# Patient Record
Sex: FEMALE | Race: WHITE | NOT HISPANIC OR LATINO | Employment: UNEMPLOYED | URBAN - METROPOLITAN AREA
[De-identification: names, ages, dates, MRNs, and addresses within clinical notes are randomized per-mention and may not be internally consistent; named-entity substitution may affect disease eponyms.]

---

## 2017-01-27 ENCOUNTER — HOSPITAL ENCOUNTER (EMERGENCY)
Facility: HOSPITAL | Age: 42
Discharge: PSYCHIATRIC OR SUBSTANCE USE FACILITY | End: 2017-01-28
Attending: EMERGENCY MEDICINE
Payer: COMMERCIAL

## 2017-01-27 DIAGNOSIS — R82.81 PYURIA: ICD-10-CM

## 2017-01-27 DIAGNOSIS — F31.81: Primary | ICD-10-CM

## 2017-01-27 PROCEDURE — 81025 URINE PREGNANCY TEST: CPT | Performed by: EMERGENCY MEDICINE

## 2017-01-28 VITALS
OXYGEN SATURATION: 100 % | HEART RATE: 89 BPM | TEMPERATURE: 98 F | DIASTOLIC BLOOD PRESSURE: 85 MMHG | SYSTOLIC BLOOD PRESSURE: 113 MMHG | BODY MASS INDEX: 39.32 KG/M2 | RESPIRATION RATE: 18 BRPM | WEIGHT: 215 LBS

## 2017-01-28 LAB
ALBUMIN SERPL BCP-MCNC: 4 G/DL
ALP SERPL-CCNC: 76 U/L
ALT SERPL W/O P-5'-P-CCNC: 12 U/L
AMPHET+METHAMPHET UR QL: NEGATIVE
ANION GAP SERPL CALC-SCNC: 9 MMOL/L
APAP SERPL-MCNC: <3 UG/ML
AST SERPL-CCNC: 12 U/L
B-HCG UR QL: NEGATIVE
BACTERIA #/AREA URNS AUTO: ABNORMAL /HPF
BARBITURATES UR QL SCN>200 NG/ML: NEGATIVE
BASOPHILS # BLD AUTO: 0.01 K/UL
BASOPHILS NFR BLD: 0.2 %
BENZODIAZ UR QL SCN>200 NG/ML: NEGATIVE
BILIRUB SERPL-MCNC: 0.7 MG/DL
BILIRUB UR QL STRIP: NEGATIVE
BUN SERPL-MCNC: 16 MG/DL
BZE UR QL SCN: NEGATIVE
CALCIUM SERPL-MCNC: 8.9 MG/DL
CANNABINOIDS UR QL SCN: NEGATIVE
CHLORIDE SERPL-SCNC: 105 MMOL/L
CLARITY UR REFRACT.AUTO: ABNORMAL
CO2 SERPL-SCNC: 26 MMOL/L
COLOR UR AUTO: YELLOW
CREAT SERPL-MCNC: 0.8 MG/DL
CREAT UR-MCNC: 257 MG/DL
CTP QC/QA: YES
DIFFERENTIAL METHOD: ABNORMAL
EOSINOPHIL # BLD AUTO: 0.1 K/UL
EOSINOPHIL NFR BLD: 1.4 %
ERYTHROCYTE [DISTWIDTH] IN BLOOD BY AUTOMATED COUNT: 13.7 %
EST. GFR  (AFRICAN AMERICAN): >60 ML/MIN/1.73 M^2
EST. GFR  (NON AFRICAN AMERICAN): >60 ML/MIN/1.73 M^2
ETHANOL SERPL-MCNC: <10 MG/DL
GLUCOSE SERPL-MCNC: 93 MG/DL
GLUCOSE UR QL STRIP: NEGATIVE
HCT VFR BLD AUTO: 40.7 %
HGB BLD-MCNC: 12.9 G/DL
HGB UR QL STRIP: ABNORMAL
HYALINE CASTS UR QL AUTO: 11 /LPF
KETONES UR QL STRIP: ABNORMAL
LEUKOCYTE ESTERASE UR QL STRIP: ABNORMAL
LYMPHOCYTES # BLD AUTO: 1.6 K/UL
LYMPHOCYTES NFR BLD: 32 %
MCH RBC QN AUTO: 27.5 PG
MCHC RBC AUTO-ENTMCNC: 31.7 %
MCV RBC AUTO: 87 FL
METHADONE UR QL SCN>300 NG/ML: NEGATIVE
MICROSCOPIC COMMENT: ABNORMAL
MONOCYTES # BLD AUTO: 0.5 K/UL
MONOCYTES NFR BLD: 8.9 %
NEUTROPHILS # BLD AUTO: 2.9 K/UL
NEUTROPHILS NFR BLD: 57.1 %
NITRITE UR QL STRIP: NEGATIVE
OPIATES UR QL SCN: NEGATIVE
PCP UR QL SCN>25 NG/ML: NEGATIVE
PH UR STRIP: 6 [PH] (ref 5–8)
PLATELET # BLD AUTO: 342 K/UL
PMV BLD AUTO: 9.5 FL
POTASSIUM SERPL-SCNC: 4 MMOL/L
PROT SERPL-MCNC: 7.3 G/DL
PROT UR QL STRIP: ABNORMAL
RBC # BLD AUTO: 4.69 M/UL
RBC #/AREA URNS AUTO: 7 /HPF (ref 0–4)
SODIUM SERPL-SCNC: 140 MMOL/L
SP GR UR STRIP: 1.03 (ref 1–1.03)
SQUAMOUS #/AREA URNS AUTO: 21 /HPF
TOXICOLOGY INFORMATION: NORMAL
TSH SERPL DL<=0.005 MIU/L-ACNC: 1.25 UIU/ML
URN SPEC COLLECT METH UR: ABNORMAL
UROBILINOGEN UR STRIP-ACNC: 2 EU/DL
WBC # BLD AUTO: 5.06 K/UL
WBC #/AREA URNS AUTO: 18 /HPF (ref 0–5)

## 2017-01-28 PROCEDURE — 87086 URINE CULTURE/COLONY COUNT: CPT

## 2017-01-28 PROCEDURE — 99285 EMERGENCY DEPT VISIT HI MDM: CPT | Mod: ,,, | Performed by: EMERGENCY MEDICINE

## 2017-01-28 PROCEDURE — 80053 COMPREHEN METABOLIC PANEL: CPT

## 2017-01-28 PROCEDURE — 80329 ANALGESICS NON-OPIOID 1 OR 2: CPT

## 2017-01-28 PROCEDURE — 84443 ASSAY THYROID STIM HORMONE: CPT

## 2017-01-28 PROCEDURE — 85025 COMPLETE CBC W/AUTO DIFF WBC: CPT

## 2017-01-28 PROCEDURE — 80320 DRUG SCREEN QUANTALCOHOLS: CPT

## 2017-01-28 PROCEDURE — 99285 EMERGENCY DEPT VISIT HI MDM: CPT | Mod: 25

## 2017-01-28 PROCEDURE — 82570 ASSAY OF URINE CREATININE: CPT

## 2017-01-28 PROCEDURE — 81001 URINALYSIS AUTO W/SCOPE: CPT

## 2017-01-28 RX ORDER — HALOPERIDOL 5 MG/ML
5 INJECTION INTRAMUSCULAR EVERY 4 HOURS PRN
Status: DISCONTINUED | OUTPATIENT
Start: 2017-01-28 | End: 2017-01-28

## 2017-01-28 RX ORDER — ZOLPIDEM TARTRATE 5 MG/1
10 TABLET ORAL NIGHTLY PRN
Status: DISCONTINUED | OUTPATIENT
Start: 2017-01-28 | End: 2017-01-28 | Stop reason: HOSPADM

## 2017-01-28 RX ORDER — ZIPRASIDONE HYDROCHLORIDE 20 MG/1
20 CAPSULE ORAL 2 TIMES DAILY
Status: DISCONTINUED | OUTPATIENT
Start: 2017-01-28 | End: 2017-01-28 | Stop reason: HOSPADM

## 2017-01-28 RX ORDER — ACETAMINOPHEN 325 MG/1
650 TABLET ORAL EVERY 6 HOURS PRN
Status: DISCONTINUED | OUTPATIENT
Start: 2017-01-28 | End: 2017-01-28 | Stop reason: HOSPADM

## 2017-01-28 RX ORDER — OLANZAPINE 10 MG/1
10 TABLET, ORALLY DISINTEGRATING ORAL EVERY 8 HOURS PRN
Status: DISCONTINUED | OUTPATIENT
Start: 2017-01-28 | End: 2017-01-28 | Stop reason: HOSPADM

## 2017-01-28 RX ORDER — MAG HYDROX/ALUMINUM HYD/SIMETH 200-200-20
30 SUSPENSION, ORAL (FINAL DOSE FORM) ORAL EVERY 6 HOURS PRN
Status: DISCONTINUED | OUTPATIENT
Start: 2017-01-28 | End: 2017-01-28 | Stop reason: HOSPADM

## 2017-01-28 RX ORDER — LORAZEPAM 2 MG/ML
2 INJECTION INTRAMUSCULAR EVERY 4 HOURS PRN
Status: DISCONTINUED | OUTPATIENT
Start: 2017-01-28 | End: 2017-01-28

## 2017-01-28 RX ORDER — SULFAMETHOXAZOLE AND TRIMETHOPRIM 800; 160 MG/1; MG/1
1 TABLET ORAL 2 TIMES DAILY
Status: DISCONTINUED | OUTPATIENT
Start: 2017-01-28 | End: 2017-01-28 | Stop reason: HOSPADM

## 2017-01-28 RX ORDER — DIPHENHYDRAMINE HYDROCHLORIDE 50 MG/ML
50 INJECTION INTRAMUSCULAR; INTRAVENOUS EVERY 4 HOURS PRN
Status: DISCONTINUED | OUTPATIENT
Start: 2017-01-28 | End: 2017-01-28

## 2017-01-28 NOTE — ED TRIAGE NOTES
"Reports she does not know why she is here, that her  brings her to the hospital all the time because "he says I'm crazy". Denies SI/HI, hallucinations, delusions, psychiatric diagnoses. Pt does say she has a 6 month old baby and had some post partum depression, says she does not currently take any medication and is no longer depressed. Pt is suspicious of staff, defiant with requests without extensive explanation. No other symptoms reported or noted.   "
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
## 2017-01-28 NOTE — CONSULTS
See consult note written by me yesterday at 11:00 pm.    Toni Qiu MD  [employer] PGY2  1/28/2017  Pager: [phone]

## 2017-01-28 NOTE — ED NOTES
Patient has arrived to unit with RN and Two Security Guards. Pt is calm and cooperative. VSS. Patient was given phone to talk to family. Will continue to monitor

## 2017-01-28 NOTE — ED NOTES
Requested transport of patient via [facility] to transport pt to [facility] to [facility]. Awaiting Patient to be transported from ED to [facility]
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
## 2017-01-28 NOTE — ED NOTES
PEC packet faxed to [facility], [facility], [facility], [place], [facility].

## 2017-01-28 NOTE — ED NOTES
Patient accepted to [facility] ([place])  Accepting physician Dr. Kennedy  May call report to [phone]

## 2017-01-28 NOTE — ED NOTES
Appearance: Pt awake, alert & oriented to person, place & time. Pt in no acute distress at present time.   Skin: Skin warm, dry & intact. Mucous membranes moist. Skin turgor normal.   Respiratory: Respirations even, non-labored.   Neurologic: Pt moving all extremities without difficulty. Sensation intact.   Peripheral Vascular: All peripheral pulses present.

## 2017-01-28 NOTE — ED NOTES
Secure Patient Delivery has arrived. Patient Documentation, PEC, and Patient Belongings given. Patient called family members to contact about transfer to [facility]. Patient is calm and cooperative. Two Security Guards are present and RN to transport pt to vehicle.

## 2017-01-29 LAB
BACTERIA UR CULT: NORMAL
BACTERIA UR CULT: NORMAL

## 2017-02-06 ENCOUNTER — OFFICE VISIT (OUTPATIENT)
Dept: PSYCHIATRY | Facility: CLINIC | Age: 42
End: 2017-02-06
Payer: COMMERCIAL

## 2017-02-06 DIAGNOSIS — F31.9 BIPOLAR I DISORDER, MOST RECENT EPISODE (OR CURRENT) UNSPECIFIED: Primary | ICD-10-CM

## 2017-02-06 PROCEDURE — 90791 PSYCH DIAGNOSTIC EVALUATION: CPT | Mod: S$GLB,,, | Performed by: SOCIAL WORKER
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
## 2017-02-12 PROBLEM — F31.9 BIPOLAR I DISORDER, MOST RECENT EPISODE (OR CURRENT) UNSPECIFIED: Status: ACTIVE | Noted: 2017-02-12

## 2017-03-23 ENCOUNTER — OFFICE VISIT (OUTPATIENT)
Dept: PSYCHIATRY | Facility: CLINIC | Age: 42
End: 2017-03-23
Payer: COMMERCIAL

## 2017-03-23 VITALS
BODY MASS INDEX: 38.04 KG/M2 | WEIGHT: 228.31 LBS | HEART RATE: 97 BPM | HEIGHT: 65 IN | SYSTOLIC BLOOD PRESSURE: 122 MMHG | DIASTOLIC BLOOD PRESSURE: 70 MMHG

## 2017-03-23 DIAGNOSIS — F31.73 BIPOLAR I DISORDER, CURRENT OR MOST RECENT EPISODE MANIC, IN PARTIAL REMISSION: Primary | ICD-10-CM

## 2017-03-23 PROCEDURE — 1160F RVW MEDS BY RX/DR IN RCRD: CPT | Mod: S$GLB,,, | Performed by: PSYCHIATRY & NEUROLOGY

## 2017-03-23 PROCEDURE — 99999 PR PBB SHADOW E&M-EST. PATIENT-LVL III: CPT | Mod: PBBFAC,,, | Performed by: PSYCHIATRY & NEUROLOGY

## 2017-03-23 PROCEDURE — 99214 OFFICE O/P EST MOD 30 MIN: CPT | Mod: S$GLB,,, | Performed by: PSYCHIATRY & NEUROLOGY

## 2017-03-23 RX ORDER — IBUPROFEN 200 MG
200 TABLET ORAL EVERY 6 HOURS PRN
COMMUNITY
End: 2019-09-06 | Stop reason: ALTCHOICE

## 2017-03-23 RX ORDER — LAMOTRIGINE 25(42)-100
KIT ORAL
Qty: 1 EACH | Refills: 0 | Status: SHIPPED | OUTPATIENT
Start: 2017-03-23 | End: 2017-03-23

## 2017-03-23 RX ORDER — LITHIUM CARBONATE 300 MG
300 TABLET ORAL EVERY MORNING
Refills: 0 | COMMUNITY
Start: 2017-02-04 | End: 2017-03-23

## 2017-03-23 RX ORDER — TRAZODONE HYDROCHLORIDE 100 MG/1
100 TABLET ORAL NIGHTLY
Refills: 0 | COMMUNITY
Start: 2017-02-04 | End: 2017-03-23

## 2017-03-23 RX ORDER — LAMOTRIGINE 25 MG/1
TABLET ORAL
Qty: 42 TABLET | Refills: 0 | Status: SHIPPED | OUTPATIENT
Start: 2017-03-23 | End: 2017-04-10 | Stop reason: SDUPTHER

## 2017-03-23 RX ORDER — TRAZODONE HYDROCHLORIDE 50 MG/1
50-100 TABLET ORAL NIGHTLY
Qty: 60 TABLET | Refills: 1 | Status: SHIPPED | OUTPATIENT
Start: 2017-03-23 | End: 2017-04-10 | Stop reason: SDUPTHER

## 2017-03-23 RX ORDER — OLANZAPINE 10 MG/1
10 TABLET ORAL NIGHTLY
Qty: 30 TABLET | Refills: 0 | Status: SHIPPED | OUTPATIENT
Start: 2017-03-23 | End: 2017-07-19

## 2017-03-23 RX ORDER — CETIRIZINE HYDROCHLORIDE 10 MG/1
10 TABLET ORAL DAILY
COMMUNITY
End: 2019-11-07
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
## 2017-03-23 NOTE — MR AVS SNAPSHOT
[place] - Psychiatry [address] [facility] [address], [address]  [place] [address]  Phone: [phone]  Fax: [phone]                  Zonia Skaggs   3/23/2017 10:30 AM   Office Visit    Description:  Female : 1975   Provider:  Horace Whitehead MD   Department:  [facility]           Reason for Visit     New Patient                To Do List           Goals (5 Years of Data)     None      Follow-Up and Disposition     Return in about 6 weeks (around 2017), or if symptoms worsen or fail to improve.       These Medications        Disp Refills Start End    lamotrigine 25 mg (42) -100 mg (7) DsPk 1 each 0 3/23/2017     Take according to the dose pack instructions to taper upwards.    Pharmacy: [facility]/pharmacy #[id number] - [place] - [address] Ph #: [phone]       olanzapine (ZYPREXA) 10 MG tablet 30 tablet 0 3/23/2017     Take 1 tablet (10 mg total) by mouth every evening. - Oral    Pharmacy: [facility]/pharmacy #[id number] - [place] - [address] Ph #: [phone]       trazodone (DESYREL) 50 MG tablet 60 tablet 1 3/23/2017     Take 1-2 tablets ( mg total) by mouth every evening. - Oral    Pharmacy: [facility]/pharmacy #[id number] - [place] - [address] Ph #: [phone]         [facility]     [facility] -  Assistance  Registered nurses in the [facility] provide clinical advisement, health education, appointment booking, and other advisory services.  Call for this free service at [phone].             Medications           Message regarding Medications     Verify the changes and/or additions to your medication regime listed below are the same as discussed with your clinician today.  If any of these changes or additions are incorrect, please notify your healthcare provider.        START taking these NEW medications        Refills    lamotrigine 25 mg (42) -100 mg (7) DsPk 0    Sig: Take according to the dose pack instructions to taper  "upwards.    Class: Normal    olanzapine (ZYPREXA) 10 MG tablet 0    Sig: Take 1 tablet (10 mg total) by mouth every evening.    Class: Normal    Route: Oral    trazodone (DESYREL) 50 MG tablet 1    Sig: Take 1-2 tablets ( mg total) by mouth every evening.    Class: Normal    Route: Oral      STOP taking these medications     divalproex (DEPAKOTE) 500 MG Tb24 Take 2 tablets (1,000 mg total) by mouth every evening.    ziprasidone (GEODON) 80 MG capsule Take 1 capsule (80 mg total) by mouth before dinner.           Verify that the below list of medications is an accurate representation of the medications you are currently taking.  If none reported, the list may be blank. If incorrect, please contact your healthcare provider. Carry this list with you in case of emergency.           Current Medications     cetirizine (ZYRTEC) 10 MG tablet Take 10 mg by mouth once daily.    ibuprofen (ADVIL,MOTRIN) 200 MG tablet Take 200 mg by mouth every 6 (six) hours as needed for Pain.    lithium (LITHOTAB) 300 mg tablet Take 300 mg by mouth every morning.    omeprazole (PRILOSEC OTC) 20 MG tablet Take 1 tablet by mouth Daily.    lamotrigine 25 mg (42) -100 mg (7) DsPk Take according to the dose pack instructions to taper upwards.    olanzapine (ZYPREXA) 10 MG tablet Take 1 tablet (10 mg total) by mouth every evening.    trazodone (DESYREL) 50 MG tablet Take 1-2 tablets ( mg total) by mouth every evening.           Clinical Reference Information           Your Vitals Were     BP Pulse Height Weight Last Period BMI    122/70 (BP Location: Right arm, Patient Position: Sitting, BP Method: Manual) 97 5' 5" (1.651 m) 103.5 kg (228 lb 4.6 oz) 01/31/2017 (Approximate) 37.99 kg/m2      Blood Pressure          Most Recent Value    BP  122/70      Allergies as of 3/23/2017     Penicillins      Immunizations Administered on Date of Encounter - 3/23/2017     None      Instructions              [facility] - DEPARTMENT OF " PSYCHIATRY   NEW PATIENT ORIENTATION INFORMATION  OUTPATIENT SERVICES COUNSELING CONTRACT    We appreciate the opportunity to participate in your medical care and hope the following protocols will make it easier for you to receive quality treatment in our department.    1. PUNCTUALITY: Your appointment is scheduled for a fixed amount of time reserved especially for you.  To get the benefit of your appointment, please arrive early enough to allow time for parking and registration.  If you are late for your appointment, your clinician is not able to offer additional time.  Please make every effort to be on time.      2. PAYMENT FOR SERVICES:   Payments are expected at the time of service.  Please contact Patrica Astudillo at [phone] if you need to resolve issues involving your account at [facility] or to set up a payment plan.    3. CANCELLATION / MISSED APPOINTMENTS:   In order to receive quality care, all appointments must be kept.  Appointment may be cancelled, ONLY by talking with an  at phone number [phone], between 8:00 a.m. and 5:00 p.m., Monday through Friday, at least 24 hours before your appointment time.  Your clinician reserves this time specifically for you, and if you will be unable to use it, it is necessary that you cancel in a timely manner.  If you do not give at least 24-hour notice of cancellation a full fee will be assessed.  Please note that insurance does not cover no-show charges, so you will be billed directly.  If you are consistently late, cancel, or do not show for your appointments, our department reserves the right to terminate treatment    4. CALLING THE DEPARTMENT:   MESSAGES, SCHEDULE OR CANCEL APPOINTMENTS- In general you can reach the department by calling [phone], between 8:00 a.m. and 5:00 p.m., Monday through Friday, to schedule or cancel appointments or leave a message for your clinician.  It is advisable to schedule your visits far in advance to obtain  the most convenient times for your appointments.   AFTER HOURS, WEEKEND OR HOLIDAYS- For urgent questions after hours, weekends and holidays, calling the department number [phone] will connect you to the nursing staff on the Psychiatry Inpatient Unit.  The nursing staff will speak with you and contact the On Call psychiatrist if necessary.   EMERGENCY-  In case of a crisis when there is a concern of harm to self or others, call 911 or the office [phone] between 8:00 a.m. and 5:00 p.m., Monday through Friday.  After hours, weekends or holidays, please call 911 or go to the Emergency Department where you can be thoroughly evaluated by the On Call psychiatrist.  If possible, contact the psychiatrist on call at [phone] prior to leaving for the [facility].    5. TEAM APPROACH:  Most patients receive therapy through our team system.  In the team system, your primary therapist will be a , psychologist, psychiatry resident or psychiatrist.  If your therapist is a , psychologist or psychiatry resident, please contact your primary therapist first in matters other than medications or acute medical problems.    6. PRESCRIPTION REFILLS:  Prescription refills must be done at your physician office visit.  You will be given a sufficient number of refills to last one extra month beyond your next appointment.  No additional refills will be approved beyond the original treatment plan.  After hours, sufficient medication may be approved to last until the next scheduled appointment. After hours requests for refills on controlled substances may be declined by the psychiatrist on call, as he or she may not be familiar with your case  Please work closely with your doctor so that you have sufficient medication until your next appointment.  Again, please note that no additional prescriptions will be approved per patient request over the phone.   No additional refills will be  approved beyond the original treatment plan.   In certain exceptional situations, a phone consult appointment may be arranged, with appropriate charge, for the review and approval of prescription refills to last until the next scheduled appointment.    7. FOLLOW UP APPOINTMENTS:  Follow-up appointments can be made in person at the Psychiatry Appointment Desk, [place] [address], or by calling [phone], from 8am to 5pm, between Monday and Friday.  It is advisable to schedule your office visits far enough in advance to obtain the most convenient times for your appointments.    8. PAYMENT FOR SERVICES:   Payments are expected at the time of service.  Please contact Patrica Astudillo at [phone] if you need to resolve issues involving your account at [facility] or to set up a payment plan.    Revised Feb. 23, 2010 - F/OA1/Newpatient                            You have been provided with a certain amount of medication with a specified number of refills.  Please follow up within an adequate time before you run out of medications.  If you run out of medication before your next appointment you may be charged a refill fee.  REFILLS FOR CONTROLLED SUBSTANCES WILL NOT BE GIVEN WITHOUT AN APPOINTMENT.  I will not honor or fill automated refill requests from pharmacies.    Please book your next appointment today by phone: [phone].  Call 8am and 10:30am to speak with my assistant.    PLEASE BE AT LEAST 15 MINUTES EARLY FOR YOUR NEXT APPOINTMENT.  PLEASE, DO NOT BE LATE OR YOU WILL BE TURNED AWAY AND ASKED TO RESCHEDULE.  YOU MUST ALLOW TIME FOR CHECK-IN AS WELL AS GET YOUR VITAL SIGNS AND GO OVER YOUR MEDICATIONS.  Tardiness can affect and is not fair to the patients who present after you and are on time for their appointments.  It causes a delay in the appointments for patients and staff.  IF YOU ARE LATE FOR YOUR APPOINTMENT TIME, YOU WILL BE SEEN FOR A SHORTENED AMOUNT OF TIME OR ASKED TO RESCHEDULE.  THERE IS A  "POSSIBILITY THAT YOU WILL BE CHARGED FOR THE APPOINTMENT TIME PERSONALLY AND IT WILL NOT GO TO YOUR INSURANCE.  YOU MAY ALSO BE DISCHARGED FROM CLINIC with multiple "No Show" appointments.     -----------------------------------------------------------------------------------------------------------------  IF YOU FEEL SUICIDAL OR HAVING THOUGHTS OR PLANS TO HURT YOURSELF OR OTHERS, CALL 911 OR REPORT TO THE NEAREST EMERGENCY ROOM.  YOU CAN ALSO ACCESS ONE OF THE FOLLOWING HOTLINES:    [facility] Mobile Crisis  [phone]    [facility] Crisis Line   [phone]     [facility]  24 hours / 7 days   [phone]            Language Assistance Services     ATTENTION: Language assistance services are available, free of charge. Please call [phone].      ATENCIÓN: Si dorothyla spring, tiene a ford disposición servicios gratuitos de asistencia lingüística. Llame al [phone].     RONDA Ý: N?u b?n nói Ti?ng Vi?t, có các d?ch v? h? tr? ngôn ng? mi?n phí dành cho b?n. G?i s? [phone].         [facility] - Psychiatry complies with applicable Federal civil rights laws and does not discriminate on the basis of race, color, national origin, age, disability, or sex.        "

## 2017-03-23 NOTE — PATIENT INSTRUCTIONS
[facility] - DEPARTMENT OF PSYCHIATRY   NEW PATIENT ORIENTATION INFORMATION  OUTPATIENT SERVICES COUNSELING CONTRACT    We appreciate the opportunity to participate in your medical care and hope the following protocols will make it easier for you to receive quality treatment in our department.    1. PUNCTUALITY: Your appointment is scheduled for a fixed amount of time reserved especially for you.  To get the benefit of your appointment, please arrive early enough to allow time for parking and registration.  If you are late for your appointment, your clinician is not able to offer additional time.  Please make every effort to be on time.      2. PAYMENT FOR SERVICES:   Payments are expected at the time of service.  Please contact Patrica Astudillo at [phone] if you need to resolve issues involving your account at [facility] or to set up a payment plan.    3. CANCELLATION / MISSED APPOINTMENTS:   In order to receive quality care, all appointments must be kept.  Appointment may be cancelled, ONLY by talking with an  at phone number [phone], between 8:00 a.m. and 5:00 p.m., Monday through Friday, at least 24 hours before your appointment time.  Your clinician reserves this time specifically for you, and if you will be unable to use it, it is necessary that you cancel in a timely manner.  If you do not give at least 24-hour notice of cancellation a full fee will be assessed.  Please note that insurance does not cover no-show charges, so you will be billed directly.  If you are consistently late, cancel, or do not show for your appointments, our department reserves the right to terminate treatment    4. CALLING THE DEPARTMENT:   MESSAGES, SCHEDULE OR CANCEL APPOINTMENTS- In general you can reach the department by calling [phone], between 8:00 a.m. and 5:00 p.m., Monday through Friday, to schedule or cancel appointments or leave a message for your clinician.  It is advisable  to schedule your visits far in advance to obtain the most convenient times for your appointments.   AFTER HOURS, WEEKEND OR HOLIDAYS- For urgent questions after hours, weekends and holidays, calling the department number [phone] will connect you to the nursing staff on the Psychiatry Inpatient Unit.  The nursing staff will speak with you and contact the On Call psychiatrist if necessary.   EMERGENCY-  In case of a crisis when there is a concern of harm to self or others, call 911 or the office [phone] between 8:00 a.m. and 5:00 p.m., Monday through Friday.  After hours, weekends or holidays, please call 911 or go to the Emergency Department where you can be thoroughly evaluated by the On Call psychiatrist.  If possible, contact the psychiatrist on call at [phone] prior to leaving for the [facility].    5. TEAM APPROACH:  Most patients receive therapy through our team system.  In the team system, your primary therapist will be a , psychologist, psychiatry resident or psychiatrist.  If your therapist is a , psychologist or psychiatry resident, please contact your primary therapist first in matters other than medications or acute medical problems.    6. PRESCRIPTION REFILLS:  Prescription refills must be done at your physician office visit.  You will be given a sufficient number of refills to last one extra month beyond your next appointment.  No additional refills will be approved beyond the original treatment plan.  After hours, sufficient medication may be approved to last until the next scheduled appointment. After hours requests for refills on controlled substances may be declined by the psychiatrist on call, as he or she may not be familiar with your case  Please work closely with your doctor so that you have sufficient medication until your next appointment.  Again, please note that no additional prescriptions will be approved per patient  request over the phone.   No additional refills will be approved beyond the original treatment plan.   In certain exceptional situations, a phone consult appointment may be arranged, with appropriate charge, for the review and approval of prescription refills to last until the next scheduled appointment.    7. FOLLOW UP APPOINTMENTS:  Follow-up appointments can be made in person at the Psychiatry Appointment Desk, [place] [address], or by calling [phone], from 8am to 5pm, between Monday and Friday.  It is advisable to schedule your office visits far enough in advance to obtain the most convenient times for your appointments.    8. PAYMENT FOR SERVICES:   Payments are expected at the time of service.  Please contact Patrica Astudillo at [phone] if you need to resolve issues involving your account at [facility] or to set up a payment plan.    Revised Feb. 23, 2010 - F/OA1/Newpatient                            You have been provided with a certain amount of medication with a specified number of refills.  Please follow up within an adequate time before you run out of medications.  If you run out of medication before your next appointment you may be charged a refill fee.  REFILLS FOR CONTROLLED SUBSTANCES WILL NOT BE GIVEN WITHOUT AN APPOINTMENT.  I will not honor or fill automated refill requests from pharmacies.    Please book your next appointment today by phone: [phone].  Call 8am and 10:30am to speak with my assistant.    PLEASE BE AT LEAST 15 MINUTES EARLY FOR YOUR NEXT APPOINTMENT.  PLEASE, DO NOT BE LATE OR YOU WILL BE TURNED AWAY AND ASKED TO RESCHEDULE.  YOU MUST ALLOW TIME FOR CHECK-IN AS WELL AS GET YOUR VITAL SIGNS AND GO OVER YOUR MEDICATIONS.  Tardiness can affect and is not fair to the patients who present after you and are on time for their appointments.  It causes a delay in the appointments for patients and staff.  IF YOU ARE LATE FOR YOUR APPOINTMENT TIME, YOU WILL BE SEEN FOR A SHORTENED  "AMOUNT OF TIME OR ASKED TO RESCHEDULE.  THERE IS A POSSIBILITY THAT YOU WILL BE CHARGED FOR THE APPOINTMENT TIME PERSONALLY AND IT WILL NOT GO TO YOUR INSURANCE.  YOU MAY ALSO BE DISCHARGED FROM CLINIC with multiple "No Show" appointments.     -----------------------------------------------------------------------------------------------------------------  IF YOU FEEL SUICIDAL OR HAVING THOUGHTS OR PLANS TO HURT YOURSELF OR OTHERS, CALL 911 OR REPORT TO THE NEAREST EMERGENCY ROOM.  YOU CAN ALSO ACCESS ONE OF THE FOLLOWING HOTLINES:    [facility]  [phone]    [facility] Crisis Line   [phone]     [place]  24 hours / 7 days   [phone]       "

## 2017-04-10 RX ORDER — TRAZODONE HYDROCHLORIDE 50 MG/1
50-100 TABLET ORAL NIGHTLY
Qty: 60 TABLET | Refills: 0 | Status: SHIPPED | OUTPATIENT
Start: 2017-04-10 | End: 2018-01-31

## 2017-04-10 RX ORDER — LAMOTRIGINE 100 MG/1
100 TABLET ORAL DAILY
Qty: 30 TABLET | Refills: 0 | Status: SHIPPED | OUTPATIENT
Start: 2017-04-10 | End: 2017-04-26 | Stop reason: SDUPTHER

## 2017-04-26 ENCOUNTER — OFFICE VISIT (OUTPATIENT)
Dept: PSYCHIATRY | Facility: CLINIC | Age: 42
End: 2017-04-26
Payer: COMMERCIAL

## 2017-04-26 VITALS
BODY MASS INDEX: 36.93 KG/M2 | WEIGHT: 229.81 LBS | HEIGHT: 66 IN | HEART RATE: 93 BPM | SYSTOLIC BLOOD PRESSURE: 122 MMHG | DIASTOLIC BLOOD PRESSURE: 70 MMHG

## 2017-04-26 DIAGNOSIS — F31.73 BIPOLAR I DISORDER, CURRENT OR MOST RECENT EPISODE MANIC, IN PARTIAL REMISSION: Primary | ICD-10-CM

## 2017-04-26 PROCEDURE — 99999 PR PBB SHADOW E&M-EST. PATIENT-LVL III: CPT | Mod: PBBFAC,,, | Performed by: PSYCHIATRY & NEUROLOGY

## 2017-04-26 PROCEDURE — 1160F RVW MEDS BY RX/DR IN RCRD: CPT | Mod: S$GLB,,, | Performed by: PSYCHIATRY & NEUROLOGY

## 2017-04-26 PROCEDURE — 99214 OFFICE O/P EST MOD 30 MIN: CPT | Mod: S$GLB,,, | Performed by: PSYCHIATRY & NEUROLOGY

## 2017-04-26 RX ORDER — BUPROPION HYDROCHLORIDE 100 MG/1
100 TABLET, EXTENDED RELEASE ORAL 2 TIMES DAILY
Qty: 60 TABLET | Refills: 1 | Status: SHIPPED | OUTPATIENT
Start: 2017-04-26 | End: 2017-07-19 | Stop reason: SINTOL

## 2017-04-26 RX ORDER — LAMOTRIGINE 200 MG/1
200 TABLET ORAL DAILY
Qty: 30 TABLET | Refills: 1 | Status: SHIPPED | OUTPATIENT
Start: 2017-04-26 | End: 2017-07-05 | Stop reason: SDUPTHER

## 2017-04-26 NOTE — PATIENT INSTRUCTIONS
"        You have been provided with a certain amount of medication with a specified number of refills.  Please follow up within an adequate time before you run out of medications.    REFILLS FOR CONTROLLED SUBSTANCES WILL NOT BE GIVEN WITHOUT AN APPOINTMENT.  I will not honor or fill automated refill requests from pharmacies.  You must come in for an appointment to get refills.    Please book your next appointment for myself or therapist by phone: [phone].        PLEASE BE AT LEAST 15 MINUTES EARLY FOR YOUR NEXT APPOINTMENT.  PLEASE, DO NOT BE LATE OR YOU WILL BE TURNED AWAY AND ASKED TO RESCHEDULE.  YOU MUST ALLOW TIME FOR CHECK-IN AS WELL AS GET YOUR VITAL SIGNS AND GO OVER YOUR MEDICATIONS.  Tardiness can affect and is not fair to the patients who present after you and are on time for their appointments.  It causes a delay in the appointments for patients and staff.  THERE IS A POSSIBILITY THAT YOU WILL BE CHARGED FOR THE APPOINTMENT TIME PERSONALLY AND IT WILL NOT GO TO YOUR INSURANCE.  YOU MAY ALSO BE DISCHARGED FROM CLINIC with multiple "No Show" appointments.       -----------------------------------------------------------------------------------------------------------------  IF YOU FEEL SUICIDAL OR HAVING THOUGHTS OR PLANS TO HURT YOURSELF OR OTHERS, CALL 911 OR REPORT TO THE NEAREST EMERGENCY ROOM.  YOU CAN ALSO ACCESS ONE OF THE FOLLOWING HOTLINES:    NO[facility] SANTILLAN  [facility]  [phone]    [place]   [facility] Crisis Line   [phone]     [facility]  24 hours / 7 days   [phone]       "

## 2017-04-26 NOTE — MR AVS SNAPSHOT
[place] - Psychiatry [address] [facility] [address], [address]  [place] [address]  Phone: [phone]  Fax: [phone]                  Zonia Skaggs   2017 9:30 AM   Office Visit    Description:  Female : 1975   Provider:  Horace Whitehead MD   Department:  [facility]           Reason for Visit     Follow-up                To Do List           Goals (5 Years of Data)     None      Follow-Up and Disposition     Return in about 2 months (around 2017), or if symptoms worsen or fail to improve.       These Medications        Disp Refills Start End    lamotrigine (LAMICTAL) 200 MG tablet 30 tablet 1 2017     Take 1 tablet (200 mg total) by mouth once daily. - Oral    Pharmacy: [facility]/pharmacy #[id number] - [place] - [address] Ph #: [phone]       buPROPion (WELLBUTRIN SR) 100 MG TBSR 12 hr tablet 60 tablet 1 2017    Take 1 tablet (100 mg total) by mouth 2 (two) times daily. - Oral    Pharmacy: [facility]/pharmacy #[id number] - [place] - [address] Ph #: [phone]         [facility]     [facility] On [facility] Nurse Care Line -  Assistance  Unless otherwise directed by your provider, please contact [facility], our nurse care line that is available for  assistance.     Registered nurses in the [facility] On [facility] Center provide: appointment scheduling, clinical advisement, health education, and other advisory services.  Call: [phone] (toll free)               Medications           Message regarding Medications     Verify the changes and/or additions to your medication regime listed below are the same as discussed with your clinician today.  If any of these changes or additions are incorrect, please notify your healthcare provider.        START taking these NEW medications        Refills    buPROPion (WELLBUTRIN SR) 100 MG TBSR 12 hr tablet 1    Sig: Take 1 tablet (100 mg total) by mouth 2 (two) times daily.    Class: Normal    Route: Oral     "  CHANGE how you are taking these medications     Start Taking Instead of    lamotrigine (LAMICTAL) 200 MG tablet lamotrigine (LAMICTAL) 100 MG tablet    Dosage:  Take 1 tablet (200 mg total) by mouth once daily. Dosage:  Take 1 tablet (100 mg total) by mouth once daily.    Reason for Change:  Reorder            Verify that the below list of medications is an accurate representation of the medications you are currently taking.  If none reported, the list may be blank. If incorrect, please contact your healthcare provider. Carry this list with you in case of emergency.           Current Medications     cetirizine (ZYRTEC) 10 MG tablet Take 10 mg by mouth once daily.    ibuprofen (ADVIL,MOTRIN) 200 MG tablet Take 200 mg by mouth every 6 (six) hours as needed for Pain.    lamotrigine (LAMICTAL) 200 MG tablet Take 1 tablet (200 mg total) by mouth once daily.    omeprazole (PRILOSEC OTC) 20 MG tablet Take 1 tablet by mouth Daily.    trazodone (DESYREL) 50 MG tablet Take 1-2 tablets ( mg total) by mouth every evening.    buPROPion (WELLBUTRIN SR) 100 MG TBSR 12 hr tablet Take 1 tablet (100 mg total) by mouth 2 (two) times daily.    olanzapine (ZYPREXA) 10 MG tablet Take 1 tablet (10 mg total) by mouth every evening.           Clinical Reference Information           Your Vitals Were     BP Pulse Height Weight Last Period BMI    122/70 (BP Location: Right arm, Patient Position: Sitting, BP Method: Manual) 93 5' 6" (1.676 m) 104.2 kg (229 lb 13.3 oz) 04/21/2017 (Approximate) 37.1 kg/m2      Blood Pressure          Most Recent Value    BP  122/70      Allergies as of 4/26/2017     Penicillins      Immunizations Administered on Date of Encounter - 4/26/2017     None      Instructions            You have been provided with a certain amount of medication with a specified number of refills.  Please follow up within an adequate time before you run out of medications.    REFILLS FOR CONTROLLED SUBSTANCES WILL NOT BE GIVEN " "WITHOUT AN APPOINTMENT.  I will not honor or fill automated refill requests from pharmacies.  You must come in for an appointment to get refills.    Please book your next appointment for myself or therapist by phone: [phone].        PLEASE BE AT LEAST 15 MINUTES EARLY FOR YOUR NEXT APPOINTMENT.  PLEASE, DO NOT BE LATE OR YOU WILL BE TURNED AWAY AND ASKED TO RESCHEDULE.  YOU MUST ALLOW TIME FOR CHECK-IN AS WELL AS GET YOUR VITAL SIGNS AND GO OVER YOUR MEDICATIONS.  Tardiness can affect and is not fair to the patients who present after you and are on time for their appointments.  It causes a delay in the appointments for patients and staff.  THERE IS A POSSIBILITY THAT YOU WILL BE CHARGED FOR THE APPOINTMENT TIME PERSONALLY AND IT WILL NOT GO TO YOUR INSURANCE.  YOU MAY ALSO BE DISCHARGED FROM CLINIC with multiple "No Show" appointments.       -----------------------------------------------------------------------------------------------------------------  IF YOU FEEL SUICIDAL OR HAVING THOUGHTS OR PLANS TO HURT YOURSELF OR OTHERS, CALL 911 OR REPORT TO THE NEAREST EMERGENCY ROOM.  YOU CAN ALSO ACCESS ONE OF THE FOLLOWING HOTLINES:    [facility] Mobile Crisis  [phone]    [facility] Crisis Line   [phone]     [facility]  24 hours / 7 days   [phone]            Language Assistance Services     ATTENTION: Language assistance services are available, free of charge. Please call [phone].      ATENCIÓN: Si dorothyla spring, tiene a ford disposición servicios gratuitos de asistencia lingüística. Llame al [phone].     CHÚ Ý: N?u b?n nói Ti?ng Vi?t, có các d?ch v? h? tr? ngôn ng? mi?n phí dành cho b?n. G?i s? [phone].         [facility] complies with applicable Federal civil rights laws and does not discriminate on the basis of race, color, national origin, age, disability, or sex.        "

## 2017-04-26 NOTE — PROGRESS NOTES
Outpatient Psychiatry Follow-Up Visit (MD/NP)    4/26/2017    Clinical Status of Patient:  Outpatient (Ambulatory)    Chief Complaint:  Zonia Skaggs is a 41 y.o. female who presents today for follow-up of mood disorder.  Met with patient.      Interval History and Content of Current Session:  Interim Events/Subjective Report/Content of Current Session: Patient Zonia Skaggs presents to clinic for follow up after her initial psychiatric evaluation.  She has been compliant with Lamictal.  No rash but has had pimples.  She has noticed that she lacks focus and feeling that she is not able to concentrate.  No walt.  Not exercising.  She is getting tired of home schooling in that it is becoming more difficult.  Mostly asking for changes for motivation and concentration.  Not necessarily endorsing depression.  Not anxious.    Psychotherapy:  · Target symptoms: mood disorder  · Why chosen therapy is appropriate versus another modality: relevant to diagnosis  · Outcome monitoring methods: self-report, observation  · Therapeutic intervention type: supportive psychotherapy  · Topics discussed/themes: building skills sets for symptom management, symptom recognition  · The patient's response to the intervention is accepting. The patient's progress toward treatment goals is limited.   · Duration of intervention: 15 minutes.    Review of Systems   · PSYCHIATRIC: Pertinant items are noted in the narrative.  · CONSTITUTIONAL: No weight gain or loss.   · MUSCULOSKELETAL: No pain or stiffness of the joints.  · NEUROLOGIC: No weakness, sensory changes, seizures, confusion, memory loss, tremor or other abnormal movements.  · RESPIRATORY: No shortness of breath.  · CARDIOVASCULAR: No tachycardia or chest pain.  · GASTROINTESTINAL: No nausea, vomiting, pain, constipation or diarrhea.    Past Medical, Family and Social History: The patient's past medical, family and social history have been reviewed and updated as appropriate within the  "electronic medical record - see encounter notes.    Compliance: yes    Side effects: None    Risk Parameters:  Patient reports no suicidal ideation  Patient reports no homicidal ideation  Patient reports no self-injurious behavior  Patient reports no violent behavior    Exam (detailed: at least 9 elements; comprehensive: all 15 elements)   Constitutional  Vitals:  Most recent vital signs, dated less than 90 days prior to this appointment, were reviewed.   Vitals:    04/26/17 0928   BP: 122/70   Pulse: 93   Weight: 104.2 kg (229 lb 13.3 oz)   Height: 5' 6" (1.676 m)        General:  age appropriate, overweight     Musculoskeletal  Muscle Strength/Tone:  no tremor, no tic   Gait & Station:  non-ataxic     Psychiatric  Speech:  no latency; no press   Mood & Affect:  euthymic  congruent and appropriate   Thought Process:  normal and logical   Associations:  intact   Thought Content:  normal, no suicidality, no homicidality, delusions, or paranoia   Insight:  has awareness of illness   Judgement: behavior is adequate to circumstances   Orientation:  person, place, situation, time/date   Memory: intact for content of interview   Language: grossly intact   Attention Span & Concentration:  able to focus   Fund of Knowledge:  intact and appropriate to age and level of education     Assessment and Diagnosis   Status/Progress: Based on the examination today, the patient's problem(s) is/are inadequately controlled.  New problems have been presented today.   Co-morbidities are complicating management of the primary condition.  There are no active rule-out diagnoses for this patient at this time.     General Impression: We will continue pharmacological intervention and adjunctive therapy.       ICD-10-CM ICD-9-CM   1. Bipolar I disorder, current or most recent episode manic, in partial remission F31.73 296.45       Intervention/Counseling/Treatment Plan   · Medication Management: Continue current medications. The risks and " benefits of medication were discussed with the patient.  · Counseling provided with patient as follows: importance of compliance with chosen treatment options was emphasized, risks and benefits of treatment options, including medications, were discussed with the patient, risk factor reduction, prognosis, patient education, instructions for  management, treatment and follow-up were reviewed  1. Increase lamotrigine by slow titration of 150 mg daily for 2 weeks, then 200 mg daily targeting mood stabilization.  Warned of risk of Belcher-Ki syndrome.  2. Start Wellbutrin SR 100mg BID targeting mood stabilization.  Warned of risk of walt, suicidality, serotonin syndrome, seizures.  3. Hold prescription of olanzapine 10 mg by mouth to be taken only as needed should she decompensate and become manic. Warned of risk of TD, EPS, metabolic syndrome.  4. She is to call me back or send me a message in a couple of weeks to let me know how she is doing.    Return to Clinic: 2 months, as needed

## 2017-07-05 RX ORDER — LAMOTRIGINE 200 MG/1
200 TABLET ORAL DAILY
Qty: 30 TABLET | Refills: 1 | Status: SHIPPED | OUTPATIENT
Start: 2017-07-05 | End: 2017-07-10 | Stop reason: SDUPTHER

## 2017-07-10 ENCOUNTER — PATIENT MESSAGE (OUTPATIENT)
Dept: PSYCHIATRY | Facility: CLINIC | Age: 42
End: 2017-07-10

## 2017-07-10 RX ORDER — LAMOTRIGINE 200 MG/1
200 TABLET ORAL DAILY
Qty: 30 TABLET | Refills: 1 | Status: SHIPPED | OUTPATIENT
Start: 2017-07-10 | End: 2017-07-19 | Stop reason: SDUPTHER

## 2017-07-19 ENCOUNTER — OFFICE VISIT (OUTPATIENT)
Dept: PSYCHIATRY | Facility: CLINIC | Age: 42
End: 2017-07-19
Payer: COMMERCIAL

## 2017-07-19 VITALS
BODY MASS INDEX: 38.83 KG/M2 | DIASTOLIC BLOOD PRESSURE: 72 MMHG | WEIGHT: 241.63 LBS | SYSTOLIC BLOOD PRESSURE: 124 MMHG | HEIGHT: 66 IN | HEART RATE: 111 BPM

## 2017-07-19 DIAGNOSIS — F31.31 BIPOLAR 1 DISORDER, DEPRESSED, MILD: Primary | ICD-10-CM

## 2017-07-19 PROCEDURE — 99999 PR PBB SHADOW E&M-EST. PATIENT-LVL III: CPT | Mod: PBBFAC,,, | Performed by: PSYCHIATRY & NEUROLOGY

## 2017-07-19 PROCEDURE — 99214 OFFICE O/P EST MOD 30 MIN: CPT | Mod: S$GLB,,, | Performed by: PSYCHIATRY & NEUROLOGY

## 2017-07-19 RX ORDER — DULOXETIN HYDROCHLORIDE 30 MG/1
30 CAPSULE, DELAYED RELEASE ORAL DAILY
Qty: 30 CAPSULE | Refills: 1 | Status: SHIPPED | OUTPATIENT
Start: 2017-07-19 | End: 2018-01-31

## 2017-07-19 RX ORDER — LAMOTRIGINE 200 MG/1
200 TABLET ORAL DAILY
Qty: 90 TABLET | Refills: 1 | Status: SHIPPED | OUTPATIENT
Start: 2017-07-19 | End: 2018-01-31

## 2017-07-19 NOTE — PATIENT INSTRUCTIONS
"        You have been provided with a certain amount of medication with a specified number of refills.  Please follow up within an adequate time before you run out of medications.    REFILLS FOR CONTROLLED SUBSTANCES WILL NOT BE GIVEN WITHOUT AN APPOINTMENT.  I will not honor or fill automated refill requests from pharmacies.  You must come in for an appointment to get refills.    Please book your next appointment for myself or therapist by phone: [phone].        PLEASE BE AT LEAST 15 MINUTES EARLY FOR YOUR NEXT APPOINTMENT.  PLEASE, DO NOT BE LATE OR YOU WILL BE TURNED AWAY AND ASKED TO RESCHEDULE.  YOU MUST ALLOW TIME FOR CHECK-IN AS WELL AS GET YOUR VITAL SIGNS AND GO OVER YOUR MEDICATIONS.  Tardiness can affect and is not fair to the patients who present after you and are on time for their appointments.  It causes a delay in the appointments for patients and staff.  THERE IS A POSSIBILITY THAT YOU WILL BE CHARGED FOR THE APPOINTMENT TIME PERSONALLY AND IT WILL NOT GO TO YOUR INSURANCE.  YOU MAY ALSO BE DISCHARGED FROM CLINIC with multiple "No Show" appointments.       -----------------------------------------------------------------------------------------------------------------  IF YOU FEEL SUICIDAL OR HAVING THOUGHTS OR PLANS TO HURT YOURSELF OR OTHERS, CALL 911 OR REPORT TO THE NEAREST EMERGENCY ROOM.  YOU CAN ALSO ACCESS ONE OF THE FOLLOWING HOTLINES:    NOÉ SANTILLAN  [facility] Mobile [facility]  [phone]    [place]   [facility] Crisis Line   [phone]     [facility]  24 hours / 7 days   [phone])       "

## 2017-07-19 NOTE — PROGRESS NOTES
"Outpatient Psychiatry Follow-Up Visit (MD/NP)    7/19/2017    Clinical Status of Patient:  Outpatient (Ambulatory)    Chief Complaint:  Zonia Skaggs is a 41 y.o. female who presents today for follow-up of mood disorder.  Met with patient.      Interval History and Content of Current Session:  Interim Events/Subjective Report/Content of Current Session: Patient Zonia Skaggs presents to clinic for follow up.  Headaches have been somewhat worse and Wellbutrin caused her to feel dizzy and sick.  She stopped taking Wellbutrin.  She is also no longer taking olanzapine as recommended.  She does have some trouble going to sleep.  Mood has "been better" but no major mood swings.  No manic symptoms.  Does have impulsive thoughts at times but not acting on them.  She is planning on home schooling again and feels that this will be stressful.  Father is going back to work and he normally babysits for her.  She is having to look for another sitter.  Not necessarily endorsing depression but does have lack of motivation.  Not anxious.    Psychotherapy:  · Target symptoms: mood disorder  · Why chosen therapy is appropriate versus another modality: relevant to diagnosis  · Outcome monitoring methods: self-report, observation  · Therapeutic intervention type: supportive psychotherapy  · Topics discussed/themes: building skills sets for symptom management, symptom recognition  · The patient's response to the intervention is accepting. The patient's progress toward treatment goals is limited.   · Duration of intervention: 15 minutes.    Review of Systems   · PSYCHIATRIC: Pertinant items are noted in the narrative.  · CONSTITUTIONAL: No weight gain or loss.   · MUSCULOSKELETAL: No pain or stiffness of the joints.  · NEUROLOGIC: No weakness, sensory changes, seizures, confusion, memory loss, tremor or other abnormal movements.  · RESPIRATORY: No shortness of breath.  · CARDIOVASCULAR: No tachycardia or chest pain.  · GASTROINTESTINAL: " "No nausea, vomiting, pain, constipation or diarrhea.    Past Medical, Family and Social History: The patient's past medical, family and social history have been reviewed and updated as appropriate within the electronic medical record - see encounter notes.    Compliance: yes    Side effects: None    Risk Parameters:  Patient reports no suicidal ideation  Patient reports no homicidal ideation  Patient reports no self-injurious behavior  Patient reports no violent behavior    Exam (detailed: at least 9 elements; comprehensive: all 15 elements)   Constitutional  Vitals:  Most recent vital signs, dated less than 90 days prior to this appointment, were reviewed.   Vitals:    07/19/17 0926   BP: 124/72   Pulse: (!) 111   Weight: 109.6 kg (241 lb 10 oz)   Height: 5' 6" (1.676 m)        General:  age appropriate, overweight     Musculoskeletal  Muscle Strength/Tone:  no tremor, no tic   Gait & Station:  non-ataxic     Psychiatric  Speech:  no latency; no press   Mood & Affect:  dysthymic  congruent and appropriate   Thought Process:  normal and logical   Associations:  intact   Thought Content:  normal, no suicidality, no homicidality, delusions, or paranoia   Insight:  has awareness of illness   Judgement: behavior is adequate to circumstances   Orientation:  person, place, situation, time/date   Memory: intact for content of interview   Language: grossly intact   Attention Span & Concentration:  able to focus   Fund of Knowledge:  intact and appropriate to age and level of education     Assessment and Diagnosis   Status/Progress: Based on the examination today, the patient's problem(s) is/are inadequately controlled.  New problems have been presented today.   Co-morbidities are complicating management of the primary condition.  There are no active rule-out diagnoses for this patient at this time.     General Impression: We will continue pharmacological intervention and adjunctive therapy.       ICD-10-CM ICD-9-CM   1. " Bipolar 1 disorder, depressed, mild F31.31 296.51       Intervention/Counseling/Treatment Plan   · Medication Management: Continue current medications. The risks and benefits of medication were discussed with the patient.  · Counseling provided with patient as follows: importance of compliance with chosen treatment options was emphasized, risks and benefits of treatment options, including medications, were discussed with the patient, risk factor reduction, prognosis, patient education, instructions for  management, treatment and follow-up were reviewed  1.  Continue lamotrigine 200 mg daily targeting mood stabilization.  Warned of risk of Belcher-Ki syndrome.  2.  Start duloxetine 30 mg daily targeting anxiety and depression.  Warned of risk of walt, suicidality, serotonin syndrome.  3.  Hold prescription of olanzapine 10 mg by mouth to be taken only as needed should she decompensate and become manic. Warned of risk of TD, EPS, metabolic syndrome.    Return to Clinic: 2 months, as needed

## 2018-01-25 ENCOUNTER — PATIENT MESSAGE (OUTPATIENT)
Dept: PSYCHIATRY | Facility: CLINIC | Age: 43
End: 2018-01-25

## 2018-01-31 ENCOUNTER — OFFICE VISIT (OUTPATIENT)
Dept: PSYCHIATRY | Facility: CLINIC | Age: 43
End: 2018-01-31
Payer: COMMERCIAL

## 2018-01-31 VITALS
SYSTOLIC BLOOD PRESSURE: 118 MMHG | BODY MASS INDEX: 38.64 KG/M2 | HEIGHT: 66 IN | DIASTOLIC BLOOD PRESSURE: 70 MMHG | WEIGHT: 240.44 LBS | HEART RATE: 100 BPM

## 2018-01-31 DIAGNOSIS — F31.73 BIPOLAR I DISORDER, CURRENT OR MOST RECENT EPISODE MANIC, IN PARTIAL REMISSION: Primary | ICD-10-CM

## 2018-01-31 PROCEDURE — 3008F BODY MASS INDEX DOCD: CPT | Mod: S$GLB,,, | Performed by: PSYCHIATRY & NEUROLOGY

## 2018-01-31 PROCEDURE — 99999 PR PBB SHADOW E&M-EST. PATIENT-LVL III: CPT | Mod: PBBFAC,,, | Performed by: PSYCHIATRY & NEUROLOGY

## 2018-01-31 PROCEDURE — 99213 OFFICE O/P EST LOW 20 MIN: CPT | Mod: S$GLB,,, | Performed by: PSYCHIATRY & NEUROLOGY

## 2018-01-31 RX ORDER — TOPIRAMATE 50 MG/1
50 TABLET, FILM COATED ORAL 2 TIMES DAILY
Qty: 60 TABLET | Refills: 0 | Status: SHIPPED | OUTPATIENT
Start: 2018-01-31 | End: 2018-06-22 | Stop reason: ALTCHOICE

## 2018-01-31 NOTE — PROGRESS NOTES
"Outpatient Psychiatry Follow-Up Visit (MD/NP)    1/31/2018    Clinical Status of Patient:  Outpatient (Ambulatory)    Chief Complaint:  Zonia Skaggs is a 42 y.o. female who presents today for follow-up of mood disorder.  Met with patient.      Interval History and Content of Current Session:  Interim Events/Subjective Report/Content of Current Session: Patient Zonia Skaggs presents to clinic for follow up after a long hiatus.  She had sent me an email a few days ago in which she says that she had stopped all of her medications and wasn't doing well but didn't come in for an appointment due to not being able to afford the co-pay.  She comes in today for an urgent care appointment.  She has stopped lamotrigine and duloxetine and trazodone feeling that they all contributed to GI symptoms.  She has had some mood changes because of "stress in life".  Stressors include daughter who is looking at [employer] options, kids being home schooled, 9 y/o is special education.  She is sleeping well.  Also felt that lamotrigine made her feel somewhat like a "zombie" like lithium did.  No manic behaviors.  Has started doing meditation through an online osvaldo on her phone.  She is very hesitant to start any new meds and says that she would like to try for a while without medications.    Psychotherapy:  · Target symptoms: mood disorder  · Why chosen therapy is appropriate versus another modality: relevant to diagnosis  · Outcome monitoring methods: self-report, observation  · Therapeutic intervention type: supportive psychotherapy  · Topics discussed/themes: building skills sets for symptom management, symptom recognition  · The patient's response to the intervention is accepting. The patient's progress toward treatment goals is limited.   · Duration of intervention: 15 minutes.    Review of Systems   · PSYCHIATRIC: Pertinant items are noted in the narrative.  · CONSTITUTIONAL: No weight gain or loss.   · MUSCULOSKELETAL: No pain or " "stiffness of the joints.  · NEUROLOGIC: No weakness, sensory changes, seizures, confusion, memory loss, tremor or other abnormal movements.  · RESPIRATORY: No shortness of breath.  · CARDIOVASCULAR: No tachycardia or chest pain.  · GASTROINTESTINAL: No nausea, vomiting, pain, constipation or diarrhea.    Past Medical, Family and Social History: The patient's past medical, family and social history have been reviewed and updated as appropriate within the electronic medical record - see encounter notes.    Compliance: yes    Side effects: None    Risk Parameters:  Patient reports no suicidal ideation  Patient reports no homicidal ideation  Patient reports no self-injurious behavior  Patient reports no violent behavior    Exam (detailed: at least 9 elements; comprehensive: all 15 elements)   Constitutional  Vitals:  Most recent vital signs, dated less than 90 days prior to this appointment, were reviewed.   Vitals:    01/31/18 0745   BP: 118/70   Pulse: 100   Weight: 109.1 kg (240 lb 6.6 oz)   Height: 5' 6" (1.676 m)        General:  age appropriate, overweight     Musculoskeletal  Muscle Strength/Tone:  no tremor, no tic   Gait & Station:  non-ataxic     Psychiatric  Speech:  no latency; no press   Mood & Affect:  euthymic  congruent and appropriate   Thought Process:  normal and logical   Associations:  intact   Thought Content:  normal, no suicidality, no homicidality, delusions, or paranoia   Insight:  has awareness of illness   Judgement: behavior is adequate to circumstances   Orientation:  person, place, situation, time/date   Memory: intact for content of interview   Language: grossly intact   Attention Span & Concentration:  able to focus   Fund of Knowledge:  intact and appropriate to age and level of education     Assessment and Diagnosis   Status/Progress: Based on the examination today, the patient's problem(s) is/are inadequately controlled.  New problems have been presented today.   Co-morbidities are " complicating management of the primary condition.  There are no active rule-out diagnoses for this patient at this time.     General Impression: We will continue pharmacological intervention and adjunctive therapy.       ICD-10-CM ICD-9-CM   1. Bipolar I disorder, current or most recent episode manic, in partial remission F31.73 296.45       Intervention/Counseling/Treatment Plan   · Medication Management: Continue current medications. The risks and benefits of medication were discussed with the patient.  · Counseling provided with patient as follows: importance of compliance with chosen treatment options was emphasized, risks and benefits of treatment options, including medications, were discussed with the patient, risk factor reduction, prognosis, patient education, instructions for  management, treatment and follow-up were reviewed  1.  Recommend to start mood stabilizer but very hesitant.  Says that she would like to be off medications.  Educated that she is at risk of developing another manic episode.  2.  Will provide topiramate 50mg to take nightly for 2 weeks then BID targeting mood stabilization if she would like to start.  Warned of risk of word finding difficulties.  If she starts this medication, she is to come in for a follow up.  3.  Hold prescription of olanzapine 10 mg by mouth to be taken only as needed should she decompensate and become manic. Warned of risk of TD, EPS, metabolic syndrome.    Return to Clinic: 3 months, as needed

## 2018-01-31 NOTE — PATIENT INSTRUCTIONS
"        You have been provided with a certain amount of medication with a specified number of refills.  Please follow up within an adequate time before you run out of medications.    REFILLS FOR CONTROLLED SUBSTANCES WILL NOT BE GIVEN WITHOUT AN APPOINTMENT.  I will not honor or fill automated refill requests from pharmacies.  You must come in for an appointment to get refills.    Please book your next appointment for myself or therapist by phone: [phone].        PLEASE BE AT LEAST 15 MINUTES EARLY FOR YOUR NEXT APPOINTMENT.  PLEASE, DO NOT BE LATE OR YOU WILL BE TURNED AWAY AND ASKED TO RESCHEDULE.  YOU MUST ALLOW TIME FOR CHECK-IN AS WELL AS GET YOUR VITAL SIGNS AND GO OVER YOUR MEDICATIONS.  Tardiness can affect and is not fair to the patients who present after you and are on time for their appointments.  It causes a delay in the appointments for patients and staff.  THERE IS A POSSIBILITY THAT YOU WILL BE CHARGED FOR THE APPOINTMENT TIME PERSONALLY AND IT WILL NOT GO TO YOUR INSURANCE.  YOU MAY ALSO BE DISCHARGED FROM CLINIC with multiple "No Show" appointments.       -----------------------------------------------------------------------------------------------------------------  IF YOU FEEL SUICIDAL OR HAVING THOUGHTS OR PLANS TO HURT YOURSELF OR OTHERS, CALL 911 OR REPORT TO THE NEAREST EMERGENCY ROOM.  YOU CAN ALSO ACCESS ONE OF THE FOLLOWING HOTLINES:    NOÉ SANTILLAN  [facility]  [phone]    [place]   [facility]   [phone]     [facility]  24 hours / 7 days   [phone]       "

## 2018-06-22 ENCOUNTER — OFFICE VISIT (OUTPATIENT)
Dept: URGENT CARE | Facility: CLINIC | Age: 43
End: 2018-06-22
Payer: COMMERCIAL

## 2018-06-22 VITALS
WEIGHT: 240 LBS | BODY MASS INDEX: 38.57 KG/M2 | HEART RATE: 99 BPM | OXYGEN SATURATION: 97 % | RESPIRATION RATE: 18 BRPM | TEMPERATURE: 97 F | HEIGHT: 66 IN

## 2018-06-22 DIAGNOSIS — S62.339A BOXER'S FRACTURE, CLOSED, INITIAL ENCOUNTER: Primary | ICD-10-CM

## 2018-06-22 PROCEDURE — 99204 OFFICE O/P NEW MOD 45 MIN: CPT | Mod: 25,S$GLB,, | Performed by: EMERGENCY MEDICINE

## 2018-06-22 PROCEDURE — 29125 APPL SHORT ARM SPLINT STATIC: CPT | Mod: S$GLB,,, | Performed by: EMERGENCY MEDICINE

## 2018-06-22 PROCEDURE — 3008F BODY MASS INDEX DOCD: CPT | Mod: CPTII,S$GLB,, | Performed by: EMERGENCY MEDICINE

## 2018-06-22 RX ORDER — HYDROCODONE BITARTRATE AND ACETAMINOPHEN 10; 325 MG/1; MG/1
1 TABLET ORAL EVERY 8 HOURS PRN
Qty: 8 TABLET | Refills: 0 | Status: SHIPPED | OUTPATIENT
Start: 2018-06-22 | End: 2018-06-25

## 2018-06-22 NOTE — PROGRESS NOTES
"Subjective:       Patient ID: Zonia Skaggs is a 42 y.o. female.    Vitals:  height is 5' 6" (1.676 m) and weight is 108.9 kg (240 lb). Her oral temperature is 97.1 °F (36.2 °C). Her pulse is 99. Her respiration is 18 and oxygen saturation is 97%.     Chief Complaint: Hand Injury (Right)    Pt. States hit right hand at home yesterday, tender, right handed, no prior fx, is not pregnant, NOC.      Hand Injury    Her dominant hand is their right hand. The incident occurred 12 to 24 hours ago. The incident occurred at home. The injury mechanism was a direct blow. The pain is present in the right hand. The quality of the pain is described as aching. The pain is at a severity of 9/10. The pain is severe. The pain has been constant since the incident. Pertinent negatives include no chest pain or numbness. The symptoms are aggravated by movement, palpation and lifting. She has tried ice and NSAIDs for the symptoms. The treatment provided no relief.     Review of Systems   Constitution: Negative for weakness and malaise/fatigue.   HENT: Negative for nosebleeds.    Cardiovascular: Negative for chest pain and syncope.   Respiratory: Negative for shortness of breath.    Musculoskeletal: Positive for joint pain (right hand) and joint swelling. Negative for back pain and neck pain.   Gastrointestinal: Negative for abdominal pain.   Genitourinary: Negative for hematuria.   Neurological: Negative for dizziness and numbness.       Objective:      Physical Exam   Constitutional: She is oriented to person, place, and time.   Overweight   HENT:   Head: Normocephalic and atraumatic.   Neck: Normal range of motion. Neck supple.   Cardiovascular: Normal rate, regular rhythm, normal heart sounds and intact distal pulses.    Pulmonary/Chest: Breath sounds normal.   Musculoskeletal:   Right hand 4-5 MC area swollen/tender, decrease ROM to 4/5 fingers, no subungal hematomas, remainder right fingers/hand/wrist non tender.   Neurological: She " is alert and oriented to person, place, and time.   Skin: Skin is warm and dry. Capillary refill takes 2 to 3 seconds.   Psychiatric: She has a normal mood and affect. Her behavior is normal.       Assessment:       1. Boxer's fracture, closed, initial encounter        Plan:         Boxer's fracture, closed, initial encounter  -     X-Ray Hand Complete Right; Future; Expected date: 06/22/2018  -     HYDROcodone-acetaminophen (NORCO)  mg per tablet; Take 1 tablet by mouth every 8 (eight) hours as needed for Pain. 0.5-1 tablet as needed  Dispense: 8 tablet; Refill: 0  -     Ambulatory referral to Orthopedics    Right fiberglass boxer splint applied to right UE by MA and , NVI after application.  Zia Power MD  Go to the Emergency Department for any problems  Call your PCP for follow up next available.

## 2018-06-22 NOTE — PATIENT INSTRUCTIONS
Zia Power MD  Go to the Emergency Department for any problems  Call your PCP for follow up next available.    Closed Hand Fracture (Adult)  You have a fracture, or broken bone, in your hand. This may be a small crack or chip in the bone. Or it may be a major break with the broken parts pushed out of place. A closed fracture means that the broken bone has not gone through the skin. A hand fracture is treated with a splint or cast. It usually takes 4 to 6 weeks to heal. Severe injuries may require surgery.     Home care  · Keep your arm elevated to reduce pain and swelling. When sitting or lying down, elevate your arm above the level of your heart. You can do this by placing your arm on a pillow that rests on your chest or on a pillow at your side. This is most important during the first 48 hours after injury.  · Apply an ice pack over the injured area for no more than 15 to 20 minutes. Do this every 1 to 2 hours for the first 24 to 48 hours. Continue with ice packs as needed to ease pain and swelling. To make an ice pack, put ice cubes in a plastic bag that seals at the top. Wrap the bag in a clean, thin towel or cloth. Never put ice or an ice pack directly on the skin. You can place the ice pack inside the sling and directly over the cast or splint. As the ice melts, be careful that the cast or splint doesnt get wet.  · Keep the cast or splint completely dry at all times. Bathe with your cast or splint out of the water, protected with 2 large plastic bags. Place 1 bag outside the other. Tape each bag with duct tape at the top end. If a fiberglass cast or splint gets wet, dry it with a hair dryer on a cool setting.  · You may use over-the-counter pain medicine to control pain, unless another pain medicine was prescribed. If you have chronic liver or kidney disease or ever had a stomach ulcer or GI bleeding, talk with your provider beforeusing these medicines.  Follow-up care  Follow up with your healthcare  provider within 1 week, or as advised. This is to be sure the bone is healing properly. If you were given a splint, it may be changed to a cast at your follow-up visit.  If X-rays were taken, you will be told of any new findings that may affect your care.  When to seek medical advice  Call your healthcare provider right away if any of these occur:  · The plaster cast or splint becomes wet or soft  · The fiberglass cast or splint stays wet for more than 24 hours  · The cast has a bad smell  · The plaster cast or splint becomes loose  · There is increased tightness or pain under the cast or splint  · The fingers on your injured hand become swollen, cold, blue, numb, or tingly  Date Last Reviewed: 12/3/2015  © 1545-8124 [employer]. [address] [place] [address]. All rights reserved. This information is not intended as a substitute for professional medical care. Always follow your healthcare professional's instructions.        Boxers Fracture  A boxers fracture is a break in a bone in outer edge of the hand. The bone is under the pinky finger bones. Boxers fracture gets its name because it is often caused by punching a hard surface with the fist.  Understanding the bones of the hand  The bones of your hand are called metacarpal bones. They connect the bones of your fingers (phalanges) to the bones of your wrist (carpals). The fifth metacarpal is the metacarpal of the fifth finger (pinky). A metacarpal bone has a long section of the bone (shaft) connected to the end of the bone. The area where the shaft connects to the end of the bone is called the neck. The neck is the weakest point of the bone. This is where a boxers fracture happens.  What causes boxers fracture?  You may have a boxers fracture from an injury. This most often happens from punching a hard object, such as a punching bag, wall, or other firm surface. You may also get a boxers fracture if you fall on your closed  fist.  Symptoms of boxers fracture  Symptoms of a boxers fracture can include:  · Painful bruising and swelling of the hand  · Bent, claw-like pinky finger that is out of its normal position  · Limited movement (range of motion) of the ring (fourth) and pinky fingers  · Change in the shape of the knuckle  Diagnosing boxers fracture  Your healthcare provider will ask about your symptoms and about how you injured the hand. He or she will also examine your hand. You may have an X-ray of the hand. This uses a small amount of radiation to create an image of the bones.  Treatment for boxers fracture  Your healthcare provider may refer you to an orthopedist. This is a doctor who treats hand problems. Your treatment may first include:  · Cleaning any cuts  · A tetanus vaccine, if you have cuts  · Resting your hand and keeping it raised (elevated) as much as possible for a few days  · Putting ice on it throughout the day  · Taking prescription or over-the-counter pain medicine  · Wearing a splint for several weeks  You may need to have your bones put back into position. You may have a local pain medicine to keep you from feeling pain during this process. Your doctor will then move the bones back into place.  Surgery for boxers fracture  You may need surgery. This is done by an orthopedic surgeon. The surgeon makes a cut (incision) in the skin in order to put your bones back into place. You may need surgery if:  · The bone has broken through the skin  · The bone is broken in several places  · You use your hands and fingers for your work. For example, if you are a musician, craftsman, or .  · Your hand doesnt heal normally  After surgery, you may have physical therapy to help you heal. The therapy includes treatments and exercises.  What happens if you dont get treated?  An untreated boxers fracture can cause problems such as:  · You may be less able to  objects.  · You may not be able to move your hand or  finger as much as you did before the injury.  · Your finger may not look normal.  Preventing boxers fracture  If you box, make sure you use the correct technique and the proper equipment.  How to manage boxers fracture  Your healthcare provider may tell you to:  · Keep your splint from getting wet.  · Keep your bones strong and help your fracture heal. Eat foods with vitamin D, calcium, and protein.  · Stop smoking. If you smoke, your fracture may not heal as quickly or properly.  · Be careful while your hand heals. You may need to use a brace for a while.     When to call the healthcare provider  Call your healthcare provider right away if you have any of these:  · Numbness or tingling in your fingers  · Fingers that look blue  · Pain or swelling that gets worse  · Problems with your splint  · Skin in the area is that is red, painful, or swollen   Date Last Reviewed: 4/1/2017  © 4404-1315 [employer]. [address] [place] [address], [place] [address]. All rights reserved. This information is not intended as a substitute for professional medical care. Always follow your healthcare professional's instructions.

## 2019-05-21 ENCOUNTER — LAB VISIT (OUTPATIENT)
Dept: LAB | Facility: HOSPITAL | Age: 44
End: 2019-05-21
Attending: FAMILY MEDICINE
Payer: COMMERCIAL

## 2019-05-21 ENCOUNTER — OFFICE VISIT (OUTPATIENT)
Dept: FAMILY MEDICINE | Facility: CLINIC | Age: 44
End: 2019-05-21
Payer: COMMERCIAL

## 2019-05-21 VITALS
TEMPERATURE: 98 F | OXYGEN SATURATION: 99 % | WEIGHT: 243.63 LBS | BODY MASS INDEX: 40.59 KG/M2 | SYSTOLIC BLOOD PRESSURE: 126 MMHG | HEIGHT: 65 IN | DIASTOLIC BLOOD PRESSURE: 78 MMHG | RESPIRATION RATE: 18 BRPM | HEART RATE: 93 BPM

## 2019-05-21 DIAGNOSIS — R10.30 LOWER ABDOMINAL PAIN: ICD-10-CM

## 2019-05-21 DIAGNOSIS — D50.9 IRON DEFICIENCY ANEMIA, UNSPECIFIED IRON DEFICIENCY ANEMIA TYPE: ICD-10-CM

## 2019-05-21 DIAGNOSIS — R53.83 FATIGUE, UNSPECIFIED TYPE: ICD-10-CM

## 2019-05-21 DIAGNOSIS — Z23 NEED FOR TETANUS BOOSTER: ICD-10-CM

## 2019-05-21 DIAGNOSIS — Z13.220 SCREENING FOR HYPERLIPIDEMIA: ICD-10-CM

## 2019-05-21 DIAGNOSIS — F31.9 BIPOLAR 1 DISORDER: Primary | ICD-10-CM

## 2019-05-21 DIAGNOSIS — R00.2 PALPITATIONS: ICD-10-CM

## 2019-05-21 LAB
ALBUMIN SERPL BCP-MCNC: 4 G/DL (ref 3.5–5.2)
ALP SERPL-CCNC: 68 U/L (ref 55–135)
ALT SERPL W/O P-5'-P-CCNC: 18 U/L (ref 10–44)
ANION GAP SERPL CALC-SCNC: 14 MMOL/L (ref 8–16)
AST SERPL-CCNC: 20 U/L (ref 10–40)
B-HCG UR QL: NEGATIVE
BASOPHILS # BLD AUTO: 0.04 K/UL (ref 0–0.2)
BASOPHILS NFR BLD: 0.6 % (ref 0–1.9)
BILIRUB SERPL-MCNC: 0.4 MG/DL (ref 0.1–1)
BUN SERPL-MCNC: 12 MG/DL (ref 6–20)
CALCIUM SERPL-MCNC: 9.5 MG/DL (ref 8.7–10.5)
CHLORIDE SERPL-SCNC: 109 MMOL/L (ref 95–110)
CHOLEST SERPL-MCNC: 172 MG/DL (ref 120–199)
CHOLEST/HDLC SERPL: 3.6 {RATIO} (ref 2–5)
CO2 SERPL-SCNC: 19 MMOL/L (ref 23–29)
CREAT SERPL-MCNC: 0.7 MG/DL (ref 0.5–1.4)
DIFFERENTIAL METHOD: ABNORMAL
EOSINOPHIL # BLD AUTO: 0.1 K/UL (ref 0–0.5)
EOSINOPHIL NFR BLD: 1.1 % (ref 0–8)
ERYTHROCYTE [DISTWIDTH] IN BLOOD BY AUTOMATED COUNT: 16.4 % (ref 11.5–14.5)
EST. GFR  (AFRICAN AMERICAN): >60 ML/MIN/1.73 M^2
EST. GFR  (NON AFRICAN AMERICAN): >60 ML/MIN/1.73 M^2
ESTIMATED AVG GLUCOSE: 120 MG/DL (ref 68–131)
FERRITIN SERPL-MCNC: 15 NG/ML (ref 20–300)
GLUCOSE SERPL-MCNC: 72 MG/DL (ref 70–110)
HBA1C MFR BLD HPLC: 5.8 % (ref 4–5.6)
HCT VFR BLD AUTO: 33.6 % (ref 37–48.5)
HDLC SERPL-MCNC: 48 MG/DL (ref 40–75)
HDLC SERPL: 27.9 % (ref 20–50)
HGB BLD-MCNC: 9.5 G/DL (ref 12–16)
IMM GRANULOCYTES # BLD AUTO: 0.02 K/UL (ref 0–0.04)
IMM GRANULOCYTES NFR BLD AUTO: 0.3 % (ref 0–0.5)
IRON SERPL-MCNC: 21 UG/DL (ref 30–160)
LDLC SERPL CALC-MCNC: 97.6 MG/DL (ref 63–159)
LYMPHOCYTES # BLD AUTO: 2.1 K/UL (ref 1–4.8)
LYMPHOCYTES NFR BLD: 31.9 % (ref 18–48)
MCH RBC QN AUTO: 22.4 PG (ref 27–31)
MCHC RBC AUTO-ENTMCNC: 28.3 G/DL (ref 32–36)
MCV RBC AUTO: 79 FL (ref 82–98)
MONOCYTES # BLD AUTO: 0.4 K/UL (ref 0.3–1)
MONOCYTES NFR BLD: 5.7 % (ref 4–15)
NEUTROPHILS # BLD AUTO: 4 K/UL (ref 1.8–7.7)
NEUTROPHILS NFR BLD: 60.4 % (ref 38–73)
NONHDLC SERPL-MCNC: 124 MG/DL
NRBC BLD-RTO: 0 /100 WBC
PLATELET # BLD AUTO: 429 K/UL (ref 150–350)
PMV BLD AUTO: 9.6 FL (ref 9.2–12.9)
POTASSIUM SERPL-SCNC: 4 MMOL/L (ref 3.5–5.1)
PROT SERPL-MCNC: 7.3 G/DL (ref 6–8.4)
RBC # BLD AUTO: 4.24 M/UL (ref 4–5.4)
SATURATED IRON: 6 % (ref 20–50)
SODIUM SERPL-SCNC: 142 MMOL/L (ref 136–145)
TOTAL IRON BINDING CAPACITY: 364 UG/DL (ref 250–450)
TRANSFERRIN SERPL-MCNC: 246 MG/DL (ref 200–375)
TRIGL SERPL-MCNC: 132 MG/DL (ref 30–150)
TSH SERPL DL<=0.005 MIU/L-ACNC: 1.01 UIU/ML (ref 0.4–4)
WBC # BLD AUTO: 6.65 K/UL (ref 3.9–12.7)

## 2019-05-21 PROCEDURE — 84443 ASSAY THYROID STIM HORMONE: CPT

## 2019-05-21 PROCEDURE — 99204 OFFICE O/P NEW MOD 45 MIN: CPT | Mod: 25,S$GLB,, | Performed by: FAMILY MEDICINE

## 2019-05-21 PROCEDURE — 93005 EKG 12-LEAD: ICD-10-PCS | Mod: S$GLB,,, | Performed by: FAMILY MEDICINE

## 2019-05-21 PROCEDURE — 81025 URINE PREGNANCY TEST: CPT

## 2019-05-21 PROCEDURE — 83540 ASSAY OF IRON: CPT

## 2019-05-21 PROCEDURE — 93005 ELECTROCARDIOGRAM TRACING: CPT | Mod: S$GLB,,, | Performed by: FAMILY MEDICINE

## 2019-05-21 PROCEDURE — 99204 PR OFFICE/OUTPT VISIT, NEW, LEVL IV, 45-59 MIN: ICD-10-PCS | Mod: 25,S$GLB,, | Performed by: FAMILY MEDICINE

## 2019-05-21 PROCEDURE — 93010 ELECTROCARDIOGRAM REPORT: CPT | Mod: S$GLB,,, | Performed by: INTERNAL MEDICINE

## 2019-05-21 PROCEDURE — 82728 ASSAY OF FERRITIN: CPT

## 2019-05-21 PROCEDURE — 90715 TDAP VACCINE 7 YRS/> IM: CPT | Mod: S$GLB,,, | Performed by: FAMILY MEDICINE

## 2019-05-21 PROCEDURE — 90471 TDAP VACCINE GREATER THAN OR EQUAL TO 7YO IM: ICD-10-PCS | Mod: S$GLB,,, | Performed by: FAMILY MEDICINE

## 2019-05-21 PROCEDURE — 99999 PR PBB SHADOW E&M-EST. PATIENT-LVL IV: CPT | Mod: PBBFAC,,, | Performed by: FAMILY MEDICINE

## 2019-05-21 PROCEDURE — 99999 PR PBB SHADOW E&M-EST. PATIENT-LVL IV: ICD-10-PCS | Mod: PBBFAC,,, | Performed by: FAMILY MEDICINE

## 2019-05-21 PROCEDURE — 80061 LIPID PANEL: CPT

## 2019-05-21 PROCEDURE — 90715 TDAP VACCINE GREATER THAN OR EQUAL TO 7YO IM: ICD-10-PCS | Mod: S$GLB,,, | Performed by: FAMILY MEDICINE

## 2019-05-21 PROCEDURE — 3008F BODY MASS INDEX DOCD: CPT | Mod: CPTII,S$GLB,, | Performed by: FAMILY MEDICINE

## 2019-05-21 PROCEDURE — 3008F PR BODY MASS INDEX (BMI) DOCUMENTED: ICD-10-PCS | Mod: CPTII,S$GLB,, | Performed by: FAMILY MEDICINE

## 2019-05-21 PROCEDURE — 83036 HEMOGLOBIN GLYCOSYLATED A1C: CPT

## 2019-05-21 PROCEDURE — 80053 COMPREHEN METABOLIC PANEL: CPT

## 2019-05-21 PROCEDURE — 36415 COLL VENOUS BLD VENIPUNCTURE: CPT | Mod: PO

## 2019-05-21 PROCEDURE — 93010 EKG 12-LEAD: ICD-10-PCS | Mod: S$GLB,,, | Performed by: INTERNAL MEDICINE

## 2019-05-21 PROCEDURE — 90471 IMMUNIZATION ADMIN: CPT | Mod: S$GLB,,, | Performed by: FAMILY MEDICINE

## 2019-05-21 PROCEDURE — 85025 COMPLETE CBC W/AUTO DIFF WBC: CPT

## 2019-05-21 NOTE — PROGRESS NOTES
Chief Complaint   Patient presents with    Anemia    Abdominal Pain    Pre-diabetes     testing       HPI  Zonia Skaggs is a 43 y.o. female with multiple medical diagnoses as listed in the medical history and problem list that presents for establishment of care . She has concerns regarding anemia, abdominal pain and prediabetes.    DM- she reports her mother had this and she has been having dizziness and has had weight gain. No hx of gestational Dm    Anemia- has had this since childhood and has been made worse during pregnancies. She has fatigue. She has had heart palpitations and flushing. She also feels faint. No episodes of syncope+ Tinnitus w/ room spinning, has episodes of chest pain lasting 10-20 min    Abdominal pain- she has been having this since her fifth pregnancy. All five pregnancies were natural. For about four years.  She reports pain in her abdomen w/ hardening of the area , mostly after eating. She alternates btw diarrhea and constipation.    PAST MEDICAL HISTORY:  Past Medical History:   Diagnosis Date    Bipolar 1 disorder     History of psychiatric hospitalization     Hx of psychiatric care     Psychiatric problem     Therapy     used to follow with Dr. 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   Social Needs    Financial resource strain: Not very hard    Food insecurity:     Worry: Sometimes true     Inability: Sometimes true    Transportation needs:     Medical: No     Non-medical: No   Tobacco Use    Smoking status: Never Smoker    Smokeless tobacco: Never Used   Substance and Sexual Activity    Alcohol use: No      Frequency: Never     Binge frequency: Never    Drug use: No    Sexual activity: Yes     Partners: Male     Birth control/protection: None   Lifestyle    Physical activity:     Days per week: 3 days     Minutes per session: 20 min    Stress: To some extent   Relationships    Social connections:     Talks on phone: Three times a week     Gets together: Never     Attends [employer] service: Not on file     Active member of club or organization: No     Attends meetings of clubs or organizations: Never     Relationship status:    Other Topics Concern    Patient feels they ought to cut down on drinking/drug use Not Asked    Patient annoyed by others criticizing their drinking/drug use Not Asked    Patient has felt bad or guilty about drinking/drug use Not Asked    Patient has had a drink/used drugs as an eye opener in the AM Not Asked   Social History Narrative    Not on file       FAMILY HISTORY:  Family History   Problem Relation Age of Onset    Bipolar disorder Maternal Aunt     Bipolar disorder Paternal Aunt     Diabetes type II Mother     Colon cancer Neg Hx     Ovarian cancer Neg Hx     Anxiety disorder Neg Hx     Depression Neg Hx     Suicide Neg Hx        ALLERGIES AND MEDICATIONS: updated and reviewed.  Review of patient's allergies indicates:   Allergen Reactions    Penicillins Hives and Shortness Of Breath     Current Outpatient Medications   Medication Sig Dispense Refill    cetirizine (ZYRTEC) 10 MG tablet Take 10 mg by mouth once daily.      ibuprofen (ADVIL,MOTRIN) 200 MG tablet Take 200 mg by mouth every 6 (six) hours as needed for Pain.      omeprazole (PRILOSEC OTC) 20 MG tablet Take 1 tablet by mouth Daily.       No current facility-administered medications for this visit.        ROS  Review of Systems   Constitutional: Positive for activity change and unexpected weight change.   HENT: Positive for rhinorrhea. Negative for hearing loss and trouble swallowing.    Eyes:  "Negative for discharge and visual disturbance.   Respiratory: Positive for chest tightness. Negative for wheezing.    Cardiovascular: Positive for chest pain and palpitations.   Gastrointestinal: Positive for constipation and vomiting. Negative for blood in stool and diarrhea.   Endocrine: Positive for polydipsia and polyuria.   Genitourinary: Negative for difficulty urinating, dysuria, hematuria and menstrual problem.   Musculoskeletal: Positive for arthralgias and joint swelling. Negative for neck pain.   Neurological: Positive for weakness. Negative for headaches.   Psychiatric/Behavioral: Negative for confusion and dysphoric mood.       Physical Exam  Vitals:    05/21/19 0941   BP: 126/78   Pulse: 93   Resp: 18   Temp: 97.6 °F (36.4 °C)   TempSrc: Oral   SpO2: 99%   Weight: 110.5 kg (243 lb 9.7 oz)   Height: 5' 5" (1.651 m)    Body mass index is 40.54 kg/m².  Weight: 110.5 kg (243 lb 9.7 oz)   Height: 5' 5" (165.1 cm)     Physical Exam   Constitutional: She is oriented to person, place, and time. She appears well-developed and well-nourished. No distress.   HENT:   Head: Normocephalic and atraumatic.   Right Ear: Tympanic membrane normal.   Left Ear: Tympanic membrane normal.   Nose: Nose normal.   Mouth/Throat: No oropharyngeal exudate.   Pallor in both conjuncitivits   Eyes: EOM are normal.   Neck: Neck supple. No thyromegaly present.   Cardiovascular: Normal rate and regular rhythm. Exam reveals no gallop and no friction rub.   No murmur heard.  Pulmonary/Chest: Effort normal and breath sounds normal. No respiratory distress. She has no wheezes. She has no rales.   Abdominal: Soft. Bowel sounds are normal. She exhibits no distension and no mass. There is no tenderness. There is no rebound and no guarding. A hernia is present.   Suspect midline diastasis recti   Lymphadenopathy:     She has no cervical adenopathy.   Neurological: She is alert and oriented to person, place, and time.   Skin: Skin is warm and " dry. No rash noted.   Psychiatric: She has a normal mood and affect. Her behavior is normal.   Nursing note and vitals reviewed.      Health Maintenance       Date Due Completion Date    TETANUS VACCINE 10/21/1995 ---    Pneumococcal Vaccine (Medium Risk) (1 of 1 - PPSV23) 10/21/1996 ---    Pap Smear 01/22/2015 1/22/2014    Mammogram 10/21/2017 ---    Influenza Vaccine 08/01/2019 ---          Health maintenance reviewed and addressed as ordered      ASSESSMENT     1. Bipolar 1 disorder    2. Fatigue, unspecified type    3. Iron deficiency anemia, unspecified iron deficiency anemia type    4. Lower abdominal pain    5. Screening for hyperlipidemia    6. Need for tetanus booster    7. Palpitations        PLAN:     Problem List Items Addressed This Visit        Psychiatric    Bipolar 1 disorder - Primary  -currently reports some difficulty, she is going to call her psychiatrist but has concerns regarding past hx of weight gain on medications       Oncology    Iron deficiency anemia  -will check levels due to hx of anemia as she may be symptomatic    Relevant Orders    Iron and TIBC    Ferritin       GI    Lower abdominal pain  -will eval for diastasis recti    Relevant Orders    CT Abdomen Pelvis With Contrast    PREGNANCY TEST, URINE RAPID      Other Visit Diagnoses     Fatigue, unspecified type    -consider anemia, DM, anxiety      Relevant Orders    CBC auto differential    Comprehensive metabolic panel    Hemoglobin A1c    TSH    Screening for hyperlipidemia        Relevant Orders    Lipid panel    Need for tetanus booster      -as ordered       Relevant Orders    (In Office Administered) Tdap Vaccine    Palpitations      -NSR    Relevant Orders    EKG 12-lead            Snehal Reynoso MD  05/21/2019 9:53 AM        Follow up in about 6 weeks (around 7/2/2019) for Follow up.

## 2019-05-24 ENCOUNTER — PATIENT MESSAGE (OUTPATIENT)
Dept: FAMILY MEDICINE | Facility: CLINIC | Age: 44
End: 2019-05-24

## 2019-05-24 DIAGNOSIS — Z12.39 BREAST CANCER SCREENING: ICD-10-CM

## 2019-06-20 ENCOUNTER — HOSPITAL ENCOUNTER (OUTPATIENT)
Dept: RADIOLOGY | Facility: HOSPITAL | Age: 44
Discharge: HOME | End: 2019-06-20
Attending: FAMILY MEDICINE
Payer: COMMERCIAL

## 2019-06-20 DIAGNOSIS — N92.6 IRREGULAR MENSES: Primary | ICD-10-CM

## 2019-06-20 DIAGNOSIS — Z12.39 BREAST CANCER SCREENING: ICD-10-CM

## 2019-06-20 DIAGNOSIS — R10.30 LOWER ABDOMINAL PAIN: ICD-10-CM

## 2019-06-20 PROCEDURE — 77067 MAMMO DIGITAL SCREENING BILAT WITH TOMOSYNTHESIS_CAD: ICD-10-PCS | Mod: 26,,, | Performed by: RADIOLOGY

## 2019-06-20 PROCEDURE — 77063 BREAST TOMOSYNTHESIS BI: CPT | Mod: 26,,, | Performed by: RADIOLOGY

## 2019-06-20 PROCEDURE — 74177 CT ABD & PELVIS W/CONTRAST: CPT | Mod: TC

## 2019-06-20 PROCEDURE — 25500020 PHARM REV CODE 255: Performed by: FAMILY MEDICINE

## 2019-06-20 PROCEDURE — 77067 SCR MAMMO BI INCL CAD: CPT | Mod: TC

## 2019-06-20 PROCEDURE — 77063 MAMMO DIGITAL SCREENING BILAT WITH TOMOSYNTHESIS_CAD: ICD-10-PCS | Mod: 26,,, | Performed by: RADIOLOGY

## 2019-06-20 PROCEDURE — 74177 CT ABD & PELVIS W/CONTRAST: CPT | Mod: 26,,, | Performed by: RADIOLOGY

## 2019-06-20 PROCEDURE — 74177 CT ABDOMEN PELVIS WITH CONTRAST: ICD-10-PCS | Mod: 26,,, | Performed by: RADIOLOGY

## 2019-06-20 PROCEDURE — 77067 SCR MAMMO BI INCL CAD: CPT | Mod: 26,,, | Performed by: RADIOLOGY

## 2019-06-20 RX ADMIN — IOHEXOL 15 ML: 300 INJECTION, SOLUTION INTRAVENOUS at 11:06

## 2019-06-20 RX ADMIN — IOHEXOL 85 ML: 350 INJECTION, SOLUTION INTRAVENOUS at 12:06

## 2019-06-21 ENCOUNTER — TELEPHONE (OUTPATIENT)
Dept: FAMILY MEDICINE | Facility: CLINIC | Age: 44
End: 2019-06-21

## 2019-06-25 ENCOUNTER — TELEPHONE (OUTPATIENT)
Dept: OTOLARYNGOLOGY | Facility: CLINIC | Age: 44
End: 2019-06-25

## 2019-06-25 DIAGNOSIS — K46.9 HERNIA OF ABDOMINAL CAVITY: Primary | ICD-10-CM

## 2019-06-25 NOTE — TELEPHONE ENCOUNTER
----- Message from Natividad Barnard sent at 6/25/2019  8:16 AM CDT -----  Contact: CRISTIAN ARNOLD   Name of Who is Calling: CRISTIAN ARNOLD       What is the request in detail: Patient is requesting a call back she states she needs a referral to have a hernia removed       Can the clinic reply by [facility]: no      What Number to Call Back if not in [facility]: [phone]

## 2019-07-16 ENCOUNTER — PATIENT MESSAGE (OUTPATIENT)
Dept: FAMILY MEDICINE | Facility: CLINIC | Age: 44
End: 2019-07-16

## 2019-07-17 ENCOUNTER — INITIAL CONSULT (OUTPATIENT)
Dept: SURGERY | Facility: CLINIC | Age: 44
End: 2019-07-17
Payer: COMMERCIAL

## 2019-07-17 VITALS
DIASTOLIC BLOOD PRESSURE: 83 MMHG | HEIGHT: 65 IN | HEART RATE: 99 BPM | WEIGHT: 243.63 LBS | BODY MASS INDEX: 40.59 KG/M2 | SYSTOLIC BLOOD PRESSURE: 121 MMHG

## 2019-07-17 DIAGNOSIS — K43.9 VENTRAL HERNIA WITHOUT OBSTRUCTION OR GANGRENE: Primary | ICD-10-CM

## 2019-07-17 PROCEDURE — 99999 PR PBB SHADOW E&M-EST. PATIENT-LVL IV: ICD-10-PCS | Mod: PBBFAC,,, | Performed by: SURGERY

## 2019-07-17 PROCEDURE — 99205 PR OFFICE/OUTPT VISIT, NEW, LEVL V, 60-74 MIN: ICD-10-PCS | Mod: S$GLB,,, | Performed by: SURGERY

## 2019-07-17 PROCEDURE — 99205 OFFICE O/P NEW HI 60 MIN: CPT | Mod: S$GLB,,, | Performed by: SURGERY

## 2019-07-17 PROCEDURE — 99999 PR PBB SHADOW E&M-EST. PATIENT-LVL IV: CPT | Mod: PBBFAC,,, | Performed by: SURGERY

## 2019-07-17 RX ORDER — LIDOCAINE HYDROCHLORIDE 10 MG/ML
1 INJECTION, SOLUTION EPIDURAL; INFILTRATION; INTRACAUDAL; PERINEURAL ONCE
Status: CANCELLED | OUTPATIENT
Start: 2019-07-17 | End: 2019-07-17

## 2019-07-17 RX ORDER — SODIUM CHLORIDE 9 MG/ML
INJECTION, SOLUTION INTRAVENOUS CONTINUOUS
Status: CANCELLED | OUTPATIENT
Start: 2019-07-17

## 2019-07-17 NOTE — H&P (VIEW-ONLY)
History and Physical Exam    Patient ID: Zonia Skaggs is a 43 y.o. female.    Chief Complaint: Hernia      HPI:  43-year-old woman with a history of midline hernia. She has had for some time and has gradually gotten larger.  Her most recent childbirth and enlarged significantly.  It is associated with abdominal pain. It is not reducible.  The pain is moderate to severe in nature episodic and crampy.  It is nonradiating and has no specific relieving or exacerbating factors other than exertional activity makes it worse.      Review of Systems   Constitutional: Negative for chills and unexpected weight change.   HENT: Negative for congestion, ear pain and sore throat.    Eyes: Negative for pain and redness.   Respiratory: Negative for cough and shortness of breath.    Cardiovascular: Negative for chest pain and palpitations.   Gastrointestinal: Negative for abdominal distention, abdominal pain and constipation.   Endocrine: Negative for cold intolerance and heat intolerance.   Genitourinary: Negative for dysuria and frequency.   Musculoskeletal: Negative for back pain and neck pain.   Skin: Negative for pallor and rash.   Neurological: Negative for syncope, light-headedness and headaches.   Hematological: Negative for adenopathy. Does not bruise/bleed easily.   Psychiatric/Behavioral: Negative for confusion and hallucinations.       Current Outpatient Medications   Medication Sig Dispense Refill    cetirizine (ZYRTEC) 10 MG tablet Take 10 mg by mouth once daily.      ibuprofen (ADVIL,MOTRIN) 200 MG tablet Take 200 mg by mouth every 6 (six) hours as needed for Pain.      omeprazole (PRILOSEC OTC) 20 MG tablet Take 1 tablet by mouth Daily.       No current facility-administered medications for this visit.        Review of patient's allergies indicates:   Allergen Reactions    Penicillins Hives and Shortness Of Breath       Past Medical History:   Diagnosis Date    Bipolar 1 disorder     History of psychiatric  hospitalization     Hx of psychiatric care     Psychiatric problem     Therapy     used to follow with Dr. Gerber Rogers       Past Surgical History:   Procedure Laterality Date    FINGER FRACTURE SURGERY         Family History   Problem Relation Age of Onset    Bipolar disorder Maternal Aunt     Bipolar disorder Paternal Aunt     Diabetes type II Mother     Colon cancer Neg Hx     Ovarian cancer Neg Hx     Anxiety disorder Neg Hx     Depression Neg Hx     Suicide Neg Hx        Social History     Socioeconomic History    Marital status:      Spouse name: Not on file    Number of children: 5    Years of education: Not on file    Highest education level: Not on file   Occupational History    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   Social Needs    Financial resource strain: Not very hard    Food insecurity:     Worry: Sometimes true     Inability: Sometimes true    Transportation needs:     Medical: No     Non-medical: No   Tobacco Use    Smoking status: Never Smoker    Smokeless tobacco: Never Used   Substance and Sexual Activity    Alcohol use: No     Frequency: Never     Binge frequency: Never    Drug use: No    Sexual activity: Yes     Partners: Male     Birth control/protection: None   Lifestyle    Physical activity:     Days per week: 3 days     Minutes per session: 20 min    Stress: To some extent   Relationships    Social connections:     Talks on phone: Three times a week     Gets together: Never     Attends [employer] service: Not on file     Active member of club or organization: No     Attends meetings of clubs or organizations: Never     Relationship status:    Other Topics Concern    Patient feels they ought to cut down on drinking/drug use Not Asked    Patient annoyed by others criticizing their drinking/drug use Not Asked    Patient has felt bad or guilty about drinking/drug use Not Asked    Patient has had a drink/used drugs as an eye opener in the AM Not Asked    Social History Narrative    Not on file       Vitals:    07/17/19 0901   BP: 121/83   Pulse: 99       Physical Exam   Constitutional: She is oriented to person, place, and time. She appears well-developed and well-nourished.   Morbidly obese   HENT:   Head: Normocephalic and atraumatic.   Eyes: Pupils are equal, round, and reactive to light. EOM are normal.   Neck: Normal range of motion. Neck supple.   Cardiovascular: Normal rate and regular rhythm.   No murmur heard.  Pulmonary/Chest: Effort normal and breath sounds normal. She has no wheezes.   Abdominal: Soft. She exhibits mass (Large ventral hernia which is incarcerated and minimally tender). She exhibits no distension. There is no tenderness.   Musculoskeletal: Normal range of motion.   Neurological: She is alert and oriented to person, place, and time.   Skin: Skin is warm and dry. Capillary refill takes less than 2 seconds. No rash noted.   Psychiatric: She has a normal mood and affect. Judgment normal.       Assessment & Plan:    moderate to large ventral hernia midway between the umbilicus in the subxiphoid, incarcerated, without signs of obstruction or gangrene.  We discussed the surgery and will schedule as an outpatient, however due to the size she may need to spend overnight in the hospital.  Her risk factors for a complication from surgery include morbid obesity and diabetes.  Plan open repair with mesh.     Risks, benefits, alternatives to the procedure were discussed with the patient, who appears to understand and agree with the treatment plan.

## 2019-07-17 NOTE — PATIENT INSTRUCTIONS
Hernia (Adult)    A hernia can happen when there is a weakness or defect in the wall of the abdomen or groin. Intestines or nearby tissues may move from their usual location and push through the weakness in the wall. This can cause a hernia (bulge) you may see or feel.  Causes and Risk Factors   A hernia may be present at birth. Or it may be caused by the wear and tear of daily living. Certain factors can make a hernia more likely. These can include:  · Heavy lifting  · Straining, whether from lifting, movement, or constipation  · Chronic cough  · Injury to the abdominal wall  · Excess weight  · Pregnancy  · Prior surgery  · Older age  · Family history of hernia  Symptoms  Symptoms of a hernia may come on suddenly. Or they may appear slowly over time. Some common symptoms include:  · Bulge in the groin area, around the navel, or in the scrotum (the bulge may get bigger when you stand and go away when you lie down)  · Pain or pressure around the bulge  · Pain during activities such as lifting, coughing, or sneezing  · A feeling of weakness or pressure in the groin  · Pain or swelling in the scrotum  Types of hernias  There are different types of hernia. The type you have depends on its location:  · Inguinal: This type is in the groin or scrotum. It is more common in men.  · Femoral: This type is in the groin, upper thigh (where the leg bends), or labia. It is more common in women.  · Ventral: This type is in the abdominal wall.  · Umbilical: This type occurs around the navel (belly button).  · Incisional: This type occurs at the site of a previous surgery.  The condition of the hernia can help determine how urgently it needs to be treated.  · Reducible: It goes back in by itself, or it can be pushed back in.  · Irreducible: It cant be pushed back in.  · Incarcerated/Strangulated: The intestine is trapped (incarcerated). If this happens, you wont be able to push the bulge back in. If the incarcerated hernia isnt  treated, it may become strangulated. This means the area loses blood supply and the tissue may die. This requires emergency surgery! Treatment is needed right away!  In most cases, a hernia will not heal on its own. Surgery is usually needed to repair the defect in the abdominal wall or groin. Youll be told more about surgery, if needed.  If your symptoms are not severe, treatment may sometimes be delayed. In such cases, regular follow-up visits with the provider will be needed. Youll be asked to keep track of your symptoms and to watch for signs of more serious problems. You may also be given guidelines similar to the home care instructions below.  Home Care  To help keep a hernia from getting worse, you may be advised to:  · Avoid heavy lifting and straining as directed.  · Take steps to prevent constipation, such as eating more fiber and drinking more water. This may help reduce straining that can occur when having a bowel movement. Reducing straining may help keep your symptoms from getting worse.  · Maintain a healthy weight or lose excess weight. This can help reduce strain on abdominal muscles and tissues.  · Stop smoking. This can help prevent coughing that may also strain abdominal muscles and tissues.  Follow-up care  Follow up with your healthcare provider, or as directed. If imaging tests were done, they will be reviewed a doctor. You will be told the results and any new findings that may affect your care.  When to seek medical advice  Call your healthcare provider right away if any of these occur:  · Hernia hardens, swells, or grows larger  · Hernia can no longer be pushed back in  · Pain moves to the lower right abdomen (just below the waistline), or spreads to the back  Call 911  Call 911 right away if any of these occur:  · Nausea and vomiting  · Severe pain, redness, or tenderness in the area near the hernia  · Pain worsens quickly and doesnt get better  · Inability to have a bowel movement or  pass gas  · Fever of 100.4°F (38°C) or higher  · Trouble breathing  · Fainting  · Rapid heart rate  · Vomiting blood  · Large amounts of blood in stool  Date Last Reviewed: 6/9/2015  © [id number] [employer]. [address] [place] [address], [place] [address]. All rights reserved. This information is not intended as a substitute for professional medical care. Always follow your healthcare professional's instructions.

## 2019-07-22 ENCOUNTER — ANESTHESIA EVENT (OUTPATIENT)
Dept: SURGERY | Facility: HOSPITAL | Age: 44
End: 2019-07-22
Payer: COMMERCIAL

## 2019-07-22 ENCOUNTER — HOSPITAL ENCOUNTER (OUTPATIENT)
Dept: PREADMISSION TESTING | Facility: HOSPITAL | Age: 44
Discharge: HOME | End: 2019-07-22
Attending: SURGERY
Payer: COMMERCIAL

## 2019-07-22 ENCOUNTER — HOSPITAL ENCOUNTER (OUTPATIENT)
Dept: RADIOLOGY | Facility: HOSPITAL | Age: 44
Discharge: HOME | End: 2019-07-22
Attending: SURGERY
Payer: COMMERCIAL

## 2019-07-22 VITALS
SYSTOLIC BLOOD PRESSURE: 119 MMHG | OXYGEN SATURATION: 98 % | BODY MASS INDEX: 41.47 KG/M2 | TEMPERATURE: 97 F | WEIGHT: 248.88 LBS | DIASTOLIC BLOOD PRESSURE: 81 MMHG | HEART RATE: 93 BPM | HEIGHT: 65 IN | RESPIRATION RATE: 17 BRPM

## 2019-07-22 DIAGNOSIS — K43.9 VENTRAL HERNIA WITHOUT OBSTRUCTION OR GANGRENE: ICD-10-CM

## 2019-07-22 DIAGNOSIS — Z01.818 PRE-OP TESTING: Primary | ICD-10-CM

## 2019-07-22 LAB
ANION GAP SERPL CALC-SCNC: 8 MMOL/L (ref 8–16)
B-HCG UR QL: NEGATIVE
BACTERIA #/AREA URNS HPF: ABNORMAL /HPF
BASOPHILS # BLD AUTO: 0.02 K/UL (ref 0–0.2)
BASOPHILS NFR BLD: 0.3 % (ref 0–1.9)
BILIRUB UR QL STRIP: NEGATIVE
BUN SERPL-MCNC: 11 MG/DL (ref 6–20)
CALCIUM SERPL-MCNC: 9.6 MG/DL (ref 8.7–10.5)
CHLORIDE SERPL-SCNC: 105 MMOL/L (ref 95–110)
CLARITY UR: CLEAR
CO2 SERPL-SCNC: 28 MMOL/L (ref 23–29)
COLOR UR: YELLOW
CREAT SERPL-MCNC: 0.8 MG/DL (ref 0.5–1.4)
DIFFERENTIAL METHOD: ABNORMAL
EOSINOPHIL # BLD AUTO: 0.2 K/UL (ref 0–0.5)
EOSINOPHIL NFR BLD: 2 % (ref 0–8)
ERYTHROCYTE [DISTWIDTH] IN BLOOD BY AUTOMATED COUNT: 16.3 % (ref 11.5–14.5)
EST. GFR  (AFRICAN AMERICAN): >60 ML/MIN/1.73 M^2
EST. GFR  (NON AFRICAN AMERICAN): >60 ML/MIN/1.73 M^2
GLUCOSE SERPL-MCNC: 91 MG/DL (ref 70–110)
GLUCOSE UR QL STRIP: NEGATIVE
HCT VFR BLD AUTO: 36 % (ref 37–48.5)
HGB BLD-MCNC: 10.5 G/DL (ref 12–16)
HGB UR QL STRIP: ABNORMAL
KETONES UR QL STRIP: NEGATIVE
LEUKOCYTE ESTERASE UR QL STRIP: NEGATIVE
LYMPHOCYTES # BLD AUTO: 1.7 K/UL (ref 1–4.8)
LYMPHOCYTES NFR BLD: 22.4 % (ref 18–48)
MCH RBC QN AUTO: 23.1 PG (ref 27–31)
MCHC RBC AUTO-ENTMCNC: 29.2 G/DL (ref 32–36)
MCV RBC AUTO: 79 FL (ref 82–98)
MICROSCOPIC COMMENT: ABNORMAL
MONOCYTES # BLD AUTO: 0.5 K/UL (ref 0.3–1)
MONOCYTES NFR BLD: 6.9 % (ref 4–15)
NEUTROPHILS # BLD AUTO: 5.1 K/UL (ref 1.8–7.7)
NEUTROPHILS NFR BLD: 68.9 % (ref 38–73)
NITRITE UR QL STRIP: NEGATIVE
PH UR STRIP: 7 [PH] (ref 5–8)
PLATELET # BLD AUTO: 430 K/UL (ref 150–350)
PMV BLD AUTO: 9.1 FL (ref 9.2–12.9)
POTASSIUM SERPL-SCNC: 4.1 MMOL/L (ref 3.5–5.1)
PROT UR QL STRIP: NEGATIVE
RBC # BLD AUTO: 4.55 M/UL (ref 4–5.4)
RBC #/AREA URNS HPF: 2 /HPF (ref 0–4)
SODIUM SERPL-SCNC: 141 MMOL/L (ref 136–145)
SP GR UR STRIP: 1 (ref 1–1.03)
SQUAMOUS #/AREA URNS HPF: 5 /HPF
URN SPEC COLLECT METH UR: ABNORMAL
UROBILINOGEN UR STRIP-ACNC: NEGATIVE EU/DL
WBC # BLD AUTO: 7.51 K/UL (ref 3.9–12.7)
WBC #/AREA URNS HPF: 2 /HPF (ref 0–5)

## 2019-07-22 PROCEDURE — 81025 URINE PREGNANCY TEST: CPT

## 2019-07-22 PROCEDURE — 36415 COLL VENOUS BLD VENIPUNCTURE: CPT

## 2019-07-22 PROCEDURE — 85025 COMPLETE CBC W/AUTO DIFF WBC: CPT

## 2019-07-22 PROCEDURE — 71046 X-RAY EXAM CHEST 2 VIEWS: CPT | Mod: 26,,, | Performed by: RADIOLOGY

## 2019-07-22 PROCEDURE — 80048 BASIC METABOLIC PNL TOTAL CA: CPT

## 2019-07-22 PROCEDURE — 81000 URINALYSIS NONAUTO W/SCOPE: CPT

## 2019-07-22 PROCEDURE — 71046 XR CHEST PA AND LATERAL PRE-OP: ICD-10-PCS | Mod: 26,,, | Performed by: RADIOLOGY

## 2019-07-22 PROCEDURE — 71046 X-RAY EXAM CHEST 2 VIEWS: CPT | Mod: TC,FY

## 2019-07-22 RX ORDER — FERROUS SULFATE 325(65) MG
325 TABLET, DELAYED RELEASE (ENTERIC COATED) ORAL
Status: ON HOLD | COMMUNITY
End: 2019-11-26 | Stop reason: HOSPADM

## 2019-07-22 RX ORDER — DOCUSATE SODIUM 100 MG/1
100 CAPSULE, LIQUID FILLED ORAL DAILY
Status: ON HOLD | COMMUNITY
End: 2019-11-26 | Stop reason: HOSPADM

## 2019-07-22 NOTE — DISCHARGE INSTRUCTIONS
"  Your surgery is scheduled for _Tuesday July 23, 2019__.    Call [phone] between 2 p.m. and 5 p.m. on   __Today__ to find out your arrival time for the day of your surgery.      Please report to SAME DAY SURGERY UNIT on the 2nd FLOOR at _______ a.m.  Use front door entrance. The doors open at 0530 am.      If you need WHEELCHAIR assistance please call  [phone] from your cell phone or "0"  from the  hospital courtesy phone located to the right after you enter the hospital lobby.      INSTRUCTIONS IMPORTANT!!!  ¨ Do not eat or drink after 12 midnight-including water. OK to brush teeth, no   gum, candy or mints!    ¨ Take only these medicines with a small swallow of water-morning of surgery.  Take Omeprazole and Zyrtec with water (if needed).      _x___  Prep instructions:    SHOWER     _x___  Please shower using Hibiclens soap the night before AND  the morning of  your surgery/procedure. Do not use Hibiclens on your face or genitals       x        If your surgery is around your belly button (Navel) be sure to wash inside your belly button also. Rinse hibiclens off completely.  _x___  No shaving of procedural area at least 4-5 days before surgery due to  increased risk of skin irritation and/or possible infection.  _x___  Do not wear makeup, including mascara. WEARING EYE MAKEUP MAY  LEAD TO SERIOUS EYE INJURY during surgery.  _x___  No powder, lotions or creams to your body.  _x___  You may wear only deodorant on the day of surgery.  _x___  Please remove all jewelry, including piercings and leave at home.  _x___  No money or valuables needed. Please leave at home.  You may bring your cell phone.  _x___  Please bring any documents given by your doctor.  _x___  If going home the same day, arrange for a ride home. You will not be able to drive if Anesthesia was used.    _x___  Wear loose fitting clothing. Allow for dressings, bandages.  _x___  Stop Aspirin, Ibuprofen, Motrin and Aleve at least 3-5 days before  " surgery, unless otherwise instructed by your doctor, or the nurse.              You MAY use Tylenol/acetaminophen until day of surgery.  _x___  Call MD for temperature above 101 degrees.        _x___ Stop taking any Fish Oil supplement or any Vitamins that contain Vitamin  E at least 5 days prior to surgery.              I have read or had read and explained to me, and understand the above information.  Additional comments or instructions:Please call   [phone] if you have any questions regarding the instructions above.

## 2019-07-23 ENCOUNTER — ANESTHESIA (OUTPATIENT)
Dept: SURGERY | Facility: HOSPITAL | Age: 44
End: 2019-07-23
Payer: COMMERCIAL

## 2019-07-23 ENCOUNTER — HOSPITAL ENCOUNTER (OUTPATIENT)
Facility: HOSPITAL | Age: 44
Discharge: STILL ADMITTED OR ADMINISTRATIVE | End: 2019-07-24
Attending: SURGERY | Admitting: SURGERY
Payer: COMMERCIAL

## 2019-07-23 DIAGNOSIS — K43.9 VENTRAL HERNIA WITHOUT OBSTRUCTION OR GANGRENE: ICD-10-CM

## 2019-07-23 PROCEDURE — D9220A PRA ANESTHESIA: ICD-10-PCS | Mod: ANES,,, | Performed by: ANESTHESIOLOGY

## 2019-07-23 PROCEDURE — 36000707: Performed by: SURGERY

## 2019-07-23 PROCEDURE — 71000039 HC RECOVERY, EACH ADD'L HOUR: Performed by: SURGERY

## 2019-07-23 PROCEDURE — 49568 PR IMPLANT MESH HERNIA REPAIR/DEBRIDEMENT CLOSURE: ICD-10-PCS | Mod: ,,, | Performed by: SURGERY

## 2019-07-23 PROCEDURE — 63600175 PHARM REV CODE 636 W HCPCS: Performed by: ANESTHESIOLOGY

## 2019-07-23 PROCEDURE — 82962 GLUCOSE BLOOD TEST: CPT | Performed by: SURGERY

## 2019-07-23 PROCEDURE — G0378 HOSPITAL OBSERVATION PER HR: HCPCS

## 2019-07-23 PROCEDURE — 49561 PR REPAIR INCISIONAL HERNIA,STRANG: ICD-10-PCS | Mod: ,,, | Performed by: SURGERY

## 2019-07-23 PROCEDURE — 25000003 PHARM REV CODE 250: Performed by: SURGERY

## 2019-07-23 PROCEDURE — 25000003 PHARM REV CODE 250: Performed by: ANESTHESIOLOGY

## 2019-07-23 PROCEDURE — 25000003 PHARM REV CODE 250: Performed by: NURSE ANESTHETIST, CERTIFIED REGISTERED

## 2019-07-23 PROCEDURE — C1781 MESH (IMPLANTABLE): HCPCS | Performed by: SURGERY

## 2019-07-23 PROCEDURE — 63600175 PHARM REV CODE 636 W HCPCS: Performed by: SURGERY

## 2019-07-23 PROCEDURE — D9220A PRA ANESTHESIA: Mod: ANES,,, | Performed by: ANESTHESIOLOGY

## 2019-07-23 PROCEDURE — 71000016 HC POSTOP RECOV ADDL HR: Performed by: SURGERY

## 2019-07-23 PROCEDURE — 49568 PR IMPLANT MESH HERNIA REPAIR/DEBRIDEMENT CLOSURE: CPT | Mod: ,,, | Performed by: SURGERY

## 2019-07-23 PROCEDURE — 63600175 PHARM REV CODE 636 W HCPCS: Performed by: NURSE ANESTHETIST, CERTIFIED REGISTERED

## 2019-07-23 PROCEDURE — 36000706: Performed by: SURGERY

## 2019-07-23 PROCEDURE — 49561 PR REPAIR INCISIONAL HERNIA,STRANG: CPT | Mod: ,,, | Performed by: SURGERY

## 2019-07-23 PROCEDURE — D9220A PRA ANESTHESIA: ICD-10-PCS | Mod: CRNA,,, | Performed by: NURSE ANESTHETIST, CERTIFIED REGISTERED

## 2019-07-23 PROCEDURE — 71000033 HC RECOVERY, INTIAL HOUR: Performed by: SURGERY

## 2019-07-23 PROCEDURE — 37000008 HC ANESTHESIA 1ST 15 MINUTES: Performed by: SURGERY

## 2019-07-23 PROCEDURE — 71000015 HC POSTOP RECOV 1ST HR: Performed by: SURGERY

## 2019-07-23 PROCEDURE — S0077 INJECTION, CLINDAMYCIN PHOSP: HCPCS | Performed by: SURGERY

## 2019-07-23 PROCEDURE — D9220A PRA ANESTHESIA: Mod: CRNA,,, | Performed by: NURSE ANESTHETIST, CERTIFIED REGISTERED

## 2019-07-23 PROCEDURE — 37000009 HC ANESTHESIA EA ADD 15 MINS: Performed by: SURGERY

## 2019-07-23 DEVICE — PATCH HERNIA VENTRIO ST.: Type: IMPLANTABLE DEVICE | Site: ABDOMEN | Status: FUNCTIONAL

## 2019-07-23 RX ORDER — NEOSTIGMINE METHYLSULFATE 1 MG/ML
INJECTION, SOLUTION INTRAVENOUS
Status: DISCONTINUED | OUTPATIENT
Start: 2019-07-23 | End: 2019-07-23

## 2019-07-23 RX ORDER — HYDROMORPHONE HYDROCHLORIDE 2 MG/ML
1 INJECTION, SOLUTION INTRAMUSCULAR; INTRAVENOUS; SUBCUTANEOUS
Status: DISCONTINUED | OUTPATIENT
Start: 2019-07-23 | End: 2019-07-24

## 2019-07-23 RX ORDER — ONDANSETRON 2 MG/ML
4 INJECTION INTRAMUSCULAR; INTRAVENOUS ONCE
Status: COMPLETED | OUTPATIENT
Start: 2019-07-23 | End: 2019-07-23

## 2019-07-23 RX ORDER — PROMETHAZINE HYDROCHLORIDE 25 MG/ML
12.5 INJECTION, SOLUTION INTRAMUSCULAR; INTRAVENOUS ONCE
Status: DISCONTINUED | OUTPATIENT
Start: 2019-07-23 | End: 2019-07-23

## 2019-07-23 RX ORDER — ONDANSETRON 2 MG/ML
INJECTION INTRAMUSCULAR; INTRAVENOUS
Status: DISCONTINUED | OUTPATIENT
Start: 2019-07-23 | End: 2019-07-23

## 2019-07-23 RX ORDER — SUCCINYLCHOLINE CHLORIDE 20 MG/ML
INJECTION INTRAMUSCULAR; INTRAVENOUS
Status: DISCONTINUED | OUTPATIENT
Start: 2019-07-23 | End: 2019-07-23

## 2019-07-23 RX ORDER — SODIUM CHLORIDE, SODIUM LACTATE, POTASSIUM CHLORIDE, CALCIUM CHLORIDE 600; 310; 30; 20 MG/100ML; MG/100ML; MG/100ML; MG/100ML
INJECTION, SOLUTION INTRAVENOUS CONTINUOUS
Status: DISCONTINUED | OUTPATIENT
Start: 2019-07-23 | End: 2019-07-24

## 2019-07-23 RX ORDER — GLYCOPYRROLATE 0.2 MG/ML
INJECTION INTRAMUSCULAR; INTRAVENOUS
Status: DISCONTINUED | OUTPATIENT
Start: 2019-07-23 | End: 2019-07-23

## 2019-07-23 RX ORDER — PROPOFOL 10 MG/ML
VIAL (ML) INTRAVENOUS
Status: DISCONTINUED | OUTPATIENT
Start: 2019-07-23 | End: 2019-07-23

## 2019-07-23 RX ORDER — PHENYLEPHRINE HYDROCHLORIDE 10 MG/ML
INJECTION INTRAVENOUS
Status: DISCONTINUED | OUTPATIENT
Start: 2019-07-23 | End: 2019-07-23

## 2019-07-23 RX ORDER — SODIUM CHLORIDE 9 MG/ML
INJECTION, SOLUTION INTRAVENOUS CONTINUOUS
Status: DISCONTINUED | OUTPATIENT
Start: 2019-07-23 | End: 2019-07-24

## 2019-07-23 RX ORDER — ACETAMINOPHEN 10 MG/ML
1000 INJECTION, SOLUTION INTRAVENOUS ONCE
Status: COMPLETED | OUTPATIENT
Start: 2019-07-23 | End: 2019-07-23

## 2019-07-23 RX ORDER — MIDAZOLAM HYDROCHLORIDE 1 MG/ML
INJECTION, SOLUTION INTRAMUSCULAR; INTRAVENOUS
Status: DISCONTINUED | OUTPATIENT
Start: 2019-07-23 | End: 2019-07-23

## 2019-07-23 RX ORDER — CLINDAMYCIN PHOSPHATE 900 MG/50ML
900 INJECTION, SOLUTION INTRAVENOUS
Status: COMPLETED | OUTPATIENT
Start: 2019-07-23 | End: 2019-07-23

## 2019-07-23 RX ORDER — LIDOCAINE HCL/PF 100 MG/5ML
SYRINGE (ML) INTRAVENOUS
Status: DISCONTINUED | OUTPATIENT
Start: 2019-07-23 | End: 2019-07-23

## 2019-07-23 RX ORDER — LIDOCAINE HYDROCHLORIDE 10 MG/ML
1 INJECTION, SOLUTION EPIDURAL; INFILTRATION; INTRACAUDAL; PERINEURAL ONCE
Status: DISCONTINUED | OUTPATIENT
Start: 2019-07-23 | End: 2019-07-23 | Stop reason: HOSPADM

## 2019-07-23 RX ORDER — OXYCODONE AND ACETAMINOPHEN 5; 325 MG/1; MG/1
1 TABLET ORAL EVERY 4 HOURS PRN
Qty: 30 TABLET | Refills: 0 | Status: SHIPPED | OUTPATIENT
Start: 2019-07-23 | End: 2019-08-22

## 2019-07-23 RX ORDER — MORPHINE SULFATE 10 MG/ML
4 INJECTION INTRAMUSCULAR; INTRAVENOUS; SUBCUTANEOUS
Status: DISCONTINUED | OUTPATIENT
Start: 2019-07-23 | End: 2019-07-23

## 2019-07-23 RX ORDER — FENTANYL CITRATE 50 UG/ML
INJECTION, SOLUTION INTRAMUSCULAR; INTRAVENOUS
Status: DISCONTINUED | OUTPATIENT
Start: 2019-07-23 | End: 2019-07-23

## 2019-07-23 RX ORDER — ROCURONIUM BROMIDE 10 MG/ML
INJECTION, SOLUTION INTRAVENOUS
Status: DISCONTINUED | OUTPATIENT
Start: 2019-07-23 | End: 2019-07-23

## 2019-07-23 RX ORDER — HYDROMORPHONE HYDROCHLORIDE 2 MG/ML
0.2 INJECTION, SOLUTION INTRAMUSCULAR; INTRAVENOUS; SUBCUTANEOUS EVERY 5 MIN PRN
Status: DISCONTINUED | OUTPATIENT
Start: 2019-07-23 | End: 2019-07-23

## 2019-07-23 RX ADMIN — ROCURONIUM BROMIDE 30 MG: 10 INJECTION, SOLUTION INTRAVENOUS at 12:07

## 2019-07-23 RX ADMIN — PROPOFOL 150 MG: 10 INJECTION, EMULSION INTRAVENOUS at 12:07

## 2019-07-23 RX ADMIN — HYDROMORPHONE HYDROCHLORIDE 0.2 MG: 2 INJECTION, SOLUTION INTRAMUSCULAR; INTRAVENOUS; SUBCUTANEOUS at 01:07

## 2019-07-23 RX ADMIN — GLYCOPYRROLATE 0.6 MG: 0.2 INJECTION, SOLUTION INTRAMUSCULAR; INTRAVENOUS at 01:07

## 2019-07-23 RX ADMIN — SODIUM CHLORIDE, SODIUM LACTATE, POTASSIUM CHLORIDE, AND CALCIUM CHLORIDE: .6; .31; .03; .02 INJECTION, SOLUTION INTRAVENOUS at 11:07

## 2019-07-23 RX ADMIN — HYDROMORPHONE HYDROCHLORIDE 0.2 MG: 2 INJECTION, SOLUTION INTRAMUSCULAR; INTRAVENOUS; SUBCUTANEOUS at 02:07

## 2019-07-23 RX ADMIN — ACETAMINOPHEN 1000 MG: 10 INJECTION, SOLUTION INTRAVENOUS at 01:07

## 2019-07-23 RX ADMIN — SUCCINYLCHOLINE CHLORIDE 140 MG: 20 INJECTION, SOLUTION INTRAMUSCULAR; INTRAVENOUS at 12:07

## 2019-07-23 RX ADMIN — SODIUM CHLORIDE, SODIUM LACTATE, POTASSIUM CHLORIDE, AND CALCIUM CHLORIDE: .6; .31; .03; .02 INJECTION, SOLUTION INTRAVENOUS at 07:07

## 2019-07-23 RX ADMIN — PROMETHAZINE HYDROCHLORIDE 12.5 MG: 25 INJECTION INTRAMUSCULAR; INTRAVENOUS at 09:07

## 2019-07-23 RX ADMIN — CLINDAMYCIN PHOSPHATE 600 MG: 18 INJECTION, SOLUTION INTRAVENOUS at 12:07

## 2019-07-23 RX ADMIN — SODIUM CHLORIDE, SODIUM LACTATE, POTASSIUM CHLORIDE, AND CALCIUM CHLORIDE: .6; .31; .03; .02 INJECTION, SOLUTION INTRAVENOUS at 12:07

## 2019-07-23 RX ADMIN — PHENYLEPHRINE HYDROCHLORIDE 100 MCG: 10 INJECTION INTRAVENOUS at 01:07

## 2019-07-23 RX ADMIN — LIDOCAINE HYDROCHLORIDE 100 MG: 20 INJECTION, SOLUTION INTRAVENOUS at 12:07

## 2019-07-23 RX ADMIN — PROMETHAZINE HYDROCHLORIDE 6.25 MG: 25 INJECTION INTRAMUSCULAR; INTRAVENOUS at 02:07

## 2019-07-23 RX ADMIN — FENTANYL CITRATE 25 MCG: 50 INJECTION INTRAMUSCULAR; INTRAVENOUS at 01:07

## 2019-07-23 RX ADMIN — MORPHINE SULFATE 4 MG: 10 INJECTION INTRAMUSCULAR; INTRAVENOUS; SUBCUTANEOUS at 07:07

## 2019-07-23 RX ADMIN — HYDROMORPHONE HYDROCHLORIDE 0.2 MG: 2 INJECTION, SOLUTION INTRAMUSCULAR; INTRAVENOUS; SUBCUTANEOUS at 03:07

## 2019-07-23 RX ADMIN — ONDANSETRON 4 MG: 2 INJECTION, SOLUTION INTRAMUSCULAR; INTRAVENOUS at 12:07

## 2019-07-23 RX ADMIN — PROPOFOL 50 MG: 10 INJECTION, EMULSION INTRAVENOUS at 12:07

## 2019-07-23 RX ADMIN — NEOSTIGMINE METHYLSULFATE 5 MG: 1 INJECTION INTRAVENOUS at 01:07

## 2019-07-23 RX ADMIN — FENTANYL CITRATE 100 MCG: 50 INJECTION INTRAMUSCULAR; INTRAVENOUS at 12:07

## 2019-07-23 RX ADMIN — MIDAZOLAM HYDROCHLORIDE 2 MG: 1 INJECTION, SOLUTION INTRAMUSCULAR; INTRAVENOUS at 12:07

## 2019-07-23 RX ADMIN — ONDANSETRON 4 MG: 2 INJECTION INTRAMUSCULAR; INTRAVENOUS at 02:07

## 2019-07-23 NOTE — TRANSFER OF CARE
"Anesthesia Transfer of Care Note    Patient: Zonia Skaggs    Procedure(s) Performed: Procedure(s) (LRB):  REPAIR, HERNIA, VENTRAL, INCARCERATED, WITHOUT HISTORY OF PRIOR REPAIR (N/A)    Patient location: PACU    Anesthesia Type: general    Transport from OR: Transported from OR on 6-10 L/min O2 by face mask with adequate spontaneous ventilation    Post pain: adequate analgesia    Post assessment: no apparent anesthetic complications    Post vital signs: stable    Level of consciousness: awake and responds to stimulation    Nausea/Vomiting: no nausea/vomiting    Complications: none    Transfer of care protocol was followed      Last vitals:   Visit Vitals  /80 (BP Location: Right arm, Patient Position: Lying)   Pulse 84   Temp 36.3 °C (97.3 °F) (Oral)   Resp 12   Ht 5' 5" (1.651 m)   Wt 112.5 kg (248 lb)   LMP 07/18/2019 (Exact Date)   SpO2 99%   Breastfeeding? No   BMI 41.27 kg/m²     "

## 2019-07-23 NOTE — INTERVAL H&P NOTE
The patient has been examined and the H&P has been reviewed:    I concur with the findings and no changes have occurred since H&P was written.    Anesthesia/Surgery risks, benefits and alternative options discussed and understood by patient/family.          Active Hospital Problems    Diagnosis  POA    Ventral hernia without obstruction or gangrene [K43.9]  Yes      Resolved Hospital Problems   No resolved problems to display.

## 2019-07-23 NOTE — DISCHARGE INSTRUCTIONS
Having Hernia Surgery: Traditional Repair    Surgery treats a hernia by repairing the weakness in the abdominal wall. A cut (incision) is made so the surgeon has a direct view of the hernia. The repair is then done through this incision. This is called open surgery. To fix the problem, muscle and connective tissue may be sewn together to make a traditional repair.  Follow your healthcare providers advice on how to get ready for the procedure. You can usually go home the same day as your surgery. In some cases, though, you may need to stay in the hospital overnight.  Getting ready for surgery  Your healthcare provider will talk with you about preparing for surgery. Follow all the instructions youre given and be sure to:   · Tell your healthcare provider about any medicines, supplements, or herbs you take. This includes both prescription and over-the-counter items.  · Stop taking aspirin, ibuprofen, naproxen, and other NSAIDs as directed.  · Arrange for an adult family member or friend to give you a ride home after surgery.  · Stop smoking. Smoking affects blood flow and can slow healing.  · Gently wash the surgical area the night before surgery.  · Follow any directions you are given for not eating or drinking before surgery.  The day of surgery  Arrive at the hospital or surgical center at your scheduled time. Youll be asked to change into a patient gown. Youll then be given an IV to provide fluids and medicine. Shortly before surgery, an anesthesiologist will talk with you. He or she will explain the types of anesthesia used to prevent pain during surgery. You will have one or more of the following:  · Monitored sedation to make you relaxed and sleepy  · Local anesthesia to numb the surgical site  · Regional anesthesia to numb specific areas of your body  · General anesthesia to let you sleep during surgery  Risks and complications  Hernia surgery is safe, but does have risks  including:  · Bleeding  · Infection  · Anesthesia risks  · Mesh complications  · Inability to urinate   · Numbness or pain in the groin or leg  · Risk the hernia will recur  · Damage to the testicles or testicular function  · Bowel or bladder injury      During the surgery  To make a traditional repair, an incision is made over the hernia. The muscle tissue surrounding the weak area is then sewn together to repair the defect. The incision is closed with stitches, staples, surgical tape, or special glue. This method can be used to repair any type of hernia.  After surgery  When the procedure is over, youll be taken to the recovery area to rest. Your blood pressure and heart rate will be monitored. Youll also have a bandage over the surgical site. To help reduce discomfort, youll be given pain medicines. You may also be given breathing exercises to keep your lungs clear. Later, youll be asked to get up and walk. This helps prevent blood clots in the legs. You can go home when your healthcare provider says youre ready.   Make sure you understand your aftercare instructions, wound care around the incision, physical restrictions, and when to follow up with the surgeon for a postoperative checkup. The most important restriction is no heavy lifting for several weeks following the surgery.  Date Last Reviewed: 7/1/2016  © 5176-4236 [employer]. [address] [place] [address], [place] [id number]. All rights reserved. This information is not intended as a substitute for professional medical care. Always follow your healthcare professional's instructions.      Fall Prevention  Millions of people fall every year and injure themselves. You may have had anesthesia or sedation which may increase your risk of falling. You may have health issues that put you at an increased risk of falling.     Here are ways to reduce your risk of falling.  ·   · Make your home safe by keeping walkways clear of objects you may trip  over.  · Use non-slip pads under rugs. Do not use area rugs or small throw rugs.  · Use non-slip mats in bathtubs and showers.  · Install handrails and lights on staircases.  · Do not walk in poorly lit areas.  · Do not stand on chairs or wobbly ladders.  · Use caution when reaching overhead or looking upward. This position can cause a loss of balance.  · Be sure your shoes fit properly, have non-slip bottoms and are in good condition.   · Wear shoes both inside and out. Avoid going barefoot or wearing slippers.  · Be cautious when going up and down stairs, curbs, and when walking on uneven sidewalks.  · If your balance is poor, consider using a cane or walker.  · If your fall was related to alcohol use, stop or limit alcohol intake.   · If your fall was related to use of sleeping medicines, talk to your doctor about this. You may need to reduce your dosage at bedtime if you awaken during the night to go to the bathroom.    · To reduce the need for nighttime bathroom trips:  ¨ Avoid drinking fluids for several hours before going to bed  ¨ Empty your bladder before going to bed  ¨ Men can keep a urinal at the bedside  · Stay as active as you can. Balance, flexibility, strength, and endurance all come from exercise. They all play a role in preventing falls. Ask your healthcare provider which types of activity are right for you.  · Get your vision checked on a regular basis.  · If you have pets, know where they are before you stand up or walk so you don't trip over them.  Use night lights.

## 2019-07-23 NOTE — ANESTHESIA PREPROCEDURE EVALUATION
07/23/2019  Zonia Skaggs is a 43 y.o., female.    Anesthesia Evaluation    I have reviewed the Patient Summary Reports.    I have reviewed the Nursing Notes.   I have reviewed the Medications.     Review of Systems  Anesthesia Hx:  No problems with previous Anesthesia Denies Hx of Anesthetic complications  Neg history of prior surgery. Denies Family Hx of Anesthesia complications.   Denies Personal Hx of Anesthesia complications.   Social:  Non-Smoker, No Alcohol Use    Hematology/Oncology:  Hematology Normal   Oncology Normal     EENT/Dental:EENT/Dental Normal   Cardiovascular:  Cardiovascular Normal Exercise tolerance: good     Pulmonary:  Pulmonary Normal    Renal/:  Renal/ Normal     Hepatic/GI:   GERD, well controlled    Musculoskeletal:  Musculoskeletal Normal    Neurological:  Neurology Normal    Endocrine:  Endocrine Normal    Dermatological:  Skin Normal    Psych:  Psychiatric Normal  bipolar         Physical Exam  General:  Well nourished, Obesity    Airway/Jaw/Neck:  Airway Findings: Mouth Opening: Normal General Airway Assessment: Adult  Mallampati: II  TM Distance: Normal, at least 6 cm         Dental:  DENTAL FINDINGS: Normal   Chest/Lungs:  Chest/Lungs Clear    Heart/Vascular:  Heart Findings: Normal Heart murmur: negative       Mental Status:  Mental Status Findings:  Cooperative, Alert and Oriented         Anesthesia Plan  Type of Anesthesia, risks & benefits discussed:  Anesthesia Type:  general  Patient's Preference:   Intra-op Monitoring Plan: standard ASA monitors  Intra-op Monitoring Plan Comments:   Post Op Pain Control Plan:   Post Op Pain Control Plan Comments:   Induction:   IV  Beta Blocker:  Patient is not currently on a Beta-Blocker (No further documentation required).       Informed Consent: Patient understands risks and agrees with Anesthesia plan.  Questions answered.  Anesthesia consent signed with patient.  ASA Score: 2     Day of Surgery Review of History & Physical:            Ready For Surgery From Anesthesia Perspective.

## 2019-07-23 NOTE — DISCHARGE SUMMARY
[facility]  Discharge Note  Short Stay    Admit Date: 7/23/2019    Discharge Date and Time: No discharge date for patient encounter.     Attending Physician: Bandar Madden MD     Discharge Provider: Bandar Madden    Diagnoses:  Active Hospital Problems    Diagnosis  POA    Ventral hernia without obstruction or gangrene [K43.9]  Yes      Resolved Hospital Problems   No resolved problems to display.       Discharged Condition: good    Hospital Course: Patient was admitted for an outpatient procedure and tolerated the procedure well with no complications.    Final Diagnoses: Same as principal problem.    Disposition: Home or Self Care    Follow up/Patient Instructions:    Medications:  Reconciled Home Medications:      Medication List      CONTINUE taking these medications    cetirizine 10 MG tablet  Commonly known as:  ZYRTEC  Take 10 mg by mouth once daily.     docusate sodium 100 MG capsule  Commonly known as:  COLACE  Take 100 mg by mouth once daily.     ferrous sulfate 325 (65 FE) MG EC tablet  Take 325 mg by mouth 3 (three) times daily with meals.     ibuprofen 200 MG tablet  Commonly known as:  ADVIL,MOTRIN  Take 200 mg by mouth every 6 (six) hours as needed for Pain.     multivitamin with minerals tablet  Take 1 tablet by mouth once daily.     PriLOSEC OTC 20 MG tablet  Generic drug:  omeprazole  Take 1 tablet by mouth Daily.          No discharge procedures on file.      Discharge Procedure Orders (must include Diet, Follow-up, Activity):  No discharge procedures on file.

## 2019-07-23 NOTE — OP NOTE
[facility]  General Surgery  Operative Note    SUMMARY     Date of Procedure: 7/23/2019     Procedure: Procedure(s) (LRB):  REPAIR, HERNIA, VENTRAL, INCARCERATED, WITHOUT HISTORY OF PRIOR REPAIR (N/A)       Surgeon(s) and Role:     * Bandar Madden MD - Primary    Assisting Surgeon: None    Pre-Operative Diagnosis: Ventral hernia without obstruction or gangrene [K43.9]    Post-Operative Diagnosis: Post-Op Diagnosis Codes:     * Ventral hernia without obstruction or gangrene [K43.9]    Anesthesia: General    Indications for Procedure:  43-year-old woman with a history of incarcerated ventral hernia    Procedure in Detail:  After informed consent was obtained, the patient was taken operating room and placed supine on the operating room table.  After a pre procedure pause was performed to correctly identify the procedure be performed and the patient name, patient was prepped and draped standard surgical fashion.    A vertical midline incision was made from the umbilicus to longterm point between the umbilicus in the subxiphoid.  This incision was carried down to the hernia sac.  The hernia sac was dissected using electrocautery circumferentially.  All adhesions to the fascial edge were dissected using combination of electrocautery and sharp dissection.  The hernia sac was found to have incarcerated omentum.  These adhesions were taken down using electrocautery as well as ligation of appropriate vessels. Portion of the omentum was resected to make reduction of the hernia easier.  After the fascial edges were cleared from remaining attachments,  a piece of polypropylene mesh with anemia things of [place] was placed into the peritoneal cavity. It was sutured circumferentially with interrupted 0 PDS sutures. Fascia was then reapproximated over the mesh using a running 1.  Vicryl suture. The dissection was inspected for hemostasis which was evident.  Skin edges were then reapproximated using a 3 0 Vicryl  running suture followed by 4 0 Vicryl subcuticular stitch. Steri-Strips and sterile dressing were applied.    Patient tolerated the procedure well transported recovery room in good condition    Significant Surgical Tasks Conducted by the Assistant(s), if Applicable:  None    Estimated Blood Loss (EBL):  Minimal           Implants:   Implant Name Type Inv. Item Serial No.  Lot No. LRB No. Used   PATCH HERNIA VENTRIO ST. - [id number]  PATCH HERNIA VENTRIO ST.  [employer] [id number] N/A 1       Specimens:   Specimen (12h ago, onward)    None                  Condition: Good    Disposition: PACU - hemodynamically stable.    Attestation: I was present and scrubbed for the entire procedure.

## 2019-07-24 VITALS
DIASTOLIC BLOOD PRESSURE: 84 MMHG | HEIGHT: 65 IN | WEIGHT: 248 LBS | BODY MASS INDEX: 41.32 KG/M2 | OXYGEN SATURATION: 98 % | RESPIRATION RATE: 18 BRPM | HEART RATE: 107 BPM | SYSTOLIC BLOOD PRESSURE: 173 MMHG | TEMPERATURE: 98 F

## 2019-07-24 LAB — POCT GLUCOSE: 79 MG/DL (ref 70–110)

## 2019-07-24 PROCEDURE — 25000003 PHARM REV CODE 250: Performed by: SURGERY

## 2019-07-24 PROCEDURE — 63600175 PHARM REV CODE 636 W HCPCS: Performed by: SURGERY

## 2019-07-24 RX ORDER — HYDROCODONE BITARTRATE AND ACETAMINOPHEN 5; 325 MG/1; MG/1
1 TABLET ORAL EVERY 6 HOURS PRN
Status: DISCONTINUED | OUTPATIENT
Start: 2019-07-24 | End: 2019-07-24

## 2019-07-24 RX ORDER — OXYCODONE AND ACETAMINOPHEN 5; 325 MG/1; MG/1
1 TABLET ORAL EVERY 6 HOURS PRN
Status: DISCONTINUED | OUTPATIENT
Start: 2019-07-24 | End: 2019-07-24 | Stop reason: HOSPADM

## 2019-07-24 RX ADMIN — OXYCODONE HYDROCHLORIDE AND ACETAMINOPHEN 1 TABLET: 5; 325 TABLET ORAL at 12:07

## 2019-07-24 RX ADMIN — HYDROMORPHONE HYDROCHLORIDE 1 MG: 2 INJECTION, SOLUTION INTRAMUSCULAR; INTRAVENOUS; SUBCUTANEOUS at 05:07

## 2019-07-24 RX ADMIN — PROMETHAZINE HYDROCHLORIDE 12.5 MG: 25 INJECTION INTRAMUSCULAR; INTRAVENOUS at 07:07

## 2019-07-24 RX ADMIN — SODIUM CHLORIDE, SODIUM LACTATE, POTASSIUM CHLORIDE, AND CALCIUM CHLORIDE: .6; .31; .03; .02 INJECTION, SOLUTION INTRAVENOUS at 05:07

## 2019-07-24 NOTE — PROGRESS NOTES
Follow-up Information     Bandar Madden MD. Schedule an appointment as soon as possible for a visit on 7/31/2019.    Specialties:  General Surgery, Surgery  Why:  Outpatient Services Surgery Follow-Up Wednesday at 10:00 AM  Contact information:  [address] [place] [address]  [phone]               PLEASE BRING TO ALL FOLLOW UP APPOINTMENTS:   1) A COPY YOUR DISCHARGE INSTRUCTIONS   2) ALL MEDICINES YOU ARE CURRENTLY TAKING IN THEIR ORIGINAL BOTTLES   3) IDENTIFICATION CARD   4) INSURANCE CARD    **PLEASE ARRIVE 15 MINUTES AHEAD OF SCHEDULED APPOINTMENT TIME   ++PLEASE CALL 24 HOURS IN ADVANCE IF YOU MUST RESCHEDULE YOUR APPOINTMENT DAY AND/OR TIME     [facility] CARE MANAGEMENT WRITTEN DISCHARGE INFORMATION    APPOINTMENTS AND RESOURCES TO HELP YOU MANAGE YOUR CARE AT HOME BASED ON YOUR PREFERENCES:  (If an appointment is not scheduled for you when you leave the hospital, call your doctor to schedule a follow up visit within a week)        Healthy Living Instructions to HELP MANAGE YOUR CARE AT HOME:  Things You are responsible for:  1.    Getting your prescriptions filled   2.    Taking your medications as directed, DO NOT MISS ANY DOSES!  3.    Following the diet and exercise recommended by your doctor  4.    Going to your follow-up doctor appointment. This is important because it allows the doctor to monitor your progress and determine if any changes need to made to your treatment plan.  5. If you have any questions about MANAGING YOUR CARE AT HOME Call the Nurse Care Line for 24/7 Assistance [phone]       Please answer any calls you may receive from [facility]. We want to continue to support you as you manage your healthcare needs. [facility] is happy to have the opportunity to serve you.      Thank you for choosing [facility] for your healthcare needs!  Your [facility] Case Management Team,    Elma Coppola RN [place]  Registered Nurse Transition Navigator  [phone]  [phone]

## 2019-07-24 NOTE — ANESTHESIA POSTPROCEDURE EVALUATION
Anesthesia Post Evaluation    Patient: Zonia Skaggs    Procedure(s) Performed: Procedure(s) (LRB):  REPAIR, HERNIA, VENTRAL, INCARCERATED, WITHOUT HISTORY OF PRIOR REPAIR (N/A)    Final Anesthesia Type: general  Patient location during evaluation: PACU  Patient participation: Yes- Able to Participate  Level of consciousness: awake and alert, oriented and awake  Post-procedure vital signs: reviewed and stable  Airway patency: patent  PONV status at discharge: No PONV  Anesthetic complications: no      Cardiovascular status: blood pressure returned to baseline  Respiratory status: unassisted, spontaneous ventilation and room air  Hydration status: euvolemic  Follow-up not needed.          Vitals Value Taken Time   /84 7/24/2019  8:00 AM   Temp 36.8 °C (98.2 °F) 7/24/2019  8:00 AM   Pulse 107 7/24/2019  8:00 AM   Resp 18 7/24/2019  8:00 AM   SpO2 98 % 7/24/2019  8:00 AM         Event Time     Out of Recovery 15:39:00          Pain/Jovita Score: Pain Rating Prior to Med Admin: 8 (7/24/2019 12:09 PM)  Pain Rating Post Med Admin: 3 (7/23/2019  8:08 PM)  Jovita Score: 9 (7/23/2019  3:40 PM)

## 2019-07-24 NOTE — NURSING
"Pt c/o pain, nausea, dry heaving; 0 orders noted for antiemetic; Morphine administered per orders; pt immediately c/o "not feeling well with this pain medications" stated that it made her sick and anxious; confirmed that she never had morphine before; call to provider on call; spoke with Dr Woodruff; new orders given; WC  "

## 2019-07-24 NOTE — PLAN OF CARE
07/24/19 1444   Discharge Assessment   Assessment Type Discharge Planning Assessment   Confirmed/corrected address and phone number on facesheet? Yes   Assessment information obtained from? Patient   Communicated expected length of stay with patient/caregiver yes   Prior to hospitilization cognitive status: Alert/Oriented   Prior to hospitalization functional status: Independent   Current cognitive status: Alert/Oriented   Current Functional Status: Independent   Lives With spouse   Able to Return to Prior Arrangements yes   Is patient able to care for self after discharge? Yes   Who are your caregiver(s) and their phone number(s)?   (Spouse)   Patient's perception of discharge disposition home or selfcare   Readmission Within the Last 30 Days no previous admission in last 30 days   Patient currently being followed by outpatient case management? No   Patient currently receives any other outside agency services? No   Equipment Currently Used at Home none   Do you have any problems affording any of your prescribed medications? No   Is the patient taking medications as prescribed? yes   Does the patient have transportation home? Yes   Transportation Anticipated family or friend will provide   Does the patient receive services at the [facility]mad[facility] Clinic? No   Discharge Plan A Home with family   DME Needed Upon Discharge  none   Patient/Family in Agreement with Plan yes

## 2019-07-24 NOTE — PROGRESS NOTES
HOW TO MANAGE YOUR CARE  AT HOME:  TN taught Symptoms and Problems for POST OP home care with pt and SPOUSE/ALBINA  with teach back:  1. FEVER 100.5 OR HIGHER, 2.FOUL SMELLING DRAINAGE,3. PAIN, SWELLING IN LEGS=DON'T RUB, 4. CONSTIPATION. TN placed education sheet in [employer] Packet..     TN taught patient about things she is responsible for when discharged TO HELP WITH HER RECOVERY:  How to Manage her Care At Home:  1. Getting her prescriptions filled.  2. Taking her medications as directed. DO NOT MISS ANY DOSES!  3. Going to her follow-up doctor appointments.   .  Louann Mullins RN, BSN, STN CCM

## 2019-07-24 NOTE — PLAN OF CARE
"   07/24/19 1447   Final Note   Assessment Type Final Discharge Note   Anticipated Discharge Disposition Home   What phone number can be called within the next 1-3 days to see how you are doing after discharge?   (Listed in chart)   Hospital Follow Up  Appt(s) scheduled? Yes   Discharge plans and expectations educations in teach back method with documentation complete? Yes   Right Care Referral Info   Post Acute Recommendation No Care     TN reviewed follow up appointment information as well as  "Post op discharge instructions" handout with patient using teach back while informing patient to concentrate on signs and symptoms to look for after discharge that would flag her that she needs to contact the doctor. Patient is in agreement and verbalized an understanding. Placed discharge information in blue discharge folder.  TN also reviewed patient responsibility checklist with her using teach back. Patient was able to verbalize her responsibilities after discharge to manager her care at home being   1. Going to follow up appointments   2.  rx from the pharmacy when discharged  3. Taking her medication as prescribed     Patient's nurse, Zee, informed that patient can discharge from  standpoint. Nurse can now complete discharge and review signs and symptoms teaching.   "

## 2019-07-24 NOTE — NURSING
Pt given written and verbal discharge orders. Pt and spouse verbalized understanding. Narcotic script in hand and educated on use. NAD

## 2019-07-24 NOTE — PLAN OF CARE
07/24/19 1444   Post-Acute Status   Post-Acute Authorization Placement  (home)   Post-Acute Placement Status Set-up Complete

## 2019-07-29 ENCOUNTER — TELEPHONE (OUTPATIENT)
Dept: SURGERY | Facility: CLINIC | Age: 44
End: 2019-07-29

## 2019-07-29 NOTE — TELEPHONE ENCOUNTER
Pt called c/o nausea, she is able to keep food down.   notified.  Zofran 8mg ODT po q 4-6 hrs prn nausea #30 called to [facility] [phone].  Pt advised      ----- Message from Keke Azevedo sent at 7/29/2019 10:31 AM CDT -----  Contact: Self: [phone]  Type:  Needs Medical Advice    Who Called: Self    Symptoms (please be specific): Nauseous     How long has patient had these symptoms:  Since Thurs    Pharmacy name and phone #:  ..    [facility]/pharmacy #[id number] - [place], [place] - [address]  [address]  [place] [address]  Phone: [phone] Fax: [phone]        Would the patient rather a call back or a response via [facility]? Callback    Best Call Back Number: [phone]    Additional Information: N/A

## 2019-07-31 ENCOUNTER — OFFICE VISIT (OUTPATIENT)
Dept: SURGERY | Facility: CLINIC | Age: 44
End: 2019-07-31
Payer: COMMERCIAL

## 2019-07-31 VITALS
DIASTOLIC BLOOD PRESSURE: 85 MMHG | SYSTOLIC BLOOD PRESSURE: 127 MMHG | HEIGHT: 65 IN | BODY MASS INDEX: 40.48 KG/M2 | WEIGHT: 243 LBS | HEART RATE: 97 BPM

## 2019-07-31 DIAGNOSIS — K43.2 INCISIONAL HERNIA, WITHOUT OBSTRUCTION OR GANGRENE: Primary | ICD-10-CM

## 2019-07-31 PROCEDURE — 99024 POSTOP FOLLOW-UP VISIT: CPT | Mod: S$GLB,,, | Performed by: SURGERY

## 2019-07-31 PROCEDURE — 99999 PR PBB SHADOW E&M-EST. PATIENT-LVL III: ICD-10-PCS | Mod: PBBFAC,,, | Performed by: SURGERY

## 2019-07-31 PROCEDURE — 99024 PR POST-OP FOLLOW-UP VISIT: ICD-10-PCS | Mod: S$GLB,,, | Performed by: SURGERY

## 2019-07-31 PROCEDURE — 99999 PR PBB SHADOW E&M-EST. PATIENT-LVL III: CPT | Mod: PBBFAC,,, | Performed by: SURGERY

## 2019-07-31 NOTE — PROGRESS NOTES
"Zonia Skaggs is a 43 y.o. female patient.   1. Incisional hernia, without obstruction or gangrene      Past Medical History:   Diagnosis Date    Bipolar 1 disorder     GERD (gastroesophageal reflux disease)     History of psychiatric hospitalization     Hx of psychiatric care     Psychiatric problem     Seasonal allergies     Therapy     used to follow with Dr. Gerber Rogers    Vaginal delivery     x5     Past Surgical History Pertinent Negatives:   Procedure Date Noted    HYSTERECTOMY 06/20/2019     Scheduled Meds:  Continuous Infusions:  PRN Meds:    Review of patient's allergies indicates:   Allergen Reactions    Penicillins Hives and Shortness Of Breath    Morphine      There are no hospital problems to display for this patient.    Blood pressure 127/85, pulse 97, height 5' 5" (1.651 m), weight 110.2 kg (243 lb), last menstrual period 07/18/2019.    Subjective:   Diet: Adequate intake.  Patient reports nausea.  Patient reports no vomiting.    Activity level: Returning to normal.    Pain control: Partially controlled.      Objective:  Vital signs (most recent): Blood pressure 127/85, pulse 97, height 5' 5" (1.651 m), weight 110.2 kg (243 lb), last menstrual period 07/18/2019.  General appearance: Comfortable.    Wound:  Clean.  There is no hernia.  There is no drainage.       Assessment:   Condition: In stable condition.       Gradually resume normal activity, gradually resume normal diet.  Follow up PRN       Bandar Madden MD  7/31/2019  "

## 2019-07-31 NOTE — PATIENT INSTRUCTIONS
Wound Check After Surgery, No Complication  Surgery involves cutting through layers of skin, fatty tissue, muscle, and sometimes bone and cartilage. Sutures (stitches) or staples are used to close all layers of the wound. The sutures on the inside will dissolve in about 2 to 3 weeks. Any sutures or staples used on the outside need to be removed in about 7 to 14 days, depending on the location.  It is normal to have some clear or bloody discharge on the wound covering or bandage (dressing) for the first few days after surgery. If your wound was sutured (sewn) closed, you should not have to change the dressing more than twice a day in the first few days. Bleeding or discharge requiring more frequent dressing changes can be a sign of a problem.  It is normal to feel pain at the incision site. The pain decreases as the wound heals. Most of the pain and soreness from the skin incision should go away by the time the sutures or staples are removed. Soreness and pain from deeper tissues may last another week or two.  Pain that continues more than a few weeks after surgery or pain that worsens anytime after surgery can be a sign of a problem, such as:  · Infection  · Separation of wound edges  · Collection of blood or other below the skin  Home care  Different types of surgery require different types of care and dressing changes. It is important to follow all instructions and advice from your surgeon, as well as other members of your healthcare team.  Wound care  · Keep the wound clean, as directed by your healthcare provider.  · Change the dressing as directed. Change the dressing sooner if it becomes wet or stained with blood or fluid from the wound.  · Bathe with a sponge (no shower or tub baths) for the first few days after surgery, or until there is no more drainage from the wound. Unless you received different instructions from your surgeon, you can then shower. Do not soak the area in water (no baths or swimming)  until the tape, sutures, or staples are removed and any wound opening has dried out and healed.  Changing the dressing  · Wash your hands before changing the dressings.  · Carefully remove the dressing and tape; dont just yank it off. If it sticks to the wound, you may need to wet it a little to remove it, unless your healthcare provider told you not to wet it.  · Wash your hands again before putting on a new, clean dressing.  · Gently clean the wound with clean water (or saline) using gauze or a clean washcloth. Do not rub it or pick at it.  · Do not use soap, alcohol, hydrogen peroxide, or any other cleanser.  · If you were told to dry the wound before putting on a new dressing, gently pat it dry. Do not rub.  · Throw out the old dressing. Do not reuse it!  · Wash your hands again when you are done.  Types of dressings  Your healthcare team will tell you what type of dressing to put on your wound. Follow your healthcare teams instructions carefully, and contact them if you have any questions. Two common types of dressings are described below. You may have one of these or another type.  · Dry dressing. Use dry gauze. If the wound is still draining, use a nonadherent dressing, which shouldnt stick to the wound.  · Wet-to-dry dressing. Wet the gauze, and squeeze out the excess water (or saline), before putting it on. Then, cover this with a dry pad.  Medicines  · If you were given antibiotics, take them until they are used up or your healthcare provider tells you to stop. It is important to finish the antibiotics even though you feel better, to make sure the infection has cleared.  · You can take acetaminophen or ibuprofen for pain, unless you were given a different pain medicine to use. (Note: If you have chronic liver or kidney disease, or have ever had a stomach ulcer or gastrointestinal bleeding, or are taking blood thinner medicines, talk with your healthcare provider before using these  medicines.)  · Aspirin should never be used in anyone under 18 years of age who is ill with a fever. It may cause severe liver damage.  Follow-up care  Follow up with your healthcare provider, or as advised, for your next wound check or removal of your sutures, staples, or tape.  · If a culture was done, you will be notified if the results will affect your treatment. You can call as directed for the results.  · If imaging tests, such as X-rays, an ultrasound, or CT scan were done, they will be reviewed by a specialist. You will be notified of the results, especially if they affect treatment.  Call 911  Call emergency services right away if any of these occur:  · Trouble breathing or swallowing, wheezing  · Hoarse voice or trouble speaking  · Extreme confusion  · Extreme drowsiness or trouble awakening  · Fainting or loss of consciousness  · Rapid heart rate or very slow heart rate  · Vomiting blood, or large amounts of blood in stool  · Discomfort in the center of the chest that feels like pressure, squeezing, a sense of fullness, or pain.  · Discomfort or pain in other upper body areas, such as the back, one or both arms, neck, jaw, or stomach  · Stroke symptoms (spot a stroke FAST)  ¨ F: Face drooping. One side of the face is numb or droops.  ¨ A: Arm weakness. One arm feels weak or numb.  ¨ S: Speech difficulty: Speech is slurred, or the person is unable to speak.  ¨ T: Time to call 911. Even if symptoms go away, call 911.  When to seek medical advice  Call your healthcare provider right away if any of the following occur:  · Increasing pain at the site of surgery  · Fever over 100.4º F (38º C)  · Redness around the wound  · Fluid, pus, or blood draining from the wound  · Vomiting, constipation, or diarrhea  Date Last Reviewed: 9/27/2015  © 3448-2340 [employer]. [address] [place] [address], [place] [address]. All rights reserved. This information is not intended as a substitute for professional  medical care. Always follow your healthcare professional's instructions.

## 2019-09-06 ENCOUNTER — OFFICE VISIT (OUTPATIENT)
Dept: FAMILY MEDICINE | Facility: CLINIC | Age: 44
End: 2019-09-06
Payer: COMMERCIAL

## 2019-09-06 VITALS
OXYGEN SATURATION: 97 % | TEMPERATURE: 99 F | BODY MASS INDEX: 40.3 KG/M2 | DIASTOLIC BLOOD PRESSURE: 72 MMHG | SYSTOLIC BLOOD PRESSURE: 140 MMHG | WEIGHT: 241.88 LBS | HEART RATE: 103 BPM | HEIGHT: 65 IN

## 2019-09-06 DIAGNOSIS — M54.42 ACUTE LEFT-SIDED LOW BACK PAIN WITH LEFT-SIDED SCIATICA: Primary | ICD-10-CM

## 2019-09-06 DIAGNOSIS — J30.9 ALLERGIC RHINITIS, UNSPECIFIED SEASONALITY, UNSPECIFIED TRIGGER: ICD-10-CM

## 2019-09-06 PROCEDURE — 96372 PR INJECTION,THERAP/PROPH/DIAG2ST, IM OR SUBCUT: ICD-10-PCS | Mod: S$GLB,,, | Performed by: NURSE PRACTITIONER

## 2019-09-06 PROCEDURE — 3008F BODY MASS INDEX DOCD: CPT | Mod: CPTII,S$GLB,, | Performed by: NURSE PRACTITIONER

## 2019-09-06 PROCEDURE — 99999 PR PBB SHADOW E&M-EST. PATIENT-LVL IV: CPT | Mod: PBBFAC,,, | Performed by: NURSE PRACTITIONER

## 2019-09-06 PROCEDURE — 96372 THER/PROPH/DIAG INJ SC/IM: CPT | Mod: S$GLB,,, | Performed by: NURSE PRACTITIONER

## 2019-09-06 PROCEDURE — 3008F PR BODY MASS INDEX (BMI) DOCUMENTED: ICD-10-PCS | Mod: CPTII,S$GLB,, | Performed by: NURSE PRACTITIONER

## 2019-09-06 PROCEDURE — 99214 OFFICE O/P EST MOD 30 MIN: CPT | Mod: 25,S$GLB,, | Performed by: NURSE PRACTITIONER

## 2019-09-06 PROCEDURE — 99999 PR PBB SHADOW E&M-EST. PATIENT-LVL IV: ICD-10-PCS | Mod: PBBFAC,,, | Performed by: NURSE PRACTITIONER

## 2019-09-06 PROCEDURE — 99214 PR OFFICE/OUTPT VISIT, EST, LEVL IV, 30-39 MIN: ICD-10-PCS | Mod: 25,S$GLB,, | Performed by: NURSE PRACTITIONER

## 2019-09-06 RX ORDER — ONDANSETRON 8 MG/1
TABLET, ORALLY DISINTEGRATING ORAL
Refills: 0 | Status: ON HOLD | COMMUNITY
Start: 2019-07-29 | End: 2019-11-26 | Stop reason: HOSPADM

## 2019-09-06 RX ORDER — LEVOCETIRIZINE DIHYDROCHLORIDE 5 MG/1
5 TABLET, FILM COATED ORAL NIGHTLY
Qty: 30 TABLET | Refills: 11 | Status: ON HOLD | OUTPATIENT
Start: 2019-09-06 | End: 2019-11-26 | Stop reason: HOSPADM

## 2019-09-06 RX ORDER — CYCLOBENZAPRINE HCL 10 MG
10 TABLET ORAL 3 TIMES DAILY PRN
Qty: 30 TABLET | Refills: 0 | Status: SHIPPED | OUTPATIENT
Start: 2019-09-06 | End: 2019-09-18 | Stop reason: SDUPTHER

## 2019-09-06 RX ORDER — ACETAMINOPHEN AND CODEINE PHOSPHATE 300; 30 MG/1; MG/1
1 TABLET ORAL EVERY 4 HOURS PRN
Qty: 30 TABLET | Refills: 0 | Status: SHIPPED | OUTPATIENT
Start: 2019-09-06 | End: 2019-09-16

## 2019-09-06 RX ORDER — KETOROLAC TROMETHAMINE 30 MG/ML
60 INJECTION, SOLUTION INTRAMUSCULAR; INTRAVENOUS
Status: COMPLETED | OUTPATIENT
Start: 2019-09-06 | End: 2019-09-06

## 2019-09-06 RX ORDER — METHYLPREDNISOLONE 4 MG/1
TABLET ORAL
Qty: 1 PACKAGE | Refills: 0 | Status: SHIPPED | OUTPATIENT
Start: 2019-09-06 | End: 2019-09-18 | Stop reason: SDUPTHER

## 2019-09-06 RX ORDER — IBUPROFEN 800 MG/1
800 TABLET ORAL 3 TIMES DAILY
Qty: 90 TABLET | Refills: 0 | Status: CANCELLED | OUTPATIENT
Start: 2019-09-06

## 2019-09-06 RX ADMIN — KETOROLAC TROMETHAMINE 60 MG: 30 INJECTION, SOLUTION INTRAMUSCULAR; INTRAVENOUS at 11:09

## 2019-09-06 NOTE — PROGRESS NOTES
Not doing maintenance today.  Answers for HPI/ROS submitted by the patient on 9/6/2019   Back pain  Chronicity: new  Onset: in the past 7 days  Frequency: constantly  Progression since onset: rapidly worsening  Pain location: gluteal, sacro-iliac  Pain quality: aching, burning, cramping, shooting  Radiates to: left foot, left thigh  Pain is: the same all the time  Aggravated by: bending, position, standing, urinating  Stiffness is present: all day  abdominal pain: Yes  bladder incontinence: Yes  bowel incontinence: Yes  chest pain: No  dysuria: No  fever: No  headaches: Yes  leg pain: Yes  numbness: Yes  paresis: No  paresthesias: Yes  pelvic pain: Yes  perianal numbness: No  tingling: Yes  weakness: Yes  weight loss: No  genital pain: No  hematuria: No  Pain severity: severe  Improvement on treatment: no relief

## 2019-09-06 NOTE — PROGRESS NOTES
Subjective:       Patient ID: Zonia Skaggs is a 43 y.o. female.    Chief Complaint: Leg Pain    Leg Pain    The incident occurred more than 1 week ago. The incident occurred at home. There was no injury mechanism. The pain is present in the right thigh, right toes, right foot, right heel and right knee. The quality of the pain is described as aching and shooting. The pain is at a severity of 10/10. The pain is moderate. The pain has been constant since onset. Associated symptoms include numbness and tingling. Pertinent negatives include no inability to bear weight, loss of motion, loss of sensation or muscle weakness. She reports no foreign bodies present. The symptoms are aggravated by movement, palpation and weight bearing.   Back Pain   This is a new problem. The current episode started in the past 7 days. The problem occurs constantly. The problem has been rapidly worsening since onset. The pain is present in the gluteal and sacro-iliac. The quality of the pain is described as aching, burning, cramping and shooting. The pain radiates to the left foot and left thigh. The pain is at a severity of 9/10. The pain is severe. The pain is the same all the time. The symptoms are aggravated by bending, position, standing and urinating. Stiffness is present all day. Associated symptoms include abdominal pain, headaches, leg pain, numbness, paresthesias, pelvic pain and tingling. Pertinent negatives include no bladder incontinence, bowel incontinence, chest pain, dysuria, fever, paresis, perianal numbness, weakness or weight loss. The treatment provided no relief.     Review of Systems   Constitutional: Negative for chills, diaphoresis, fatigue, fever and weight loss.   HENT: Positive for postnasal drip.    Respiratory: Negative for chest tightness, shortness of breath and wheezing.    Cardiovascular: Negative for chest pain.   Gastrointestinal: Positive for abdominal pain. Negative for bowel incontinence.    Genitourinary: Positive for pelvic pain. Negative for bladder incontinence, dysuria, flank pain, frequency, genital sores and hematuria.   Musculoskeletal: Positive for arthralgias and back pain. Negative for myalgias and neck pain.   Neurological: Positive for tingling, numbness, headaches and paresthesias. Negative for weakness.       Objective:      Physical Exam   Constitutional: She is oriented to person, place, and time. Vital signs are normal. She appears well-developed and well-nourished.   HENT:   Head: Normocephalic and atraumatic.   Right Ear: External ear normal.   Left Ear: External ear normal.   Nose: Nose normal.   Mouth/Throat: Oropharynx is clear and moist. No oropharyngeal exudate.   Cardiovascular: Normal rate, regular rhythm and normal heart sounds.   Pulmonary/Chest: Effort normal and breath sounds normal.   Musculoskeletal:        Lumbar back: She exhibits decreased range of motion, tenderness and pain. She exhibits no bony tenderness, no swelling, no edema, no deformity, no laceration, no spasm and normal pulse.        Back:    Positive Straight leg raises pain elicited on left side    Neurological: She is alert and oriented to person, place, and time.   Skin: Skin is warm, dry and intact. Capillary refill takes less than 2 seconds.   Psychiatric: She has a normal mood and affect.     Answers for HPI/ROS submitted by the patient on 9/6/2019   Back pain  genital pain: No     Assessment:       1. Acute left-sided low back pain with left-sided sciatica    2. Allergic rhinitis, unspecified seasonality, unspecified trigger        Plan:       Zonia was seen today for leg pain.    Diagnoses and all orders for this visit:    Acute left-sided low back pain with left-sided sciatica  -     ketorolac injection 60 mg  -     cyclobenzaprine (FLEXERIL) 10 MG tablet; Take 1 tablet (10 mg total) by mouth 3 (three) times daily as needed.  -     acetaminophen-codeine 300-30mg (TYLENOL #3) 300-30 mg Tab; Take 1  tablet by mouth every 4 (four) hours as needed.  -     methylPREDNISolone (MEDROL DOSEPACK) 4 mg tablet; use as directed  Home care  Follow these tips when caring for yourself at home:  · You may need to stay in bed the first few days. But as soon as possible, begin sitting up or walking. This will help you avoid problems that come from staying in bed for long periods.  · When in bed, try to find a position that is comfortable. A firm mattress is best. Try lying flat on your back with pillows under your knees. You can also try lying on your side with your knees bent up toward your chest and a pillow between your knees.  · Avoid sitting for long periods. This puts more stress on your lower back than standing or walking.  · Use heat from a hot shower, hot bath, or heating pad to help ease pain. Massage can also help. You can also try using an ice pack. You can make your own ice pack by putting ice cubes in a plastic bag. Wrap the bag in a thin towel. Try both heat and cold to see which works best. Use the method that feels best for 20 minutes several times a day.  · You may use acetaminophen or ibuprofen to ease pain, unless another pain medicine was prescribed. Note: If you have chronic liver or kidney disease, talk with your healthcare provider before taking these medicines. Also talk with your provider if youve had a stomach ulcer or gastrointestinal bleeding.  · Use safe lifting methods. Dont lift anything heavier than 15 pounds until all of the pain is gone.  Follow-up care  Follow up with your healthcare provider, or as advised. You may need physical therapy or additional tests.  If X-rays were taken, a radiologist will look at them. You will be told of any new findings that may affect your care.  When to seek medical advice  Call your healthcare provider right away if any of these occur:  · Pain gets worse even after taking prescribed medicine  · Weakness or numbness in 1 or both legs or hips  · Numbness in  your groin or genital area  · You cant control your bowel or bladder  · Fever  · Redness or swelling over your back or spine   Date Last Reviewed: 8/1/2016 © 2000-2017 [employer]. [address] [place] [address], [place] [address]. All rights reserved. This information is not intended as a substitute for professional medical care. Always follow your healthcare professional's instructions.          Allergic rhinitis, unspecified seasonality, unspecified trigger  -     levocetirizine (XYZAL) 5 MG tablet; Take 1 tablet (5 mg total) by mouth every evening.

## 2019-09-06 NOTE — PATIENT INSTRUCTIONS

## 2019-09-18 ENCOUNTER — TELEPHONE (OUTPATIENT)
Dept: FAMILY MEDICINE | Facility: CLINIC | Age: 44
End: 2019-09-18

## 2019-09-18 DIAGNOSIS — M54.42 ACUTE LEFT-SIDED LOW BACK PAIN WITH LEFT-SIDED SCIATICA: ICD-10-CM

## 2019-09-18 RX ORDER — METHYLPREDNISOLONE 4 MG/1
TABLET ORAL
Qty: 1 PACKAGE | Refills: 0 | Status: ON HOLD | OUTPATIENT
Start: 2019-09-18 | End: 2019-11-26 | Stop reason: HOSPADM

## 2019-09-18 NOTE — TELEPHONE ENCOUNTER
Were you intending to refill these? At this point my recommendation is PT and continue the flexeril    Snehal Reynoso MD

## 2019-09-18 NOTE — TELEPHONE ENCOUNTER
----- Message from Nirmala Wiseman sent at 9/18/2019  1:35 PM CDT -----  Contact: Self/  [phone]  Type: RX Refill Request    Who Called:   Patient    Refill or New Rx:  Refill    RX Name and Strength:  cyclobenzaprine (FLEXERIL) 10 MG tablet                                           acetaminophen-codeine 300-30mg (TYLENOL #3) 300-30 mg Tab    Preferred Pharmacy with phone number:  [facility]ACY #[id number] - [place] - [address]    Local or Mail Order:  Local    Ordering Provider:  STEFANO Genao    Would the patient rather a call back or a response via [facility]?  Call back    Best Call Back Number:  [phone]

## 2019-09-18 NOTE — TELEPHONE ENCOUNTER
Patient is requesting to have the Rx listed below filled at the [facility] in [place].     Says she just needs a few days worth as she has an appointment with Dr. Reynoso 09/21/2019 at 11:40. Patient states that she is still in pain and only have 2 pills.    Thanks,  Neris

## 2019-09-19 RX ORDER — ACETAMINOPHEN AND CODEINE PHOSPHATE 300; 30 MG/1; MG/1
1 TABLET ORAL EVERY 4 HOURS PRN
Qty: 30 TABLET | Refills: 0 | OUTPATIENT
Start: 2019-09-19 | End: 2019-09-29

## 2019-09-19 RX ORDER — CYCLOBENZAPRINE HCL 10 MG
10 TABLET ORAL 3 TIMES DAILY PRN
Qty: 30 TABLET | Refills: 0 | Status: SHIPPED | OUTPATIENT
Start: 2019-09-19 | End: 2019-09-21 | Stop reason: SDUPTHER

## 2019-09-21 ENCOUNTER — OFFICE VISIT (OUTPATIENT)
Dept: FAMILY MEDICINE | Facility: CLINIC | Age: 44
End: 2019-09-21
Payer: COMMERCIAL

## 2019-09-21 VITALS
DIASTOLIC BLOOD PRESSURE: 84 MMHG | HEART RATE: 104 BPM | RESPIRATION RATE: 16 BRPM | WEIGHT: 242.5 LBS | BODY MASS INDEX: 40.4 KG/M2 | TEMPERATURE: 98 F | HEIGHT: 65 IN | OXYGEN SATURATION: 97 % | SYSTOLIC BLOOD PRESSURE: 136 MMHG

## 2019-09-21 DIAGNOSIS — F31.9 BIPOLAR 1 DISORDER: ICD-10-CM

## 2019-09-21 DIAGNOSIS — M54.42 ACUTE LEFT-SIDED LOW BACK PAIN WITH LEFT-SIDED SCIATICA: Primary | ICD-10-CM

## 2019-09-21 PROBLEM — K43.9 VENTRAL HERNIA WITHOUT OBSTRUCTION OR GANGRENE: Status: RESOLVED | Noted: 2019-07-17 | Resolved: 2019-09-21

## 2019-09-21 PROCEDURE — 99214 OFFICE O/P EST MOD 30 MIN: CPT | Mod: S$GLB,,, | Performed by: FAMILY MEDICINE

## 2019-09-21 PROCEDURE — 99999 PR PBB SHADOW E&M-EST. PATIENT-LVL V: CPT | Mod: PBBFAC,,, | Performed by: FAMILY MEDICINE

## 2019-09-21 PROCEDURE — 99999 PR PBB SHADOW E&M-EST. PATIENT-LVL V: ICD-10-PCS | Mod: PBBFAC,,, | Performed by: FAMILY MEDICINE

## 2019-09-21 PROCEDURE — 3008F BODY MASS INDEX DOCD: CPT | Mod: CPTII,S$GLB,, | Performed by: FAMILY MEDICINE

## 2019-09-21 PROCEDURE — 99214 PR OFFICE/OUTPT VISIT, EST, LEVL IV, 30-39 MIN: ICD-10-PCS | Mod: S$GLB,,, | Performed by: FAMILY MEDICINE

## 2019-09-21 PROCEDURE — 3008F PR BODY MASS INDEX (BMI) DOCUMENTED: ICD-10-PCS | Mod: CPTII,S$GLB,, | Performed by: FAMILY MEDICINE

## 2019-09-21 RX ORDER — CYCLOBENZAPRINE HCL 10 MG
10 TABLET ORAL 3 TIMES DAILY PRN
Qty: 30 TABLET | Refills: 2 | Status: SHIPPED | OUTPATIENT
Start: 2019-09-21 | End: 2019-10-01

## 2019-09-21 NOTE — PROGRESS NOTES
Chief Complaint   Patient presents with    Back Pain     Lower left side. Started a few weeks ago     Leg Pain       HPI  Zonia Skaggs is a 43 y.o. female with multiple medical diagnoses as listed in the medical history and problem list that presents for follow-up for low back pain that began two weeks ago    Low back pain- had started w/o injury and was followed by a fall. She also tried yoga but thinks she may have made things worse. Pain worse on her left side and radiates down her left leg all the way to her toes but she has not been feeling this as much    PAST MEDICAL HISTORY:  Past Medical History:   Diagnosis Date    Bipolar 1 disorder     GERD (gastroesophageal reflux disease)     History of psychiatric hospitalization     Hx of psychiatric care     Psychiatric problem     Seasonal allergies     Therapy     used to follow with Dr. 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   Social Needs    Financial resource strain: Not very hard    Food insecurity:     Worry: Sometimes true     Inability: Sometimes true    Transportation needs:     Medical: No     Non-medical: No   Tobacco Use    Smoking status: Never Smoker    Smokeless tobacco: Never Used   Substance and Sexual Activity    Alcohol use: No     Frequency: Never     Binge frequency: Never    Drug use: Never    Sexual activity: Yes     Partners: Male     Birth control/protection: None   Lifestyle    Physical activity:      Days per week: 3 days     Minutes per session: 20 min    Stress: To some extent   Relationships    Social connections:     Talks on phone: Three times a week     Gets together: Never     Attends Confucianism service: Not on file     Active member of club or organization: No     Attends meetings of clubs or organizations: Never     Relationship status:    Other Topics Concern    Patient feels they ought to cut down on drinking/drug use Not Asked    Patient annoyed by others criticizing their drinking/drug use Not Asked    Patient has felt bad or guilty about drinking/drug use Not Asked    Patient has had a drink/used drugs as an eye opener in the AM Not Asked   Social History Narrative    Not on file       FAMILY HISTORY:  Family History   Problem Relation Age of Onset    Bipolar disorder Maternal Aunt     Bipolar disorder Paternal Aunt     Diabetes type II Mother     Colon cancer Neg Hx     Ovarian cancer Neg Hx     Anxiety disorder Neg Hx     Depression Neg Hx     Suicide Neg Hx        ALLERGIES AND MEDICATIONS: updated and reviewed.  Review of patient's allergies indicates:   Allergen Reactions    Penicillins Hives and Shortness Of Breath    Morphine      Current Outpatient Medications   Medication Sig Dispense Refill    levocetirizine (XYZAL) 5 MG tablet Take 1 tablet (5 mg total) by mouth every evening. 30 tablet 11    omeprazole (PRILOSEC OTC) 20 MG tablet Take 1 tablet by mouth Daily.      ondansetron (ZOFRAN-ODT) 8 MG TbDL DISSOLVE 1 TABLET IN MOUTH EVERY 4 TO 6 HOURS AS NEEDED FOR NAUSEA  0    cetirizine (ZYRTEC) 10 MG tablet Take 10 mg by mouth once daily.      cyclobenzaprine (FLEXERIL) 10 MG tablet Take 1 tablet (10 mg total) by mouth 3 (three) times daily as needed. 30 tablet 2    docusate sodium (COLACE) 100 MG capsule Take 100 mg by mouth once daily.      ferrous sulfate 325 (65 FE) MG EC tablet Take 325 mg by mouth 3 (three) times daily with meals.       "methylPREDNISolone (MEDROL DOSEPACK) 4 mg tablet use as directed 1 Package 0    multivitamin with minerals tablet Take 1 tablet by mouth once daily.       No current facility-administered medications for this visit.        ROS  Review of Systems   Constitutional: Negative for chills, diaphoresis, fatigue, fever and unexpected weight change.   HENT: Negative for rhinorrhea, sinus pressure, sore throat and tinnitus.    Eyes: Negative for photophobia and visual disturbance.   Respiratory: Negative for cough, shortness of breath and wheezing.    Cardiovascular: Negative for chest pain and palpitations.   Gastrointestinal: Negative for abdominal pain, blood in stool, constipation, diarrhea, nausea and vomiting.   Genitourinary: Negative for dysuria, flank pain, frequency, hematuria, pelvic pain and vaginal discharge.   Musculoskeletal: Positive for back pain. Negative for arthralgias and joint swelling.   Skin: Negative for rash.   Neurological: Positive for numbness and headaches. Negative for speech difficulty, weakness and light-headedness.   Psychiatric/Behavioral: Negative for behavioral problems and dysphoric mood.       Physical Exam  Vitals:    09/21/19 1148   BP: 136/84   BP Location: Right arm   Patient Position: Sitting   BP Method: Large (Manual)   Pulse: 104   Resp: 16   Temp: 98 °F (36.7 °C)   TempSrc: Oral   SpO2: 97%   Weight: 110 kg (242 lb 8.1 oz)   Height: 5' 5" (1.651 m)    Body mass index is 40.36 kg/m².  Weight: 110 kg (242 lb 8.1 oz)   Height: 5' 5" (165.1 cm)     Physical Exam   Constitutional: She is oriented to person, place, and time. She appears well-developed and well-nourished.   HENT:   Head: Normocephalic.   Eyes: EOM are normal.   Musculoskeletal:        Arms:  Pain with flexion of spine, able to fully touch her toes, also able with limited rotation to the left but able to rotate to the right   Neurological: She is alert and oriented to person, place, and time.   Negative straight leg " raise bilaterally   Skin: Skin is warm and dry. No rash noted. No erythema.   Psychiatric: She has a normal mood and affect. Her behavior is normal.   Nursing note and vitals reviewed.      Health Maintenance       Date Due Completion Date    Pneumococcal Vaccine (Medium Risk) (1 of 1 - PPSV23) 10/21/1996 ---    Pap Smear 01/22/2015 1/22/2014    Influenza Vaccine (1) 09/01/2019 ---    Mammogram 06/20/2021 6/20/2019    TETANUS VACCINE 05/21/2029 5/21/2019          Health maintenance reviewed and addressed as ordered      ASSESSMENT     1. Acute left-sided low back pain with left-sided sciatica    2. Bipolar 1 disorder        PLAN:     Problem List Items Addressed This Visit        Psychiatric    Bipolar 1 disorder  -she is having some stressors with her marriage and is not sure if she should go back on her medication  -I recommend she make an appt with her psychiatrist to discuss her options      Other Visit Diagnoses     Acute left-sided low back pain with left-sided sciatica    -  Primary  -will refill flexeril  -home exercises given, PT referral as her pain is not improving    Relevant Medications    cyclobenzaprine (FLEXERIL) 10 MG tablet    Other Relevant Orders    Ambulatory consult to Physical Therapy            Snehal Reynoso MD  09/21/2019 11:57 AM        Follow up in about 6 weeks (around 11/2/2019) for Follow up.

## 2019-09-27 ENCOUNTER — OFFICE VISIT (OUTPATIENT)
Dept: PSYCHIATRY | Facility: CLINIC | Age: 44
End: 2019-09-27
Payer: COMMERCIAL

## 2019-09-27 VITALS
HEART RATE: 92 BPM | HEIGHT: 65 IN | BODY MASS INDEX: 39.94 KG/M2 | DIASTOLIC BLOOD PRESSURE: 70 MMHG | SYSTOLIC BLOOD PRESSURE: 118 MMHG | WEIGHT: 239.75 LBS

## 2019-09-27 DIAGNOSIS — F31.61 BIPOLAR 1 DISORDER, MIXED, MILD: Primary | ICD-10-CM

## 2019-09-27 DIAGNOSIS — F43.23 ADJUSTMENT DISORDER WITH MIXED ANXIETY AND DEPRESSED MOOD: ICD-10-CM

## 2019-09-27 PROCEDURE — 3008F PR BODY MASS INDEX (BMI) DOCUMENTED: ICD-10-PCS | Mod: CPTII,S$GLB,, | Performed by: PSYCHIATRY & NEUROLOGY

## 2019-09-27 PROCEDURE — 99999 PR PBB SHADOW E&M-EST. PATIENT-LVL III: ICD-10-PCS | Mod: PBBFAC,,, | Performed by: PSYCHIATRY & NEUROLOGY

## 2019-09-27 PROCEDURE — 99214 PR OFFICE/OUTPT VISIT, EST, LEVL IV, 30-39 MIN: ICD-10-PCS | Mod: S$GLB,,, | Performed by: PSYCHIATRY & NEUROLOGY

## 2019-09-27 PROCEDURE — 99999 PR PBB SHADOW E&M-EST. PATIENT-LVL III: CPT | Mod: PBBFAC,,, | Performed by: PSYCHIATRY & NEUROLOGY

## 2019-09-27 PROCEDURE — 3008F BODY MASS INDEX DOCD: CPT | Mod: CPTII,S$GLB,, | Performed by: PSYCHIATRY & NEUROLOGY

## 2019-09-27 PROCEDURE — 99214 OFFICE O/P EST MOD 30 MIN: CPT | Mod: S$GLB,,, | Performed by: PSYCHIATRY & NEUROLOGY

## 2019-09-27 RX ORDER — TOPIRAMATE 50 MG/1
50 TABLET, FILM COATED ORAL 2 TIMES DAILY
Qty: 60 TABLET | Refills: 1 | Status: SHIPPED | OUTPATIENT
Start: 2019-09-27 | End: 2019-11-07 | Stop reason: SDUPTHER

## 2019-09-27 NOTE — PROGRESS NOTES
Outpatient Psychiatry Follow-Up Visit (MD/NP)    9/27/2019    Clinical Status of Patient:  Outpatient (Ambulatory)    Chief Complaint:  Zonia Skaggs is a 43 y.o. female who presents today for follow-up of mood disorder.  Met with patient.      Interval History and Content of Current Session:  Interim Events/Subjective Report/Content of Current Session: Patient Zonia Skaggs presents to clinic for follow up after a long hiatus.  She says that she is very stressed at home and is worried that she is going to decompensate mentally.  She knows that in the past during her decompensation, she has had to be hospitalized.  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  She had surgery in July for hernia.  She had surgery last year for a broken hand.  She is undergoing marriage counseling.  Her oldest daughter moved out of the house to live with a man out of state but then had to move back home.  She has a friend who is on drugs and is trying to come to the house quite often.  She also recently finished the Medrol Dosepak.  She does not want to be on lithium but is not sure what medicine she should take to help control her mood at this time.  She is worried about walt.  In the past she has not been open to taking medications.    Psychotherapy:  · Target symptoms: mood disorder  · Why chosen therapy is appropriate versus another modality: relevant to diagnosis  · Outcome monitoring methods: self-report, observation  · Therapeutic intervention type: supportive psychotherapy  · Topics discussed/themes: building skills sets for symptom management, symptom recognition  · The patient's response to the intervention is accepting. The patient's progress toward treatment goals is limited.   · Duration of intervention: 15 minutes.    Review of Systems   · PSYCHIATRIC: Pertinant items are noted in the narrative.  · CONSTITUTIONAL: No weight gain or loss.   · MUSCULOSKELETAL: No pain or stiffness of the joints.  · NEUROLOGIC: No weakness,  "sensory changes, seizures, confusion, memory loss, tremor or other abnormal movements.  · RESPIRATORY: No shortness of breath.  · CARDIOVASCULAR: No tachycardia or chest pain.  · GASTROINTESTINAL: No nausea, vomiting, pain, constipation or diarrhea.    Past Medical, Family and Social History: The patient's past medical, family and social history have been reviewed and updated as appropriate within the electronic medical record - see encounter notes.    Compliance: yes    Side effects: None    Risk Parameters:  Patient reports no suicidal ideation  Patient reports no homicidal ideation  Patient reports no self-injurious behavior  Patient reports no violent behavior    Exam (detailed: at least 9 elements; comprehensive: all 15 elements)   Constitutional  Vitals:  Most recent vital signs, dated less than 90 days prior to this appointment, were reviewed.   Vitals:    09/27/19 0933   BP: 118/70   Pulse: 92   Weight: 108.7 kg (239 lb 12 oz)   Height: 5' 5" (1.651 m)        General:  age appropriate, overweight     Musculoskeletal  Muscle Strength/Tone:  no tremor, no tic   Gait & Station:  non-ataxic     Psychiatric  Speech:  no latency; no press   Mood & Affect:  anxious, dysthymic  anxious   Thought Process:  normal and logical   Associations:  intact   Thought Content:  normal, no suicidality, no homicidality, delusions, or paranoia   Insight:  has awareness of illness   Judgement: behavior is adequate to circumstances   Orientation:  person, place, situation, time/date   Memory: intact for content of interview   Language: grossly intact   Attention Span & Concentration:  able to focus   Fund of Knowledge:  intact and appropriate to age and level of education     Assessment and Diagnosis   Status/Progress: Based on the examination today, the patient's problem(s) is/are inadequately controlled.  New problems have been presented today.   Co-morbidities are complicating management of the primary condition.  There are no " active rule-out diagnoses for this patient at this time.     General Impression: We will continue pharmacological intervention and adjunctive therapy.       ICD-10-CM ICD-9-CM   1. Bipolar 1 disorder, mixed, mild F31.61 296.61   2. Adjustment disorder with mixed anxiety and depressed mood F43.23 309.28       Intervention/Counseling/Treatment Plan   · Medication Management: Continue current medications. The risks and benefits of medication were discussed with the patient.  · Counseling provided with patient as follows: importance of compliance with chosen treatment options was emphasized, risks and benefits of treatment options, including medications, were discussed with the patient, risk factor reduction, prognosis, patient education, instructions for  management, treatment and follow-up were reviewed  1.  Start topiramate 50mg tapering up to BID targeting mood stabilization if she would like to start.  Warned of risk of word finding difficulties.  If she starts this medication, she is to come in for a follow up.  2.  May consider augmenting with Effexor XR to help control the stress.    Return to Clinic: 6 weeks, as needed

## 2019-09-27 NOTE — PATIENT INSTRUCTIONS
"        You have been provided with a certain amount of medication with a specified number of refills.  Please follow up within an adequate time before you run out of medications.    REFILLS FOR CONTROLLED SUBSTANCES WILL NOT BE GIVEN WITHOUT AN APPOINTMENT.  I will not honor or fill automated refill requests from pharmacies.  You must come in for an appointment to get refills.        Please book your next appointment for myself or therapist by phone by calling our office at [phone].        Note that these follow up appointments are 20 minutes long.  It is important that we focus on medication management.  Should you need therapy, please get set up with our therapist or call your insurance company to find out which therapists are available in your area.      PLEASE BE AT LEAST 15 MINUTES EARLY FOR YOUR NEXT APPOINTMENT.  Late arrivals WILL BE TURNED AWAY AND ASKED TO RESCHEDULE.  YOU MUST COME EARLY TO ALLOW TIME FOR CHECK-IN AS WELL AS GET YOUR VITAL SIGNS AND GO OVER YOUR MEDICATIONS.  Tardiness is not fair to the patients who present after you and are on time for their appointments.  It causes a delay in the appointments for patients and staff.  YOU MAY ALSO BE DISCHARGED FROM CLINIC with multiple late arrivals or "No Show" appointments.       -----------------------------------------------------------------------------------------------------------------  IF YOU FEEL SUICIDAL OR HAVING THOUGHTS OR PLANS TO HURT YOURSELF OR OTHERS, CALL 911 OR REPORT TO THE NEAREST EMERGENCY ROOM.  YOU CAN ALSO ACCESS THE FOLLOWING HOTLINE:    National Suicide Hotline Number 2-199-442-TALK (0200)                  "

## 2019-10-21 ENCOUNTER — PATIENT OUTREACH (OUTPATIENT)
Dept: ADMINISTRATIVE | Facility: HOSPITAL | Age: 44
End: 2019-10-21

## 2019-10-31 ENCOUNTER — TELEPHONE (OUTPATIENT)
Dept: PSYCHIATRY | Facility: HOSPITAL | Age: 44
End: 2019-10-31

## 2019-10-31 NOTE — TELEPHONE ENCOUNTER
----- Message from Sherri Gallagher MA sent at 10/31/2019 12:56 PM CDT -----      ----- Message -----  From: Patrica Costello  Sent: 10/31/2019  12:50 PM CDT  To: [facility] Staff    Pt called to see if she can get something to help her sleep. Pt asked for a call back.      Pt contact # [phone].        Thanks

## 2019-10-31 NOTE — TELEPHONE ENCOUNTER
Not manic.  Just not sleeping.  Told her to try melatonin (up to 5mg) nightly to see if this helps.  If not, she is to send me a message and we will likely try trazodone 25-50mg.

## 2019-11-05 ENCOUNTER — OFFICE VISIT (OUTPATIENT)
Dept: PSYCHIATRY | Facility: CLINIC | Age: 44
End: 2019-11-05
Payer: COMMERCIAL

## 2019-11-05 DIAGNOSIS — F31.61 BIPOLAR 1 DISORDER, MIXED, MILD: Primary | ICD-10-CM

## 2019-11-05 PROCEDURE — 90791 PR PSYCHIATRIC DIAGNOSTIC EVALUATION: ICD-10-PCS | Mod: S$GLB,,, | Performed by: SOCIAL WORKER

## 2019-11-05 PROCEDURE — 90791 PSYCH DIAGNOSTIC EVALUATION: CPT | Mod: S$GLB,,, | Performed by: SOCIAL WORKER

## 2019-11-05 NOTE — PROGRESS NOTES
"Psychiatry Initial Visit (PhD/LCSW)  Diagnostic Interview - CPT 29197    Date: 11/5/2019    Site: [facility]    Referral source: Dr. Whitehead    Clinical status of patient: Outpatient    Zonia Skaggs, a 44 y.o. female, for initial evaluation visit.  Met with patient.    Chief complaint/reason for encounter: depression, mood swings, anxiety and sleep    History of present illness: Reports that she started a new medication and prior to that was experiencing a lot of stress. 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 States that prior to visit with Dr. Whitehead she had stopped taking care of homework and other school things for children. The stress has been going on for months. States that during that time she was both depressed and anxious. When she is depressed she has multiple crying spells, "not functioning", and not doing things that she normally does. Endorses feelings of sadness. Identifies with problems with motivation and energy. Also struggles with being able to focus. When she was stressed she would lose sleep. States that , daughter, and mother stepped in to help and they started to understand all the stress that patient was under. Reports that she worries a lot. States that she normally worries about finances and children. States that in the past struggled with being irritable but with mood stabilizer that is more under control. States that she was very angry all the time, reports that patient and  were close to going through a divorce. Also states that she found another woman's number in his phone, the two started going through marriage counseling over the summer. States that when she is down she thinks about harming herself. Reports that she has had thoughts of life would be better if she was invisible or a ghost. Denies suicidal plan. Denies past of trying to harm herself.     Reports that sleep is "okay". Reports that she gets about 4-5 hours of " "sleep on average. Trouble falling asleep, racing thoughts. States that she also wakes up after a couple hours of sleep and is unable to return to sleep, racing thoughts/over thinking. Occasional does nap during the day. Appetite is "okay". Occasional problems with ADL's, mostly when she is depressed.     Pain: noncontributory    Symptoms:   · Mood: depressed mood, diminished interest, insomnia, fatigue, worthlessness/guilt and social isolation  · Anxiety: excessive anxiety/worry  · Substance abuse: denied  · Cognitive functioning: denied  · Health behaviors: noncontributory    Psychiatric history: hospitalized 4 times for psychiatric reasons, last hospitalization was ~2016. Struggled with postpartum depression and was hospitalized. Has been seeing Dr. Whitehead for a couple years. Was previously seeing Dr. Rogers until he passed away. Saw a therapist in the past but it wasn't anything long term.     Medical history: anemia, pre-diabetes    Family history of psychiatric illness: mother - bipolar II    Social history (marriage, employment, etc.): Born and raised in [place]. Reports that childhood was "average". 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 Parents are still . 1 brother, older. 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 5 children (20, 13, 10, 5, 3). Lives in home with  and children. Never arrested. Hobbies included baking, cooking, gardening, reading, and painting. SAHM,  works as  (supervisor).    Substance use:   Alcohol: none   Drugs: none   Tobacco: none   Caffeine: coffee, occasional, unless she is not feeling well and then stays away because it makes sleep difficult.     Current medications and drug reactions (include OTC, herbal): see medication list.     Strengths and liabilities: Strength: Patient accepts guidance/feedback, Strength: Patient is expressive/articulate., " Strength: Patient is intelligent., Strength: Patient is motivated for change., Liability: Patient lacks coping skills.    Current Evaluation:     Mental Status Exam:  General Appearance:  unremarkable, age appropriate   Speech: normal tone, normal rate, normal pitch, normal volume      Level of Cooperation: cooperative      Thought Processes: normal and logical   Mood: euthymic      Thought Content: normal, no suicidality, no homicidality, delusions, or paranoia   Affect: congruent and appropriate   Orientation: Oriented x3   Memory: recent >  intact, remote >  intact   Attention Span & Concentration: intact   Fund of General Knowledge: intact and appropriate to age and level of education   Abstract Reasoning: did not assess   Judgment & Insight: fair     Language  intact     Diagnostic Impression - Plan:       ICD-10-CM ICD-9-CM   1. Bipolar 1 disorder, mixed, mild F31.61 296.61       Plan:individual psychotherapy and medication management by physician    Return to Clinic: 2 weeks, 1 month    Length of Service (minutes): 45     Cassandra Rockweiler, LCSW-[facility]

## 2019-11-07 ENCOUNTER — OFFICE VISIT (OUTPATIENT)
Dept: PSYCHIATRY | Facility: CLINIC | Age: 44
End: 2019-11-07
Payer: COMMERCIAL

## 2019-11-07 VITALS
WEIGHT: 231.69 LBS | BODY MASS INDEX: 38.6 KG/M2 | SYSTOLIC BLOOD PRESSURE: 130 MMHG | HEART RATE: 102 BPM | OXYGEN SATURATION: 98 % | HEIGHT: 65 IN | DIASTOLIC BLOOD PRESSURE: 94 MMHG

## 2019-11-07 DIAGNOSIS — F43.23 ADJUSTMENT DISORDER WITH MIXED ANXIETY AND DEPRESSED MOOD: ICD-10-CM

## 2019-11-07 DIAGNOSIS — F31.61 BIPOLAR 1 DISORDER, MIXED, MILD: Primary | ICD-10-CM

## 2019-11-07 PROCEDURE — 99999 PR PBB SHADOW E&M-EST. PATIENT-LVL III: CPT | Mod: PBBFAC,,, | Performed by: PSYCHIATRY & NEUROLOGY

## 2019-11-07 PROCEDURE — 99999 PR PBB SHADOW E&M-EST. PATIENT-LVL III: ICD-10-PCS | Mod: PBBFAC,,, | Performed by: PSYCHIATRY & NEUROLOGY

## 2019-11-07 PROCEDURE — 3008F BODY MASS INDEX DOCD: CPT | Mod: CPTII,S$GLB,, | Performed by: PSYCHIATRY & NEUROLOGY

## 2019-11-07 PROCEDURE — 3008F PR BODY MASS INDEX (BMI) DOCUMENTED: ICD-10-PCS | Mod: CPTII,S$GLB,, | Performed by: PSYCHIATRY & NEUROLOGY

## 2019-11-07 PROCEDURE — 99214 OFFICE O/P EST MOD 30 MIN: CPT | Mod: S$GLB,,, | Performed by: PSYCHIATRY & NEUROLOGY

## 2019-11-07 PROCEDURE — 99214 PR OFFICE/OUTPT VISIT, EST, LEVL IV, 30-39 MIN: ICD-10-PCS | Mod: S$GLB,,, | Performed by: PSYCHIATRY & NEUROLOGY

## 2019-11-07 RX ORDER — OLANZAPINE 5 MG/1
5 TABLET ORAL NIGHTLY
Qty: 90 TABLET | Refills: 0 | Status: ON HOLD | OUTPATIENT
Start: 2019-11-07 | End: 2019-11-26 | Stop reason: HOSPADM

## 2019-11-07 RX ORDER — TOPIRAMATE 50 MG/1
50 TABLET, FILM COATED ORAL 2 TIMES DAILY
Qty: 180 TABLET | Refills: 1 | Status: SHIPPED | OUTPATIENT
Start: 2019-11-07 | End: 2020-01-09 | Stop reason: SDUPTHER

## 2019-11-07 NOTE — PROGRESS NOTES
Outpatient Psychiatry Follow-Up Visit (MD/NP)    11/7/2019    Clinical Status of Patient:  Outpatient (Ambulatory)    Chief Complaint:  Zonia Skaggs is a 44 y.o. female who presents today for follow-up of mood disorder.  Met with patient.      Interval History and Content of Current Session:  Interim Events/Subjective Report/Content of Current Session: Patient Zonia Skaggs presents to clinic for follow up.  She called me a couple of weeks ago worried that she was not sleeping.  I attempted to tease out any manic symptoms at that time but she minimized.  Today, she tells me that she has been bordering on walt for 2-3 weeks without sleep.  She was depressed and crying more through the end of October and then evolved into an elevated/manic state.  She is not depressed anymore.  During our phone call, I told her to start melatonin which she feels did not help much.  Topiramate helps but she feels that she can be elevated and somewhat manic if she takes too late at night.  She feels that it affects her sleep.  She has started exercising again and this makes her feel better.  She also started with the therapist and feels that there is progress to be made.  Has cut back her caffeine intake because she feels that she is sensitive.   and daughter ridicule her at times because of her aggressive an elevated state.  She does not like this.  Daughter moved back home and this is an added stressor but the daughter does help with the other children.  Daughter is also going to sign up with the [employer].  She is doing couples counseling because she feels that the  is cheating but says that she does not have enough prove.  These elevated states come and go and she is still having them.  She is hesitant about taking medicines and does not want to be on a regularly scheduled medication.  Mother comes in and offers her advice.  Mother feels that patient is overly stressed and needs a break.    Psychotherapy:  · Target  "symptoms: mood disorder  · Why chosen therapy is appropriate versus another modality: relevant to diagnosis  · Outcome monitoring methods: self-report, observation  · Therapeutic intervention type: supportive psychotherapy  · Topics discussed/themes: building skills sets for symptom management, symptom recognition  · The patient's response to the intervention is accepting. The patient's progress toward treatment goals is limited.   · Duration of intervention: 15 minutes.    Review of Systems   · PSYCHIATRIC: Pertinant items are noted in the narrative.  · CONSTITUTIONAL: No weight gain or loss.   · MUSCULOSKELETAL: No pain or stiffness of the joints.  · NEUROLOGIC: No weakness, sensory changes, seizures, confusion, memory loss, tremor or other abnormal movements.  · RESPIRATORY: No shortness of breath.  · CARDIOVASCULAR: No tachycardia or chest pain.  · GASTROINTESTINAL: No nausea, vomiting, pain, constipation or diarrhea.    Past Medical, Family and Social History: The patient's past medical, family and social history have been reviewed and updated as appropriate within the electronic medical record - see encounter notes.    Compliance:  Not fully compliant with medications because she does not want to take medications daily.    Side effects: None    Risk Parameters:  Patient reports no suicidal ideation  Patient reports no homicidal ideation  Patient reports no self-injurious behavior  Patient reports no violent behavior    Exam (detailed: at least 9 elements; comprehensive: all 15 elements)   Constitutional  Vitals:  Most recent vital signs, dated less than 90 days prior to this appointment, were reviewed.   Vitals:    11/07/19 0810   BP: (!) 130/94   Pulse: 102   SpO2: 98%   Weight: 105.1 kg (231 lb 11.3 oz)   Height: 5' 5" (1.651 m)        General:  age appropriate, overweight     Musculoskeletal  Muscle Strength/Tone:  no tremor, no tic   Gait & Station:  non-ataxic     Psychiatric  Speech:  no latency; no " press   Mood & Affect:  anxious, dysthymic  anxious   Thought Process:  normal and logical   Associations:  intact   Thought Content:  normal, no suicidality, no homicidality, delusions, or paranoia   Insight:  has awareness of illness   Judgement: behavior is adequate to circumstances   Orientation:  person, place, situation, time/date   Memory: intact for content of interview   Language: grossly intact   Attention Span & Concentration:  able to focus   Fund of Knowledge:  intact and appropriate to age and level of education     Assessment and Diagnosis   Status/Progress: Based on the examination today, the patient's problem(s) is/are inadequately controlled.  New problems have been presented today.   Co-morbidities are complicating management of the primary condition.  There are no active rule-out diagnoses for this patient at this time.     General Impression: We will continue pharmacological intervention and adjunctive therapy.       ICD-10-CM ICD-9-CM   1. Bipolar 1 disorder, mixed, mild F31.61 296.61   2. Adjustment disorder with mixed anxiety and depressed mood F43.23 309.28       Intervention/Counseling/Treatment Plan   · Medication Management: Continue current medications. The risks and benefits of medication were discussed with the patient.  · Counseling provided with patient as follows: importance of compliance with chosen treatment options was emphasized, risks and benefits of treatment options, including medications, were discussed with the patient, risk factor reduction, prognosis, patient education, instructions for  management, treatment and follow-up were reviewed  1.  Urged to be compliant with taking b.i.d. topiramate 50mg tapering BID targeting mood stabilization if she would like to start.  Warned of risk of word finding difficulties.  She does not have to take the dose at bedtime but yet take the 2nd dose in the middle of the day.  2.  Provided a prescription for olanzapine 5 mg p.o. nightly  p.r.n. anxiety, elevated state, hypomania, sleep problems.  Warned of risk of TD, EPS, metabolic syndrome.  She does not have to take this medication every night but it will help with her sleepless nights and hypomanic episodes.  She should take this for the next couple of nights.  3.  Told patient to limit stressors in life and start some form of meditation to help with stress management.  Continue with individual therapy.    Return to Clinic: 3 months, as needed

## 2019-11-07 NOTE — PATIENT INSTRUCTIONS
"        You have been provided with a certain amount of medication with a specified number of refills.  Please follow up within an adequate time before you run out of medications.    REFILLS FOR CONTROLLED SUBSTANCES WILL NOT BE GIVEN WITHOUT AN APPOINTMENT.  I will not honor or fill automated refill requests from pharmacies.  You must come in for an appointment to get refills.        Please book your next appointment for myself or therapist by phone by calling our office at [phone].        Note that these follow up appointments are 20 minutes long.  It is important that we focus on medication management.  Should you need therapy, please get set up with our therapist or call your insurance company to find out which therapists are available in your area.      PLEASE BE AT LEAST 15 MINUTES EARLY FOR YOUR NEXT APPOINTMENT.  Late arrivals WILL BE TURNED AWAY AND ASKED TO RESCHEDULE.  YOU MUST COME EARLY TO ALLOW TIME FOR CHECK-IN AS WELL AS GET YOUR VITAL SIGNS AND GO OVER YOUR MEDICATIONS.  Tardiness is not fair to the patients who present after you and are on time for their appointments.  It causes a delay in the appointments for patients and staff.  YOU MAY ALSO BE DISCHARGED FROM CLINIC with multiple late arrivals or "No Show" appointments.       -----------------------------------------------------------------------------------------------------------------  IF YOU FEEL SUICIDAL OR HAVING THOUGHTS OR PLANS TO HURT YOURSELF OR OTHERS, CALL 911 OR REPORT TO THE NEAREST EMERGENCY ROOM.  YOU CAN ALSO ACCESS THE FOLLOWING HOTLINE:    National Suicide Hotline Number 6-931-374-TALK (2572)                  "

## 2019-11-18 ENCOUNTER — TELEPHONE (OUTPATIENT)
Dept: PSYCHIATRY | Facility: HOSPITAL | Age: 44
End: 2019-11-18

## 2019-11-18 NOTE — TELEPHONE ENCOUNTER
"----- Message from Sherri Gallagher MA sent at 11/18/2019  2:27 PM CST -----  Contact:  Telma  Patient's  said patient is "not acting herself, took off in the car and has been gone for hours." He said she needs to be admitted and does not know what to do. Would like to speak to someone.   "

## 2019-11-18 NOTE — TELEPHONE ENCOUNTER
says that she is back home.  He called the police and she took a topiramate.  She is calm but still not at her norm.  Told  to give her an olanzapine now and start one of these each night, starting tonight.  Told  to try and have her brought to the ED if she gets worse.  If she is unwilling, he is to call the [facility] 's office and have her evaluated for urgent care.

## 2019-11-19 ENCOUNTER — HOSPITAL ENCOUNTER (EMERGENCY)
Facility: HOSPITAL | Age: 44
Discharge: PSYCHIATRIC OR SUBSTANCE USE FACILITY | End: 2019-11-19
Attending: EMERGENCY MEDICINE
Payer: COMMERCIAL

## 2019-11-19 VITALS
DIASTOLIC BLOOD PRESSURE: 78 MMHG | BODY MASS INDEX: 36.74 KG/M2 | HEART RATE: 98 BPM | RESPIRATION RATE: 16 BRPM | OXYGEN SATURATION: 100 % | SYSTOLIC BLOOD PRESSURE: 151 MMHG | TEMPERATURE: 98 F | WEIGHT: 228.63 LBS | HEIGHT: 66 IN

## 2019-11-19 DIAGNOSIS — F30.9 MANIC EPISODE: Primary | ICD-10-CM

## 2019-11-19 DIAGNOSIS — Z00.8 MEDICAL CLEARANCE FOR PSYCHIATRIC ADMISSION: ICD-10-CM

## 2019-11-19 PROBLEM — F31.2 BIPOLAR DISORDER, CURRENT EPISODE MANIC SEVERE WITH PSYCHOTIC FEATURES: Status: ACTIVE | Noted: 2019-05-21

## 2019-11-19 LAB
ALBUMIN SERPL BCP-MCNC: 4.1 G/DL (ref 3.5–5.2)
ALP SERPL-CCNC: 70 U/L (ref 55–135)
ALT SERPL W/O P-5'-P-CCNC: 20 U/L (ref 10–44)
AMPHET+METHAMPHET UR QL: NEGATIVE
ANION GAP SERPL CALC-SCNC: 8 MMOL/L (ref 8–16)
APAP SERPL-MCNC: <3 UG/ML (ref 10–20)
AST SERPL-CCNC: 17 U/L (ref 10–40)
B-HCG UR QL: NEGATIVE
B-HCG UR QL: NEGATIVE
BACTERIA #/AREA URNS AUTO: ABNORMAL /HPF
BARBITURATES UR QL SCN>200 NG/ML: NEGATIVE
BASOPHILS # BLD AUTO: 0.05 K/UL (ref 0–0.2)
BASOPHILS NFR BLD: 0.7 % (ref 0–1.9)
BENZODIAZ UR QL SCN>200 NG/ML: NEGATIVE
BILIRUB SERPL-MCNC: 0.5 MG/DL (ref 0.1–1)
BILIRUB UR QL STRIP: NEGATIVE
BUN SERPL-MCNC: 11 MG/DL (ref 6–20)
BZE UR QL SCN: NEGATIVE
CALCIUM SERPL-MCNC: 9.7 MG/DL (ref 8.7–10.5)
CANNABINOIDS UR QL SCN: NEGATIVE
CHLORIDE SERPL-SCNC: 108 MMOL/L (ref 95–110)
CLARITY UR REFRACT.AUTO: ABNORMAL
CO2 SERPL-SCNC: 25 MMOL/L (ref 23–29)
COLOR UR AUTO: YELLOW
CREAT SERPL-MCNC: 0.8 MG/DL (ref 0.5–1.4)
CREAT UR-MCNC: 129 MG/DL (ref 15–325)
CTP QC/QA: YES
DIFFERENTIAL METHOD: ABNORMAL
EOSINOPHIL # BLD AUTO: 0.2 K/UL (ref 0–0.5)
EOSINOPHIL NFR BLD: 2 % (ref 0–8)
ERYTHROCYTE [DISTWIDTH] IN BLOOD BY AUTOMATED COUNT: 16.6 % (ref 11.5–14.5)
EST. GFR  (AFRICAN AMERICAN): >60 ML/MIN/1.73 M^2
EST. GFR  (NON AFRICAN AMERICAN): >60 ML/MIN/1.73 M^2
ETHANOL SERPL-MCNC: <10 MG/DL
GLUCOSE SERPL-MCNC: 85 MG/DL (ref 70–110)
GLUCOSE UR QL STRIP: NEGATIVE
HCT VFR BLD AUTO: 39.4 % (ref 37–48.5)
HGB BLD-MCNC: 11.6 G/DL (ref 12–16)
HGB UR QL STRIP: ABNORMAL
IMM GRANULOCYTES # BLD AUTO: 0.02 K/UL (ref 0–0.04)
IMM GRANULOCYTES NFR BLD AUTO: 0.3 % (ref 0–0.5)
KETONES UR QL STRIP: ABNORMAL
LEUKOCYTE ESTERASE UR QL STRIP: ABNORMAL
LYMPHOCYTES # BLD AUTO: 2.4 K/UL (ref 1–4.8)
LYMPHOCYTES NFR BLD: 30.8 % (ref 18–48)
MCH RBC QN AUTO: 25 PG (ref 27–31)
MCHC RBC AUTO-ENTMCNC: 29.4 G/DL (ref 32–36)
MCV RBC AUTO: 85 FL (ref 82–98)
METHADONE UR QL SCN>300 NG/ML: NEGATIVE
MICROSCOPIC COMMENT: ABNORMAL
MONOCYTES # BLD AUTO: 0.5 K/UL (ref 0.3–1)
MONOCYTES NFR BLD: 6 % (ref 4–15)
NEUTROPHILS # BLD AUTO: 4.6 K/UL (ref 1.8–7.7)
NEUTROPHILS NFR BLD: 60.2 % (ref 38–73)
NITRITE UR QL STRIP: NEGATIVE
NRBC BLD-RTO: 0 /100 WBC
OPIATES UR QL SCN: NEGATIVE
PCP UR QL SCN>25 NG/ML: NEGATIVE
PH UR STRIP: 5 [PH] (ref 5–8)
PLATELET # BLD AUTO: 378 K/UL (ref 150–350)
PMV BLD AUTO: 9.3 FL (ref 9.2–12.9)
POTASSIUM SERPL-SCNC: 3.7 MMOL/L (ref 3.5–5.1)
PROT SERPL-MCNC: 7.3 G/DL (ref 6–8.4)
PROT UR QL STRIP: NEGATIVE
RBC # BLD AUTO: 4.64 M/UL (ref 4–5.4)
RBC #/AREA URNS AUTO: 12 /HPF (ref 0–4)
SALICYLATES SERPL-MCNC: <5 MG/DL (ref 15–30)
SODIUM SERPL-SCNC: 141 MMOL/L (ref 136–145)
SP GR UR STRIP: 1.01 (ref 1–1.03)
SQUAMOUS #/AREA URNS AUTO: 11 /HPF
TOXICOLOGY INFORMATION: NORMAL
TSH SERPL DL<=0.005 MIU/L-ACNC: 0.81 UIU/ML (ref 0.4–4)
URN SPEC COLLECT METH UR: ABNORMAL
WBC # BLD AUTO: 7.62 K/UL (ref 3.9–12.7)
WBC #/AREA URNS AUTO: 23 /HPF (ref 0–5)

## 2019-11-19 PROCEDURE — 80329 ANALGESICS NON-OPIOID 1 OR 2: CPT

## 2019-11-19 PROCEDURE — 80320 DRUG SCREEN QUANTALCOHOLS: CPT

## 2019-11-19 PROCEDURE — 85025 COMPLETE CBC W/AUTO DIFF WBC: CPT

## 2019-11-19 PROCEDURE — 80307 DRUG TEST PRSMV CHEM ANLYZR: CPT

## 2019-11-19 PROCEDURE — 99283 EMERGENCY DEPT VISIT LOW MDM: CPT | Mod: ,,, | Performed by: PSYCHIATRY & NEUROLOGY

## 2019-11-19 PROCEDURE — 84443 ASSAY THYROID STIM HORMONE: CPT

## 2019-11-19 PROCEDURE — 99283 PR EMERGENCY DEPT VISIT,LEVEL III: ICD-10-PCS | Mod: ,,, | Performed by: PSYCHIATRY & NEUROLOGY

## 2019-11-19 PROCEDURE — 99285 PR EMERGENCY DEPT VISIT,LEVEL V: ICD-10-PCS | Mod: ,,, | Performed by: EMERGENCY MEDICINE

## 2019-11-19 PROCEDURE — 87086 URINE CULTURE/COLONY COUNT: CPT

## 2019-11-19 PROCEDURE — 81001 URINALYSIS AUTO W/SCOPE: CPT | Mod: 59

## 2019-11-19 PROCEDURE — 99285 EMERGENCY DEPT VISIT HI MDM: CPT

## 2019-11-19 PROCEDURE — 81025 URINE PREGNANCY TEST: CPT | Performed by: EMERGENCY MEDICINE

## 2019-11-19 PROCEDURE — 80053 COMPREHEN METABOLIC PANEL: CPT

## 2019-11-19 PROCEDURE — 81025 URINE PREGNANCY TEST: CPT

## 2019-11-19 PROCEDURE — 99285 EMERGENCY DEPT VISIT HI MDM: CPT | Mod: ,,, | Performed by: EMERGENCY MEDICINE

## 2019-11-19 RX ORDER — DIPHENHYDRAMINE HCL 50 MG
50 CAPSULE ORAL EVERY 4 HOURS PRN
Status: DISCONTINUED | OUTPATIENT
Start: 2019-11-19 | End: 2019-11-19 | Stop reason: HOSPADM

## 2019-11-19 RX ORDER — OLANZAPINE 10 MG/2ML
5 INJECTION, POWDER, FOR SOLUTION INTRAMUSCULAR EVERY 8 HOURS PRN
Status: DISCONTINUED | OUTPATIENT
Start: 2019-11-19 | End: 2019-11-19 | Stop reason: HOSPADM

## 2019-11-19 RX ORDER — ACETAMINOPHEN 325 MG/1
650 TABLET ORAL EVERY 6 HOURS PRN
Status: DISCONTINUED | OUTPATIENT
Start: 2019-11-19 | End: 2019-11-19 | Stop reason: HOSPADM

## 2019-11-19 RX ORDER — MAG HYDROX/ALUMINUM HYD/SIMETH 200-200-20
30 SUSPENSION, ORAL (FINAL DOSE FORM) ORAL EVERY 6 HOURS PRN
Status: DISCONTINUED | OUTPATIENT
Start: 2019-11-19 | End: 2019-11-19 | Stop reason: HOSPADM

## 2019-11-19 NOTE — ED NOTES
Pt escorted to the AA vehicle. Pt remained calm and cooperative. AA staff given PEC paper work and 1 bag of pt belongings.

## 2019-11-19 NOTE — CONSULTS
PSYCHIATRY CONSULT TO ED  Brief plan of care note FULL note to follow later    HPI  Zonia Skaggs is a 44 y.o. female with past psychiatric history of Bipolar d/o presented to ED on OPC by pts 20yr old daughter for non compliance with meds and manic symtpoms    On evaluation pt is irritable, restless, some PMA and paranoia. Pt giving inconsistent information during the intereview. Admits to talking to herslef and not sleeping well. Also pt has some mood lability for laughing to crying through out the interview.      IMPRESSION:   BIPOLAR II d/o- Acute walt    RECOMMENDATIONS:     DISPOSITION- Once medically cleared;    Seek Involuntary Inpt psychiatric admission for stabilization of acute psychiatric symptoms. PLEASE call [facility] inpt psych unit for placement.     PSYCHIATRIC MEDICATIONS  · Scheduled- resume Zyprexa 5mg qhs  · PRN Zyprexa 5mg q6 PO/IM for non redirectable agitation ; do not combine or administer within one hour of giving a Benzodiazepine   ·     LEGAL-   Seek/continue PEC because pt is in imminent danger of hurting self or others and is gravely disabled. Please provide with 1:1 sitter and concern for violence     OTHER   Consulting clinician was informed of the encounter and consult note.   Please re-consult for further follow up or reassessment     More than 50% of the time was spent counseling/coordinating care    NELDA WHATLEY MD   ON CALL [facility]

## 2019-11-19 NOTE — ED NOTES
Patient turned over me by Dr. Ortiz.  Pending evaluation by psychiatrist.    Psychiatrist evaluated bedside and feels the patient is having an acute manic episode and is gravely disabled.  She recommends pec and Zyprexa.  So ordered.  Moved to [facility] with hold orders.    Did bedside teaching.  All questions answered.  Patient acknowledges understanding.     Butch Bailey MD  11/19/19 4272
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
## 2019-11-19 NOTE — ED NOTES
Pt transferred to  2, she is anxious but cooperative. PEC called and faxed to the coroners office. Pt checked for contraband and wearing paper scrubs. Pt belongings secured. Pt denies SI/HI/AVH's, she is not accepting of her pending admit. Pt informed of her rights and the PEC process. Pt urine sample brought to lab.
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
## 2019-11-20 PROBLEM — R45.850 HOMICIDAL IDEATIONS: Status: ACTIVE | Noted: 2019-11-20

## 2019-11-20 PROBLEM — K21.9 GASTROESOPHAGEAL REFLUX DISEASE WITHOUT ESOPHAGITIS: Status: ACTIVE | Noted: 2019-11-20

## 2019-11-20 PROBLEM — S05.31XA: Status: ACTIVE | Noted: 2019-11-20

## 2019-11-20 PROBLEM — J30.2 SEASONAL ALLERGIC RHINITIS: Status: ACTIVE | Noted: 2019-11-20

## 2019-11-20 PROBLEM — R73.03 BORDERLINE DIABETES: Status: ACTIVE | Noted: 2019-11-20

## 2019-11-20 PROBLEM — R82.90 ABNORMAL URINALYSIS: Status: ACTIVE | Noted: 2019-11-20

## 2019-11-20 NOTE — CONSULTS
"[facility]  ED  Psychiatry  Consult Note    Patient Name: Zonia Skaggs  MRN: [id number]   Code Status: Prior  Admission Date: 11/19/2019  Hospital Length of Stay: 0 days  Attending Physician: No att. providers found  Primary Care Provider: Snehal Reynoso MD    Current Legal Status: PEC    Patient information was obtained from patient, parent, past medical records and ER records.   Consults  Subjective:       Chief Complaint:  walt     HPI  Zonia Skaggs is a 44 y.o. female with past psychiatric history of Bipolar d/o presented to ED on OPC by pts 20yr old daughter for non compliance with meds and manic symtpoms    On evaluation pt is irritable, restless, some PMA and paranoia. Pt giving inconsistent information during the intereview. Admits to talking to herself and not sleeping well. Also pt has some mood lability, going from laughing to crying through out the interview. Pt very guarded. Pt frequently looked outside the room and appeared worried. Pt states that ehr  cheated on her and now they are  but she doesn't want to leave the house. She reports that yesterday she got into an argument with her neighbors cause they had covered her mailbox. Pt giving some illogical history when pressing for details and reframing her answers pt becomes irritable and refusing to participate.  Inquired pt about her bruise on her right eye lid. Pt report it was "a silly accident" and refused to elaborate anymore stating "I dont want to talk about it"    Collateral  Spoke to pts mother, Diamond Leggett, Cell- , (Pt also gave number for Father Rodri [phone])  She reports that she is very concerned for pt because she hasnt slept for days. She states that yesterday after an argument with her  she "took off in the car" for hours and nobody knew where she went or what she did. Mother also notes that pt is easily agitated and "she is just not herself she was like this when she " "had her breakdown 20yrs ago" She reports that pt is "one minute she is in reality and other times she is not". Mother also notes that pt has been engaging in bizarre behavior of wrapping all her utensils with cloth "like she is performing a ritual". She also notes pt is also not taking her medications and the other day when pt came over to stay with parents down the street from where pt lives. She states that pt initially agreed to taking her medication but then became very agitated and threw the pill at her mother and started to yell at mother unprovoked. Mother also reports she is not sure if pts  is really cheated on pt because he always denied pts accusations and is unsure that whether pt maybe delusional. She does think pt needs to be hospitalized because pt maybe a danger to self or others in current mental state.      Hospital Course:     Unable to obtain full history from pt the following from chart review as pt was uncooperative with details  Psychiatric history: hospitalized 4 times for psychiatric reasons, last hospitalization was ~2016. Struggled with postpartum depression and was hospitalized. Has been seeing Dr. Whitehead for a couple years. Was previously seeing Dr. Rogers until he passed away. Saw a therapist in the past but it wasn't anything long term.      Medical history: anemia, pre-diabetes     Family history of psychiatric illness: mother - bipolar II     Social history (marriage, employment, etc.): Born and raised in [facility]. Reports that childhood was "average". 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 Parents are still . 1 brother, older. 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 5 children (20, 13, 10, 5, 3). Lives in home with  and children. Never arrested. Hobbies included baking, cooking, gardening, reading, and painting. SAHM,  works as  " (supervisor).     Substance use:   Alcohol: none   Drugs: none   Tobacco: none   Caffeine: coffee, occasional, unless she is not feeling well and then stays away because it makes sleep difficult.         Patient History           Medical as of 11/19/2019     Past Medical History     Diagnosis Date Comments Source    Bipolar 1 disorder -- -- Provider    GERD (gastroesophageal reflux disease) -- -- Provider    History of psychiatric hospitalization -- -- Provider    Hx of psychiatric care -- -- Provider    Psychiatric problem -- -- Provider    Seasonal allergies -- -- Provider    Therapy -- used to follow with Dr. Gerber Rogers Provider    Vaginal delivery -- x5 Provider          Pertinent Negatives     Diagnosis Date Noted Comments Source    Diabetes mellitus 01/22/2014 -- Provider    Hypertension 01/22/2014 -- Provider    Seizures 01/13/2017 -- Provider    Suicide attempt 01/13/2017 -- Provider    Thyroid disease 01/22/2014 -- Provider                  Surgical as of 11/19/2019     Past Surgical History     Procedure Laterality Date Comments Source    FINGER FRACTURE SURGERY -- -- -- Provider    REPAIR OF INCARCERATED VENTRAL HERNIA WITHOUT HISTORY OF PRIOR REPAIR N/A 7/23/2019 Procedure: REPAIR, HERNIA, VENTRAL, INCARCERATED, WITHOUT HISTORY OF PRIOR REPAIR;  Surgeon: Bandar Madden MD;  Location: [facility] OR;  Service: General;  Laterality: N/A; Provider                  Family as of 11/19/2019     Problem Relation Name Age of Onset Comments Source    Bipolar disorder Maternal Aunt -- -- -- Provider    Bipolar disorder Paternal Aunt -- -- -- Provider    Diabetes type II Mother -- -- -- Provider    Colon cancer Neg Hx -- -- -- Provider    Ovarian cancer Neg Hx -- -- -- Provider    Anxiety disorder Neg Hx -- -- -- Provider    Depression Neg Hx -- -- -- Provider    Suicide Neg Hx -- -- -- Provider            Tobacco Use as of 11/19/2019     Smoking Status Smoking Start Date Smoking Quit Date Packs/Day Years Used     Never Smoker -- -- -- --    Types Comments Smokeless Tobacco Status Smokeless Tobacco Quit Date Source    -- -- Never Used -- Provider            Alcohol Use as of 11/19/2019     Alcohol Use Drinks/Week Alcohol/Week Comments Source    No -- -- -- Provider, Patient    Frequency Standard Drinks Binge Drinking        Never  -- Never               Drug Use as of 11/19/2019     Drug Use Types Frequency Comments Source    Never -- -- -- Provider            Sexual Activity as of 11/19/2019     Sexually Active Birth Control Partners Comments Source    Yes None Male -- Provider            Activities of Daily Living as of 11/19/2019     Activities of Daily Living Question Response Comments Source    Patient feels they ought to cut down on drinking/drug use Not Asked -- Provider    Patient annoyed by others criticizing their drinking/drug use Not Asked -- Provider    Patient has felt bad or guilty about drinking/drug use Not Asked -- Provider    Patient has had a drink/used drugs as an eye opener in the AM Not Asked -- Provider            Social Documentation as of 11/19/2019    None           Occupational as of 11/19/2019     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            Socioeconomic as of 11/19/2019     Marital Status Spouse Name Number of Children Years Education Education Level Preferred Language Ethnicity Race Source     -- 5 -- -- English  () or Alaskan  Indian or , White Provider    Financial Resource Strain Food Insecurity: Worry Food Insecurity: Inability Transportation Needs: Medical Transportation Needs: Non-medical    Not very hard  Sometimes true  Sometimes true  No  No             Pertinent History     Question Response Comments    Lives with family  and 5 kids    Place in Birth Order -- --    Lives in home --    Number of Siblings -- --    Raised by biological parents grew up in [place] [facility]  Involvement none --    Childhood Trauma early trauma --    Criminal History of none --    Financial Status unemployed --    Highest Level of Education high school graduation [employer] for commercial food services and [employer] technology    Does patient have access to a firearm? No --     Service No --    Primary Leisure Activity other drinking coffee and tea; reading; phone games; cooking; painting    Spirituality non-practicing --        Past Medical History:   Diagnosis Date    Bipolar 1 disorder     GERD (gastroesophageal reflux disease)     History of psychiatric hospitalization     Hx of psychiatric care     Psychiatric problem     Seasonal allergies     Therapy     used to follow with Dr. Gerber Rogers    Vaginal delivery     x5     Past Surgical History:   Procedure Laterality Date    FINGER FRACTURE SURGERY      REPAIR OF INCARCERATED VENTRAL HERNIA WITHOUT HISTORY OF PRIOR REPAIR N/A 7/23/2019    Procedure: REPAIR, HERNIA, VENTRAL, INCARCERATED, WITHOUT HISTORY OF PRIOR REPAIR;  Surgeon: Bandar Madden MD;  Location: [facility];  Service: General;  Laterality: N/A;     Family History     Problem Relation (Age of Onset)    Bipolar disorder Maternal Aunt, Paternal Aunt    Diabetes type II Mother        Tobacco Use    Smoking status: Never Smoker    Smokeless tobacco: Never Used   Substance and Sexual Activity    Alcohol use: No     Frequency: Never     Binge frequency: Never    Drug use: Never    Sexual activity: Yes     Partners: Male     Birth control/protection: None     Review of patient's allergies indicates:   Allergen Reactions    Penicillins Hives and Shortness Of Breath    Morphine        No current facility-administered medications on file prior to encounter.      Current Outpatient Medications on File Prior to Encounter   Medication Sig    docusate sodium (COLACE) 100 MG capsule Take 100 mg by mouth once daily.    ferrous sulfate 325 (65 FE) MG EC tablet Take 325 mg by mouth 3  "(three) times daily with meals.    levocetirizine (XYZAL) 5 MG tablet Take 1 tablet (5 mg total) by mouth every evening.    methylPREDNISolone (MEDROL DOSEPACK) 4 mg tablet use as directed    multivitamin with minerals tablet Take 1 tablet by mouth once daily.    OLANZapine (ZYPREXA) 5 MG tablet Take 1 tablet (5 mg total) by mouth every evening.    omeprazole (PRILOSEC OTC) 20 MG tablet Take 1 tablet by mouth Daily.    ondansetron (ZOFRAN-ODT) 8 MG TbDL DISSOLVE 1 TABLET IN MOUTH EVERY 4 TO 6 HOURS AS NEEDED FOR NAUSEA    topiramate (TOPAMAX) 50 MG tablet Take 1 tablet (50 mg total) by mouth 2 (two) times daily.     Psychotherapeutics (From admission, onward)    None        Review of Systems   MEDICAL REVIEW OF SYSTEMS  unobtainable from patient due to mental status / lack of cooperation   Psychiatric: please see HPI      Strengths and Liabilities: Strength: Patient has positive support network., Liability: Patient is defensive., Liability: Patient is hostile., Liability: Patient is impulsive., Liability: Patient has poor judgment    Objective:     Vital Signs (Most Recent):  Temp: 98.2 °F (36.8 °C) (11/19/19 1622)  Pulse: 98 (11/19/19 1622)  Resp: 16 (11/19/19 1622)  BP: (!) 151/78 (11/19/19 1622)  SpO2: 100 % (11/19/19 1326) Vital Signs (24h Range):  Temp:  [97.4 °F (36.3 °C)-98.5 °F (36.9 °C)] 97.4 °F (36.3 °C)  Pulse:  [] 89  Resp:  [16-18] 16  SpO2:  [100 %] 100 %  BP: (136-161)/(78-92) 136/91     Height: 5' 5.5" (166.4 cm)  Weight: 103.7 kg (228 lb 9.9 oz)  Body mass index is 37.47 kg/m².    No intake or output data in the 24 hours ending 11/19/19 4760    Physical Exam   Mental Status Exam:  Appearance: disheveled, obese  Behavior/Cooperation: limited,  uncooperative, hostile, psychomotor agitation, restless and fidgety   Speech: appropriate rate, volume and tone normal tone, normal pitch, loud, profane  Language: uses words appropriately; NO aphasia or dysarthria  Mood: "fine ok"  Affect:  Angry " irritable but then laughs loudly and later starts crying  Thought Process: racing, tangential, illogical  Thought Content: delusions: yes, hallucinations: (auditory: no, visual: no, illusions: yes), suicidal thoughts: (active-no, passive-no), homicidal thoughts: (active-no, passive-no), paranoid  Level of Consciousness: Alert and Oriented x3  Memory:  Limited unable to assess  Attention/concentration: limited   Fund of Knowledge: appears adequate  Insight:  Impaired  Judgment: Impaired       Significant Labs: All pertinent labs within the past 24 hours have been reviewed.    Significant Imaging: I have reviewed all pertinent imaging results/findings within the past 24 hours.    Assessment/Plan:     Bipolar disorder, current episode manic severe with psychotic features  IMPRESSION:   BIPOLAR II d/o- Acute walt  H/o postpartum depression ( 3 yrs ago)     RECOMMENDATIONS:      DISPOSITION- Once medically cleared;   · Seek Involuntary Inpt psychiatric admission for stabilization of acute psychiatric symptoms. PLEASE call [facility] inpt psych unit for placement.      PSYCHIATRIC MEDICATIONS  · Scheduled- resume Zyprexa 5mg qhs  · PRN Zyprexa 5mg q6 PO/IM for non redirectable agitation ; do not combine or administer within one hour of giving a Benzodiazepine       LEGAL-  · Seek/continue PEC because pt is in imminent danger of hurting self or others and is gravely disabled. Please provide with 1:1 sitter and concern for violence  ·    OTHER  · Consulting clinician was informed of the encounter and consult note.  · Please re-consult for further follow up or reassessment    · More than 50% of the time was spent counseling/coordinating care       Total Time:  60 minutes      Krystyna Draper MD   Psychiatry  [facility] ED

## 2019-11-20 NOTE — SUBJECTIVE & OBJECTIVE
"Unable to obtain full history from pt the following from chart review as pt was uncooperative with details  Psychiatric history: hospitalized 4 times for psychiatric reasons, last hospitalization was ~2016. Struggled with postpartum depression and was hospitalized. Has been seeing Dr. Whitehead for a couple years. Was previously seeing Dr. Rogers until he passed away. Saw a therapist in the past but it wasn't anything long term.      Medical history: anemia, pre-diabetes     Family history of psychiatric illness: mother - bipolar II     Social history (marriage, employment, etc.): Born and raised in [facility]. Reports that childhood was "average". 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 Parents are still . 1 brother, older. 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 5 children (20, 13, 10, 5, 3). Lives in home with  and children. Never arrested. Hobbies included baking, cooking, gardening, reading, and painting. SAHM,  works as  (supervisor).     Substance use:   Alcohol: none   Drugs: none   Tobacco: none   Caffeine: coffee, occasional, unless she is not feeling well and then stays away because it makes sleep difficult.         Patient History           Medical as of 11/19/2019     Past Medical History     Diagnosis Date Comments Source    Bipolar 1 disorder -- -- Provider    GERD (gastroesophageal reflux disease) -- -- Provider    History of psychiatric hospitalization -- -- Provider    Hx of psychiatric care -- -- Provider    Psychiatric problem -- -- Provider    Seasonal allergies -- -- Provider    Therapy -- used to follow with Dr. Gerber Rogers Provider    Vaginal delivery -- x5 Provider          Pertinent Negatives     Diagnosis Date Noted Comments Source    Diabetes mellitus 01/22/2014 -- Provider    Hypertension 01/22/2014 -- Provider    Seizures 01/13/2017 -- Provider    Suicide " attempt 01/13/2017 -- Provider    Thyroid disease 01/22/2014 -- Provider                  Surgical as of 11/19/2019     Past Surgical History     Procedure Laterality Date Comments Source    FINGER FRACTURE SURGERY -- -- -- Provider    REPAIR OF INCARCERATED VENTRAL HERNIA WITHOUT HISTORY OF PRIOR REPAIR N/A 7/23/2019 Procedure: REPAIR, HERNIA, VENTRAL, INCARCERATED, WITHOUT HISTORY OF PRIOR REPAIR;  Surgeon: Bandar Madden MD;  Location: [facility] OR;  Service: General;  Laterality: N/A; Provider                  Family as of 11/19/2019     Problem Relation Name Age of Onset Comments Source    Bipolar disorder Maternal Aunt -- -- -- Provider    Bipolar disorder Paternal Aunt -- -- -- Provider    Diabetes type II Mother -- -- -- Provider    Colon cancer Neg Hx -- -- -- Provider    Ovarian cancer Neg Hx -- -- -- Provider    Anxiety disorder Neg Hx -- -- -- Provider    Depression Neg Hx -- -- -- Provider    Suicide Neg Hx -- -- -- Provider            Tobacco Use as of 11/19/2019     Smoking Status Smoking Start Date Smoking Quit Date Packs/Day Years Used    Never Smoker -- -- -- --    Types Comments Smokeless Tobacco Status Smokeless Tobacco Quit Date Source    -- -- Never Used -- Provider            Alcohol Use as of 11/19/2019     Alcohol Use Drinks/Week Alcohol/Week Comments Source    No -- -- -- Provider, Patient    Frequency Standard Drinks Binge Drinking        Never  -- Never               Drug Use as of 11/19/2019     Drug Use Types Frequency Comments Source    Never -- -- -- Provider            Sexual Activity as of 11/19/2019     Sexually Active Birth Control Partners Comments Source    Yes None Male -- Provider            Activities of Daily Living as of 11/19/2019     Activities of Daily Living Question Response Comments Source    Patient feels they ought to cut down on drinking/drug use Not Asked -- Provider    Patient annoyed by others criticizing their drinking/drug use Not Asked -- Provider    Patient  has felt bad or guilty about drinking/drug use Not Asked -- Provider    Patient has had a drink/used drugs as an eye opener in the AM Not Asked -- Provider            Social Documentation as of 11/19/2019    None           Occupational as of 11/19/2019     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            Socioeconomic as of 11/19/2019     Marital Status Spouse Name Number of Children Years Education Education Level Preferred Language Ethnicity Race Source     -- 5 -- -- English  () or Alaskan  Indian or , White Provider    Financial Resource Strain Food Insecurity: Worry Food Insecurity: Inability Transportation Needs: Medical Transportation Needs: Non-medical    Not very hard  Sometimes true  Sometimes true  No  No             Pertinent History     Question Response Comments    Lives with family  and 5 kids    Place in Birth Order -- --    Lives in home --    Number of Siblings -- --    Raised by biological parents grew up in [place]    Legal Involvement none --    Childhood Trauma early trauma --    Criminal History of none --    Financial Status unemployed --    Highest Level of Education high school graduation [employer] for commercial food services and [employer] technology    Does patient have access to a firearm? No --     Service No --    Primary Leisure Activity other drinking coffee and tea; reading; phone games; cooking; painting    Spirituality non-practicing --        Past Medical History:   Diagnosis Date    Bipolar 1 disorder     GERD (gastroesophageal reflux disease)     History of psychiatric hospitalization     Hx of psychiatric care     Psychiatric problem     Seasonal allergies     Therapy     used to follow with Dr. Gerber Rogers    Vaginal delivery     x5     Past Surgical History:   Procedure Laterality Date    FINGER FRACTURE SURGERY      REPAIR OF INCARCERATED VENTRAL HERNIA  WITHOUT HISTORY OF PRIOR REPAIR N/A 7/23/2019    Procedure: REPAIR, HERNIA, VENTRAL, INCARCERATED, WITHOUT HISTORY OF PRIOR REPAIR;  Surgeon: Bandar Madden MD;  Location: [facility] OR;  Service: General;  Laterality: N/A;     Family History     Problem Relation (Age of Onset)    Bipolar disorder Maternal Aunt, Paternal Aunt    Diabetes type II Mother        Tobacco Use    Smoking status: Never Smoker    Smokeless tobacco: Never Used   Substance and Sexual Activity    Alcohol use: No     Frequency: Never     Binge frequency: Never    Drug use: Never    Sexual activity: Yes     Partners: Male     Birth control/protection: None     Review of patient's allergies indicates:   Allergen Reactions    Penicillins Hives and Shortness Of Breath    Morphine        No current facility-administered medications on file prior to encounter.      Current Outpatient Medications on File Prior to Encounter   Medication Sig    docusate sodium (COLACE) 100 MG capsule Take 100 mg by mouth once daily.    ferrous sulfate 325 (65 FE) MG EC tablet Take 325 mg by mouth 3 (three) times daily with meals.    levocetirizine (XYZAL) 5 MG tablet Take 1 tablet (5 mg total) by mouth every evening.    methylPREDNISolone (MEDROL DOSEPACK) 4 mg tablet use as directed    multivitamin with minerals tablet Take 1 tablet by mouth once daily.    OLANZapine (ZYPREXA) 5 MG tablet Take 1 tablet (5 mg total) by mouth every evening.    omeprazole (PRILOSEC OTC) 20 MG tablet Take 1 tablet by mouth Daily.    ondansetron (ZOFRAN-ODT) 8 MG TbDL DISSOLVE 1 TABLET IN MOUTH EVERY 4 TO 6 HOURS AS NEEDED FOR NAUSEA    topiramate (TOPAMAX) 50 MG tablet Take 1 tablet (50 mg total) by mouth 2 (two) times daily.     Psychotherapeutics (From admission, onward)    None        Review of Systems   MEDICAL REVIEW OF SYSTEMS  unobtainable from patient due to mental status / lack of cooperation   Psychiatric: please see HPI      Strengths and Liabilities: Strength:  "Patient has positive support network., Liability: Patient is defensive., Liability: Patient is hostile., Liability: Patient is impulsive., Liability: Patient has poor judgment    Objective:     Vital Signs (Most Recent):  Temp: 98.2 °F (36.8 °C) (11/19/19 1622)  Pulse: 98 (11/19/19 1622)  Resp: 16 (11/19/19 1622)  BP: (!) 151/78 (11/19/19 1622)  SpO2: 100 % (11/19/19 1326) Vital Signs (24h Range):  Temp:  [97.4 °F (36.3 °C)-98.5 °F (36.9 °C)] 97.4 °F (36.3 °C)  Pulse:  [] 89  Resp:  [16-18] 16  SpO2:  [100 %] 100 %  BP: (136-161)/(78-92) 136/91     Height: 5' 5.5" (166.4 cm)  Weight: 103.7 kg (228 lb 9.9 oz)  Body mass index is 37.47 kg/m².    No intake or output data in the 24 hours ending 11/19/19 2223    Physical Exam   Mental Status Exam:  Appearance: disheveled, obese  Behavior/Cooperation: limited,  uncooperative, hostile, psychomotor agitation, restless and fidgety   Speech: appropriate rate, volume and tone normal tone, normal pitch, loud, profane  Language: uses words appropriately; NO aphasia or dysarthria  Mood: "fine ok"  Affect:  Angry irritable but then laughs loudly and later starts crying  Thought Process: racing, tangential, illogical  Thought Content: delusions: yes, hallucinations: (auditory: no, visual: no, illusions: yes), suicidal thoughts: (active-no, passive-no), homicidal thoughts: (active-no, passive-no), paranoid  Level of Consciousness: Alert and Oriented x3  Memory:  Limited unable to assess  Attention/concentration: limited   Fund of Knowledge: appears adequate  Insight:  Impaired  Judgment: Impaired       Significant Labs: All pertinent labs within the past 24 hours have been reviewed.    Significant Imaging: I have reviewed all pertinent imaging results/findings within the past 24 hours.  "

## 2019-11-20 NOTE — ASSESSMENT & PLAN NOTE
IMPRESSION:   BIPOLAR II d/o- Acute walt  H/o postpartum depression ( 3 yrs ago)     RECOMMENDATIONS:      DISPOSITION- Once medically cleared;   · Seek Involuntary Inpt psychiatric admission for stabilization of acute psychiatric symptoms. PLEASE call [facility] inpt psych unit for placement.      PSYCHIATRIC MEDICATIONS  · Scheduled- resume Zyprexa 5mg qhs  · PRN Zyprexa 5mg q6 PO/IM for non redirectable agitation ; do not combine or administer within one hour of giving a Benzodiazepine       LEGAL-  · Seek/continue PEC because pt is in imminent danger of hurting self or others and is gravely disabled. Please provide with 1:1 sitter and concern for violence  ·    OTHER  · Consulting clinician was informed of the encounter and consult note.  · Please re-consult for further follow up or reassessment    · More than 50% of the time was spent counseling/coordinating care

## 2019-11-21 LAB
BACTERIA UR CULT: NORMAL
BACTERIA UR CULT: NORMAL

## 2019-12-12 ENCOUNTER — OFFICE VISIT (OUTPATIENT)
Dept: PSYCHIATRY | Facility: CLINIC | Age: 44
End: 2019-12-12
Payer: COMMERCIAL

## 2019-12-12 DIAGNOSIS — F31.61 BIPOLAR 1 DISORDER, MIXED, MILD: Primary | ICD-10-CM

## 2019-12-12 PROCEDURE — 90834 PR PSYCHOTHERAPY W/PATIENT, 45 MIN: ICD-10-PCS | Mod: S$GLB,,, | Performed by: SOCIAL WORKER

## 2019-12-12 PROCEDURE — 90834 PSYTX W PT 45 MINUTES: CPT | Mod: S$GLB,,, | Performed by: SOCIAL WORKER

## 2019-12-12 NOTE — PROGRESS NOTES
"Individual Psychotherapy (PhD/LCSW)    12/12/2019    Site:  [facility]         Therapeutic Intervention: Met with patient.  Outpatient - Insight oriented psychotherapy 45 min - CPT code 82747 and Outpatient - Supportive psychotherapy 45 min - CPT Code 24419    Chief complaint/reason for encounter: depression, mood swings, anxiety and psychosis     Interval history and content of current session: Patient returned to clinic for follow up psychotherapy. Patient states that she is doing better. Patient recently hospitalized. Reports that she was feeling erratic and manic. States that she felt like her family was smothering her. Wasn't able to get out by herself and felt overwhelmed with her responsibilities. States that at the time she was going on 2 week with limited sleep. Reports that she continues to have marriage problems. States that the couple has stopped going to couples counseling,  said he wasn't going to session anymore. States that she doesn't know if marriage is going to work.  continues to hide things. Found out that while she was in the hospital he pulled money out of their saving account several times and won't tell her. He is hiding his smoking (cigarettes and possibly marijuana) and drinking. Reports that she is trying to coparent through [facility] and then will make a decision about . States that she feels like marriage is a lot of her stress. Everything is in patient's name and they have ruined her credit. States that this is a huge burden on her. Reports that at times she also feels like her  is manipulative. Has said things around the children like "you're delusional" and they have started to say similar things. Reports that she is compliant on medication. Reports that she recognizes that she needs to work on hobbies and taking better care of herself. Patient extremely tangential during session.     Treatment plan:  · Target symptoms: " depression, anxiety , mood disorder, psychosis  · Why chosen therapy is appropriate versus another modality: relevant to diagnosis, evidence based practice  · Outcome monitoring methods: self-report, observation  · Therapeutic intervention type: insight oriented psychotherapy, supportive psychotherapy    Risk parameters:  Patient reports no suicidal ideation  Patient reports no homicidal ideation  Patient reports no self-injurious behavior  Patient reports no violent behavior    Verbal deficits: None    Patient's response to intervention:  The patient's response to intervention is accepting.    Progress toward goals and other mental status changes:  The patient's progress toward goals is fair .    Diagnosis:     ICD-10-CM ICD-9-CM   1. Bipolar 1 disorder, mixed, mild F31.61 296.61       Plan:  individual psychotherapy and medication management by physician    Return to clinic: 2 weeks, 1 month    Length of Service (minutes): 45     Cassandra Rockweiler, LCSW-BACS

## 2019-12-31 ENCOUNTER — PATIENT MESSAGE (OUTPATIENT)
Dept: PSYCHIATRY | Facility: CLINIC | Age: 44
End: 2019-12-31

## 2020-01-09 ENCOUNTER — OFFICE VISIT (OUTPATIENT)
Dept: PSYCHIATRY | Facility: CLINIC | Age: 45
End: 2020-01-09
Payer: COMMERCIAL

## 2020-01-09 VITALS
BODY MASS INDEX: 38.83 KG/M2 | DIASTOLIC BLOOD PRESSURE: 70 MMHG | WEIGHT: 241.63 LBS | SYSTOLIC BLOOD PRESSURE: 122 MMHG | HEART RATE: 90 BPM | HEIGHT: 66 IN

## 2020-01-09 DIAGNOSIS — F31.73 BIPOLAR I DISORDER, CURRENT OR MOST RECENT EPISODE MANIC, IN PARTIAL REMISSION: Primary | ICD-10-CM

## 2020-01-09 DIAGNOSIS — F43.23 ADJUSTMENT DISORDER WITH MIXED ANXIETY AND DEPRESSED MOOD: ICD-10-CM

## 2020-01-09 PROCEDURE — 99214 PR OFFICE/OUTPT VISIT, EST, LEVL IV, 30-39 MIN: ICD-10-PCS | Mod: S$GLB,,, | Performed by: PSYCHIATRY & NEUROLOGY

## 2020-01-09 PROCEDURE — 3008F PR BODY MASS INDEX (BMI) DOCUMENTED: ICD-10-PCS | Mod: CPTII,S$GLB,, | Performed by: PSYCHIATRY & NEUROLOGY

## 2020-01-09 PROCEDURE — 99999 PR PBB SHADOW E&M-EST. PATIENT-LVL III: CPT | Mod: PBBFAC,,, | Performed by: PSYCHIATRY & NEUROLOGY

## 2020-01-09 PROCEDURE — 99999 PR PBB SHADOW E&M-EST. PATIENT-LVL III: ICD-10-PCS | Mod: PBBFAC,,, | Performed by: PSYCHIATRY & NEUROLOGY

## 2020-01-09 PROCEDURE — 3008F BODY MASS INDEX DOCD: CPT | Mod: CPTII,S$GLB,, | Performed by: PSYCHIATRY & NEUROLOGY

## 2020-01-09 PROCEDURE — 99214 OFFICE O/P EST MOD 30 MIN: CPT | Mod: S$GLB,,, | Performed by: PSYCHIATRY & NEUROLOGY

## 2020-01-09 RX ORDER — FLUOXETINE 10 MG/1
10 CAPSULE ORAL DAILY
Qty: 90 CAPSULE | Refills: 1 | Status: SHIPPED | OUTPATIENT
Start: 2020-01-09 | End: 2020-04-07

## 2020-01-09 RX ORDER — TOPIRAMATE 50 MG/1
50 TABLET, FILM COATED ORAL 2 TIMES DAILY
Qty: 180 TABLET | Refills: 1 | Status: SHIPPED | OUTPATIENT
Start: 2020-01-09 | End: 2020-04-07 | Stop reason: SDUPTHER

## 2020-01-09 RX ORDER — OLANZAPINE 5 MG/1
5 TABLET ORAL NIGHTLY
Qty: 90 TABLET | Refills: 1 | Status: SHIPPED | OUTPATIENT
Start: 2020-01-09 | End: 2020-03-20 | Stop reason: SDUPTHER

## 2020-01-09 NOTE — PROGRESS NOTES
Outpatient Psychiatry Follow-Up Visit (MD/NP)    1/9/2020    Clinical Status of Patient:  Outpatient (Ambulatory)    Chief Complaint:  Zonia Skaggs is a 44 y.o. female who presents today for follow-up of mood disorder.  Met with patient.      Interval History and Content of Current Session:  Interim Events/Subjective Report/Content of Current Session: Patient Zonia Skaggs presents to clinic for follow up.  She went to an inpatient psychiatric facility in November of 2019 after her daughter called up and said that she was suicidal.  Daughter is awaiting to go active duty in a couple of months and patient is somewhat anxious about this.  Also has a lot of financial problems and stress in her relationship.  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  Feel as if her energy is wearing out and she is crashing at 3:00 p.m..  Occasionally having some dizzy spells but has not experienced any in the past 3 days.  Upset that her  is smoking again.  She does not let him smoke in the house.  Mood has been good overall.  In the hospital she was prescribed olanzapine 15 mg nightly but has cut herself back and only taking 5 mg every 2 or 3 nights.  She does not want to be on a regular medicine.  She denies any hallucinations or paranoia.  No manic symptoms since getting out of the hospital.  Also not taking her Prozac because she says that the pharmacy did not have any.  She is asking to get back on topiramate which she feels helped her anxiety and mood in the past.  Continues to see therapist on a regular basis.  She is noted to have had some weight gain over the past few months.    Psychotherapy:  · Target symptoms: mood disorder  · Why chosen therapy is appropriate versus another modality: relevant to diagnosis  · Outcome monitoring methods: self-report, observation  · Therapeutic intervention type: supportive psychotherapy  · Topics discussed/themes: building skills sets for symptom management, symptom recognition  · The  "patient's response to the intervention is accepting. The patient's progress toward treatment goals is limited.   · Duration of intervention: 15 minutes.    Review of Systems   · PSYCHIATRIC: Pertinant items are noted in the narrative.  · CONSTITUTIONAL: Positive for weight gain.   · MUSCULOSKELETAL: No pain or stiffness of the joints.  · NEUROLOGIC: No weakness, sensory changes, seizures, confusion, memory loss, tremor or other abnormal movements.  · RESPIRATORY: No shortness of breath.  · CARDIOVASCULAR: No tachycardia or chest pain.  · GASTROINTESTINAL: No nausea, vomiting, pain, constipation or diarrhea.    Past Medical, Family and Social History: The patient's past medical, family and social history have been reviewed and updated as appropriate within the electronic medical record - see encounter notes.    Compliance:  Not fully compliant with medications because she does not want to take medications daily.    Side effects: None    Risk Parameters:  Patient reports no suicidal ideation  Patient reports no homicidal ideation  Patient reports no self-injurious behavior  Patient reports no violent behavior    Exam (detailed: at least 9 elements; comprehensive: all 15 elements)   Constitutional  Vitals:  Most recent vital signs, dated less than 90 days prior to this appointment, were reviewed.   Vitals:    01/09/20 0827   BP: 122/70   Pulse: 90   Weight: 109.6 kg (241 lb 10 oz)   Height: 5' 6" (1.676 m)        General:  age appropriate, overweight     Musculoskeletal  Muscle Strength/Tone:  no tremor, no tic   Gait & Station:  non-ataxic     Psychiatric  Speech:  no latency; no press   Mood & Affect:  anxious  anxious   Thought Process:  normal and logical   Associations:  intact   Thought Content:  normal, no suicidality, no homicidality, delusions, or paranoia   Insight:  has awareness of illness   Judgement: behavior is adequate to circumstances   Orientation:  person, place, situation, time/date   Memory: intact " for content of interview   Language: grossly intact   Attention Span & Concentration:  able to focus   Fund of Knowledge:  intact and appropriate to age and level of education     Assessment and Diagnosis   Status/Progress: Based on the examination today, the patient's problem(s) is/are inadequately controlled.  New problems have been presented today.   Co-morbidities are complicating management of the primary condition.  There are no active rule-out diagnoses for this patient at this time.     General Impression: We will continue pharmacological intervention and adjunctive therapy.       ICD-10-CM ICD-9-CM   1. Bipolar I disorder, current or most recent episode manic, in partial remission F31.73 296.45   2. Adjustment disorder with mixed anxiety and depressed mood F43.23 309.28       Intervention/Counseling/Treatment Plan   · Medication Management: Continue current medications. The risks and benefits of medication were discussed with the patient.  · Counseling provided with patient as follows: importance of compliance with chosen treatment options was emphasized, risks and benefits of treatment options, including medications, were discussed with the patient, risk factor reduction, prognosis, patient education, instructions for  management, treatment and follow-up were reviewed  1.  Restarted and Urged to be compliant with taking b.i.d. topiramate 50mg tapering BID targeting mood stabilization if she would like to start.  Warned of risk of word finding difficulties.  She does not have to take the dose at bedtime but yet take the 2nd dose in the middle of the day.  2.  Continue an urge compliance with daily use of olanzapine 5 mg p.o. nightly p.r.n. anxiety, elevated state, hypomania, sleep problems.  Warned of risk of TD, EPS, metabolic syndrome.  3.  Told patient to limit stressors in life and start some form of meditation to help with stress management.  Asked her to get established again with individual  therapy.  4.  I fear that patient may have an exacerbation of illness if she does not maintain compliance with medications.    Return to Clinic: 3 months, as needed

## 2020-01-09 NOTE — PATIENT INSTRUCTIONS
"        You have been provided with a certain amount of medication with a specified number of refills.  Please follow up within an adequate time before you run out of medications.    REFILLS FOR CONTROLLED SUBSTANCES WILL NOT BE GIVEN WITHOUT AN APPOINTMENT.  I will not honor or fill automated refill requests from pharmacies.  You must come in for an appointment to get refills.        Please book your next appointment for myself or therapist by phone by calling our office at [phone].        Note that these follow up appointments are 20 minutes long.  It is important that we focus on medication management.  Should you need therapy, please get set up with our therapist or call your insurance company to find out which therapists are available in your area.      PLEASE BE AT LEAST 15 MINUTES EARLY FOR YOUR NEXT APPOINTMENT.  Late arrivals WILL BE TURNED AWAY AND ASKED TO RESCHEDULE.  YOU MUST COME EARLY TO ALLOW TIME FOR CHECK-IN AS WELL AS GET YOUR VITAL SIGNS AND GO OVER YOUR MEDICATIONS.  T[facility]iness is not fair to the patients who present after you and are on time for their appointments.  It causes a delay in the appointments for patients and staff.  YOU MAY ALSO BE DISCHARGED FROM CLINIC with multiple late arrivals or "No Show" appointments.       -----------------------------------------------------------------------------------------------------------------  IF YOU FEEL SUICIDAL OR HAVING THOUGHTS OR PLANS TO HURT YOURSELF OR OTHERS, CALL 911 OR REPORT TO THE NEAREST EMERGENCY ROOM.  YOU CAN ALSO ACCESS THE FOLLOWING HOTLINE:    National Suicide Hotline Number 5-315-732-TALK (9928)                  "

## 2020-01-17 ENCOUNTER — PATIENT MESSAGE (OUTPATIENT)
Dept: PSYCHIATRY | Facility: CLINIC | Age: 45
End: 2020-01-17

## 2020-01-27 ENCOUNTER — OFFICE VISIT (OUTPATIENT)
Dept: PSYCHIATRY | Facility: CLINIC | Age: 45
End: 2020-01-27
Payer: COMMERCIAL

## 2020-01-27 DIAGNOSIS — F31.61 BIPOLAR 1 DISORDER, MIXED, MILD: Primary | ICD-10-CM

## 2020-01-27 PROCEDURE — 90834 PSYTX W PT 45 MINUTES: CPT | Mod: S$GLB,,, | Performed by: SOCIAL WORKER

## 2020-01-27 PROCEDURE — 90834 PR PSYCHOTHERAPY W/PATIENT, 45 MIN: ICD-10-PCS | Mod: S$GLB,,, | Performed by: SOCIAL WORKER

## 2020-01-27 NOTE — PROGRESS NOTES
"Individual Psychotherapy (PhD/LCSW)    1/27/2020    Site:  [facility]         Therapeutic Intervention: Met with patient.  Outpatient - Insight oriented psychotherapy 45 min - CPT code 21971 and Outpatient - Supportive psychotherapy 45 min - CPT Code 90774    Chief complaint/reason for encounter: depression, mood swings, anxiety and psychosis     Interval history and content of current session: Patient returned to clinic for follow up psychotherapy. Patient states that she is doing "okay". Reports that she is still taking medication. Was not feeling the best and Dr. Whitehead told her to increase the topamax. States that she hasn't done it yet because she doesn't want to be drowsy during the day. States that she found out recently that her daughter dropped out of college. Daughter is planning on going into the  and that makes patient a little anxious. Reports that daughter told her that she should apply for disability. States that she started the paperwork but didn't finish. She got a letter about how she doesn't qualify because she didn't work enough. Has been thinking about sending son's back to school. States that homeschooling is a lot of pressure on her and that they both require a lot of attention to stay focused. Doesn't want to send them to a public school because she doesn't think it is the best environment. Reports that she has been doing her gratitude journal. Finds that at times she is writing the same things. Discussed ways of changing it and how they don't have to be "big" things but rather smaller/simpler things. Discussed that  and her have a plan of moving in the future. Want to save money and move [place] different [place]. Discussed that they want to go on vacation and pay off credit card bills before moving.  also got set up with  so that made her happy.     Treatment plan:  · Target symptoms: depression, anxiety , mood disorder, " psychosis  · Why chosen therapy is appropriate versus another modality: relevant to diagnosis, evidence based practice  · Outcome monitoring methods: self-report, observation  · Therapeutic intervention type: insight oriented psychotherapy, supportive psychotherapy    Risk parameters:  Patient reports no suicidal ideation  Patient reports no homicidal ideation  Patient reports no self-injurious behavior  Patient reports no violent behavior    Verbal deficits: None    Patient's response to intervention:  The patient's response to intervention is accepting.    Progress toward goals and other mental status changes:  The patient's progress toward goals is fair .    Diagnosis:     ICD-10-CM ICD-9-CM   1. Bipolar 1 disorder, mixed, mild F31.61 296.61       Plan:  individual psychotherapy and medication management by physician    Return to clinic: 2 weeks, 1 month    Length of Service (minutes): 45     Cassandra Rockweiler, LCSW-BACS

## 2020-01-28 ENCOUNTER — PATIENT MESSAGE (OUTPATIENT)
Dept: PSYCHIATRY | Facility: CLINIC | Age: 45
End: 2020-01-28

## 2020-01-29 ENCOUNTER — PATIENT MESSAGE (OUTPATIENT)
Dept: PSYCHIATRY | Facility: CLINIC | Age: 45
End: 2020-01-29

## 2020-02-10 ENCOUNTER — OFFICE VISIT (OUTPATIENT)
Dept: PSYCHIATRY | Facility: CLINIC | Age: 45
End: 2020-02-10
Payer: COMMERCIAL

## 2020-02-10 DIAGNOSIS — F31.61 BIPOLAR 1 DISORDER, MIXED, MILD: Primary | ICD-10-CM

## 2020-02-10 PROCEDURE — 90834 PR PSYCHOTHERAPY W/PATIENT, 45 MIN: ICD-10-PCS | Mod: S$GLB,,, | Performed by: SOCIAL WORKER

## 2020-02-10 PROCEDURE — 90834 PSYTX W PT 45 MINUTES: CPT | Mod: S$GLB,,, | Performed by: SOCIAL WORKER

## 2020-02-10 NOTE — PROGRESS NOTES
"Individual Psychotherapy (PhD/LCSW)    2/10/2020    Site:  [facility]         Therapeutic Intervention: Met with patient.  Outpatient - Insight oriented psychotherapy 45 min - CPT code 93805 and Outpatient - Supportive psychotherapy 45 min - CPT Code 89277    Chief complaint/reason for encounter: depression, mood swings, anxiety and psychosis     Interval history and content of current session: Patient returned to clinic for follow up psychotherapy. Patient states that she is doing "okay". Reports that she is taking topamax 2x in the morning like she was told to do by psychiatrist. Increased medication she has noticed that she feels better. Also has started taking some supplements like Omega 3, elderberry, and collegen. Is trying to take better care of herself but still struggles to find the time with all the children. States that she is still sleeping with two younger children. Has made comments to  about using tax money to get bunk beds and start having them sleep in there. States that she also wants to get youngest potty trained but because he is between her and her mother's she needs mother's help and she doesn't seem to think it's important. States that she doesn't think her mother values her time or opinion. States that she worries that if she tells her mother that she needs help potty training then she will take it as a slight against her. Discussed boundaries with children and mother. Discussed ways of communicating to get people to help her in the ways she needs.     Treatment plan:  · Target symptoms: depression, anxiety , mood disorder, psychosis  · Why chosen therapy is appropriate versus another modality: relevant to diagnosis, evidence based practice  · Outcome monitoring methods: self-report, observation  · Therapeutic intervention type: insight oriented psychotherapy, supportive psychotherapy    Risk parameters:  Patient reports no suicidal ideation  Patient reports no " homicidal ideation  Patient reports no self-injurious behavior  Patient reports no violent behavior    Verbal deficits: None    Patient's response to intervention:  The patient's response to intervention is accepting.    Progress toward goals and other mental status changes:  The patient's progress toward goals is fair .    Diagnosis:     ICD-10-CM ICD-9-CM   1. Bipolar 1 disorder, mixed, mild F31.61 296.61       Plan:  individual psychotherapy and medication management by physician    Return to clinic: 2 weeks, 1 month    Length of Service (minutes): 45     Cassandra Rockweiler, LCSW-BACS

## 2020-03-20 ENCOUNTER — PATIENT MESSAGE (OUTPATIENT)
Dept: PSYCHIATRY | Facility: CLINIC | Age: 45
End: 2020-03-20

## 2020-03-20 RX ORDER — OLANZAPINE 5 MG/1
5 TABLET ORAL NIGHTLY
Qty: 90 TABLET | Refills: 1 | Status: SHIPPED | OUTPATIENT
Start: 2020-03-20 | End: 2020-04-07 | Stop reason: SDUPTHER

## 2020-03-20 RX ORDER — OLANZAPINE 5 MG/1
5 TABLET ORAL NIGHTLY
Qty: 90 TABLET | Refills: 1 | Status: SHIPPED | OUTPATIENT
Start: 2020-03-20 | End: 2020-04-07

## 2020-03-22 ENCOUNTER — PATIENT MESSAGE (OUTPATIENT)
Dept: PSYCHIATRY | Facility: CLINIC | Age: 45
End: 2020-03-22

## 2020-04-01 ENCOUNTER — OFFICE VISIT (OUTPATIENT)
Dept: PSYCHIATRY | Facility: CLINIC | Age: 45
End: 2020-04-01
Payer: COMMERCIAL

## 2020-04-01 DIAGNOSIS — F31.61 BIPOLAR 1 DISORDER, MIXED, MILD: Primary | ICD-10-CM

## 2020-04-01 PROCEDURE — 90834 PR PSYCHOTHERAPY W/PATIENT, 45 MIN: ICD-10-PCS | Mod: 95,,, | Performed by: SOCIAL WORKER

## 2020-04-01 PROCEDURE — 90834 PSYTX W PT 45 MINUTES: CPT | Mod: 95,,, | Performed by: SOCIAL WORKER

## 2020-04-02 NOTE — PROGRESS NOTES
"The patient location is: [facility]  The chief complaint leading to consultation is: depression, anxiety, bipolar  Visit type: Virtual visit with synchronous audio and video  Total time spent with patient: 45 minutes  Each patient to whom he or she provides medical services by telemedicine is:  (1) informed of the relationship between the physician and patient and the respective role of any other health care provider with respect to management of the patient; and (2) notified that he or she may decline to receive medical services by telemedicine and may withdraw from such care at any time.    Notes: Patient returns for psychotherapy. States that things have been "crazy" due to COVID-19. States that parents are not watching the children anymore because they are scared of getting sick.  continues to work and parents think that he could get exposed and then expose them. States that they were very helpful with watching the younger children so that she could do school work with older children. Also struggling with middle son. Has always struggled with school and during the day struggles to stay focused. States that now he is trying to "egg" on other children. Has been talking to friend almost everyday to help them stay "sane" together. States that her mood is "okay". Feels at times overwhelmed with parents paranoia. States that she also worries about income. They had to redo income tax so they aren't sure if they will get a refund or not. A refund would help them get through this time with little issues. States that she is still writing in her gratitude journal. Discussed that she needs to work on her sleep. Going to talk to her psychiatrist about medication and how it makes her feel drowsy. Discussed self care.     Encounter Diagnosis   Name Primary?    Bipolar 1 disorder, mixed, mild Yes       "

## 2020-04-07 ENCOUNTER — OFFICE VISIT (OUTPATIENT)
Dept: PSYCHIATRY | Facility: CLINIC | Age: 45
End: 2020-04-07
Payer: COMMERCIAL

## 2020-04-07 DIAGNOSIS — F43.23 ADJUSTMENT DISORDER WITH MIXED ANXIETY AND DEPRESSED MOOD: ICD-10-CM

## 2020-04-07 DIAGNOSIS — F31.77 BIPOLAR I DISORDER, MOST RECENT EPISODE MIXED, IN PARTIAL REMISSION: Primary | ICD-10-CM

## 2020-04-07 PROCEDURE — 99214 OFFICE O/P EST MOD 30 MIN: CPT | Mod: 95,,, | Performed by: PSYCHIATRY & NEUROLOGY

## 2020-04-07 PROCEDURE — 99214 PR OFFICE/OUTPT VISIT, EST, LEVL IV, 30-39 MIN: ICD-10-PCS | Mod: 95,,, | Performed by: PSYCHIATRY & NEUROLOGY

## 2020-04-07 RX ORDER — CITALOPRAM 10 MG/1
10 TABLET ORAL NIGHTLY
Qty: 90 TABLET | Refills: 1 | Status: SHIPPED | OUTPATIENT
Start: 2020-04-07 | End: 2020-07-10

## 2020-04-07 RX ORDER — TOPIRAMATE 50 MG/1
50 TABLET, FILM COATED ORAL 2 TIMES DAILY
Qty: 180 TABLET | Refills: 1 | Status: SHIPPED | OUTPATIENT
Start: 2020-04-07 | End: 2020-10-06 | Stop reason: SDUPTHER

## 2020-04-07 RX ORDER — OLANZAPINE 5 MG/1
5 TABLET ORAL NIGHTLY
Qty: 90 TABLET | Refills: 1 | Status: SHIPPED | OUTPATIENT
Start: 2020-04-07 | End: 2020-10-06 | Stop reason: SDUPTHER

## 2020-04-07 NOTE — PATIENT INSTRUCTIONS
"        You have been provided with a certain amount of medication with a specified number of refills.  Please follow up within an adequate time before you run out of medications.    REFILLS FOR CONTROLLED SUBSTANCES WILL NOT BE GIVEN WITHOUT AN APPOINTMENT.  I will not honor or fill automated refill requests from pharmacies.  You must come in for an appointment to get refills.        Please book your next appointment for myself or therapist by phone by calling our office at [phone].        Note that follow up appointments are 10-15 minutes long.  It is important that we focus on medication management.  Should you need therapy, please get set up with our therapist or call your insurance company to find out which therapists are available in your area.      PLEASE BE AT LEAST 15 MINUTES EARLY FOR YOUR NEXT APPOINTMENT.  Late arrivals WILL BE TURNED AWAY AND ASKED TO RESCHEDULE.  YOU MUST COME EARLY TO ALLOW TIME FOR CHECK-IN AS WELL AS GET YOUR VITAL SIGNS AND GO OVER YOUR MEDICATIONS.  T[facility]iness is not fair to the patients who present after you and are on time for their appointments.  It causes a delay in the appointments for patients and staff.  YOU MAY ALSO BE DISCHARGED FROM CLINIC with multiple late arrivals or "No Show" appointments.       -----------------------------------------------------------------------------------------------------------------  IF YOU FEEL SUICIDAL OR HAVING THOUGHTS OR PLANS TO HURT YOURSELF OR OTHERS, CALL 911 OR REPORT TO THE NEAREST EMERGENCY ROOM.  YOU CAN ALSO ACCESS THE FOLLOWING HOTLINE:    National Suicide Hotline Number 4-726-637-TALK (8640)                  "

## 2020-04-07 NOTE — PROGRESS NOTES
Outpatient Psychiatry Follow-Up Visit (MD/NP)    4/7/2020     The patient location is: home  The chief complaint leading to consultation is:  Mood changes and stressors  Visit type: Virtual visit with synchronous audio and video  Total time spent with patient:  20 min  Each patient to whom he or she provides medical services by telemedicine is:  (1) informed of the relationship between the physician and patient and the respective role of any other health care provider with respect to management of the patient; and (2) notified that he or she may decline to receive medical services by telemedicine and may withdraw from such care at any time.      Clinical Status of Patient:  Outpatient (Ambulatory)    Chief Complaint:  Zonia Skaggs is a 44 y.o. female who presents today for follow-up of mood disorder.  Met with patient.      Interval History and Content of Current Session:  Interim Events/Subjective Report/Content of Current Session: Patient Zonia Skaggs presents to clinic for follow up.  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  She was feeling a little depressed the past couple days but is feeling better today.  Still doing therapy.  Feels her medicines are working well.  She does feel somewhat sleepy throughout the day and is wondering if it could be 1 of the medicines doing this to her.  She does feel a little down or depressed every few days but this is very short-lived and she gets better quickly.  Also takes a lot of vitamins on a daily basis.  Asking to continue her medicines because she is doing so well but then is open to suggestions to help with the daytime sleepiness.  No manic or psychotic behaviors noted.    Psychotherapy:  · Target symptoms: mood disorder  · Why chosen therapy is appropriate versus another modality: relevant to diagnosis  · Outcome monitoring methods: self-report, observation  · Therapeutic intervention type:  supportive psychotherapy  · Topics discussed/themes: building skills sets for symptom management, symptom recognition  · The patient's response to the intervention is accepting. The patient's progress toward treatment goals is limited.   · Duration of intervention: 15 minutes.    Review of Systems   · PSYCHIATRIC: Pertinant items are noted in the narrative.  · CONSTITUTIONAL: Positive for weight gain.   · MUSCULOSKELETAL: No pain or stiffness of the joints.  · NEUROLOGIC: No weakness, sensory changes, seizures, confusion, memory loss, tremor or other abnormal movements.  · RESPIRATORY: No shortness of breath.  · CARDIOVASCULAR: No tachycardia or chest pain.  · GASTROINTESTINAL: No nausea, vomiting, pain, constipation or diarrhea.    Past Medical, Family and Social History: The patient's past medical, family and social history have been reviewed and updated as appropriate within the electronic medical record - see encounter notes.    Compliance:  Not fully compliant with medications because she does not want to take medications daily.    Side effects: None    Risk Parameters:  Patient reports no suicidal ideation  Patient reports no homicidal ideation  Patient reports no self-injurious behavior  Patient reports no violent behavior    Exam (detailed: at least 9 elements; comprehensive: all 15 elements)   Constitutional  Vitals:  Most recent vital signs, dated less than 90 days prior to this appointment, were reviewed.   There were no vitals filed for this visit.     General:  age appropriate, overweight     Musculoskeletal  Muscle Strength/Tone:  no tremor, no tic   Gait & Station:  Not observed; video visit     Psychiatric  Speech:  no latency; no press   Mood & Affect:  anxious  anxious   Thought Process:  normal and logical   Associations:  intact   Thought Content:  normal, no suicidality, no homicidality, delusions, or paranoia   Insight:  has awareness of illness   Judgement: behavior is adequate to  circumstances   Orientation:  person, place, situation, time/date   Memory: intact for content of interview   Language: grossly intact   Attention Span & Concentration:  able to focus   Fund of Knowledge:  intact and appropriate to age and level of education     Assessment and Diagnosis   Status/Progress: Based on the examination today, the patient's problem(s) is/are inadequately controlled.  New problems have been presented today.   Co-morbidities are complicating management of the primary condition.  There are no active rule-out diagnoses for this patient at this time.     General Impression: We will continue pharmacological intervention and adjunctive therapy.       ICD-10-CM ICD-9-CM   1. Bipolar I disorder, most recent episode mixed, in partial remission F31.77 296.65   2. Adjustment disorder with mixed anxiety and depressed mood F43.23 309.28       Intervention/Counseling/Treatment Plan   · Medication Management: Continue current medications. The risks and benefits of medication were discussed with the patient.  · Counseling provided with patient as follows: importance of compliance with chosen treatment options was emphasized, risks and benefits of treatment options, including medications, were discussed with the patient, risk factor reduction, prognosis, patient education, instructions for  management, treatment and follow-up were reviewed  1.  Continue topiramate 50mg BID (taking it every night and most days) targeting mood stabilization if she would like to start.  Warned of risk of word finding difficulties.  She does not have to take the dose at bedtime but yet take the 2nd dose in the middle of the day.  2.  Continue an urge compliance with daily use of olanzapine 5 mg p.o. nightly p.r.n. anxiety, elevated state, hypomania, sleep problems.  Warned of risk of TD, EPS, metabolic syndrome.  3.  Change fluoxetine to citalopram 10 mg p.o. daily targeting depression and anxiety.  Warned of risk of walt,  suicidality, serotonin syndrome.  This may be contributing to her sleepiness so will change to another SSRI.  4.  Told patient to limit stressors in life and start some form of meditation to help with stress management.  Asked her to get established again with individual therapy.  5.  I fear that patient may have an exacerbation of illness if she does not maintain compliance with medications.  She has a history of becoming noncompliant.      Return to Clinic: 3 months, as needed

## 2020-04-17 RX ORDER — FLUOXETINE 10 MG/1
CAPSULE ORAL
Qty: 90 CAPSULE | Refills: 1 | OUTPATIENT
Start: 2020-04-17

## 2020-07-09 ENCOUNTER — PATIENT MESSAGE (OUTPATIENT)
Dept: PSYCHIATRY | Facility: CLINIC | Age: 45
End: 2020-07-09

## 2020-07-10 RX ORDER — CITALOPRAM 20 MG/1
20 TABLET, FILM COATED ORAL NIGHTLY
Qty: 90 TABLET | Refills: 0 | Status: SHIPPED | OUTPATIENT
Start: 2020-07-10 | End: 2020-10-06 | Stop reason: SDUPTHER

## 2020-08-14 ENCOUNTER — OFFICE VISIT (OUTPATIENT)
Dept: PSYCHIATRY | Facility: CLINIC | Age: 45
End: 2020-08-14
Payer: COMMERCIAL

## 2020-08-14 DIAGNOSIS — F31.61 BIPOLAR 1 DISORDER, MIXED, MILD: Primary | ICD-10-CM

## 2020-08-14 PROCEDURE — 90834 PR PSYCHOTHERAPY W/PATIENT, 45 MIN: ICD-10-PCS | Mod: 95,,, | Performed by: SOCIAL WORKER

## 2020-08-14 PROCEDURE — 90834 PSYTX W PT 45 MINUTES: CPT | Mod: 95,,, | Performed by: SOCIAL WORKER

## 2020-08-14 NOTE — PROGRESS NOTES
"The patient location is: home  The chief complaint leading to consultation is: depression, anxiety    Visit type: audiovisual    Face to Face time with patient: 45 minutes  60 minutes of total time spent on the encounter, which includes face to face time and non-face to face time preparing to see the patient (eg, review of tests), Obtaining and/or reviewing separately obtained history, Documenting clinical information in the electronic or other health record, Independently interpreting results (not separately reported) and communicating results to the patient/family/caregiver, or Care coordination (not separately reported).         Each patient to whom he or she provides medical services by telemedicine is:  (1) informed of the relationship between the physician and patient and the respective role of any other health care provider with respect to management of the patient; and (2) notified that he or she may decline to receive medical services by telemedicine and may withdraw from such care at any time.    Notes: Patient returns for psychotherapy. Reports that she has been "busy". Reports that they started school the other day. Feels stressed about being back in school. Also admits that she isn't doing the things she should to take care of herself. States that she has gained more weight and is starting to feel bad about herself. Discussed that she isn't exercising like she has in the past. Discussed that problem is magnified because  makes comments that she views as "cruel" and it makes her feel worse. Reports that she does meditate regularly. Reports that she has thought about doing things for herself but doesn't have much time for herself. Discussed that she hopes that people will volunteer to help. Discussed being open and honest about what she needs and asks for what she needs. Discussed open communication with .     Encounter Diagnosis   Name Primary?    Bipolar 1 disorder, mixed, mild Yes "

## 2020-10-05 ENCOUNTER — PATIENT MESSAGE (OUTPATIENT)
Dept: ADMINISTRATIVE | Facility: HOSPITAL | Age: 45
End: 2020-10-05

## 2020-10-06 ENCOUNTER — OFFICE VISIT (OUTPATIENT)
Dept: PSYCHIATRY | Facility: CLINIC | Age: 45
End: 2020-10-06
Payer: COMMERCIAL

## 2020-10-06 VITALS
OXYGEN SATURATION: 98 % | HEART RATE: 90 BPM | WEIGHT: 267.06 LBS | HEIGHT: 66 IN | DIASTOLIC BLOOD PRESSURE: 79 MMHG | SYSTOLIC BLOOD PRESSURE: 120 MMHG | BODY MASS INDEX: 42.92 KG/M2

## 2020-10-06 DIAGNOSIS — F43.23 ADJUSTMENT DISORDER WITH MIXED ANXIETY AND DEPRESSED MOOD: ICD-10-CM

## 2020-10-06 DIAGNOSIS — F31.77 BIPOLAR I DISORDER, MOST RECENT EPISODE MIXED, IN PARTIAL REMISSION: Primary | ICD-10-CM

## 2020-10-06 PROCEDURE — 3008F PR BODY MASS INDEX (BMI) DOCUMENTED: ICD-10-PCS | Mod: CPTII,S$GLB,, | Performed by: PSYCHIATRY & NEUROLOGY

## 2020-10-06 PROCEDURE — 99214 PR OFFICE/OUTPT VISIT, EST, LEVL IV, 30-39 MIN: ICD-10-PCS | Mod: S$GLB,,, | Performed by: PSYCHIATRY & NEUROLOGY

## 2020-10-06 PROCEDURE — 99999 PR PBB SHADOW E&M-EST. PATIENT-LVL III: CPT | Mod: PBBFAC,,, | Performed by: PSYCHIATRY & NEUROLOGY

## 2020-10-06 PROCEDURE — 99999 PR PBB SHADOW E&M-EST. PATIENT-LVL III: ICD-10-PCS | Mod: PBBFAC,,, | Performed by: PSYCHIATRY & NEUROLOGY

## 2020-10-06 PROCEDURE — 99214 OFFICE O/P EST MOD 30 MIN: CPT | Mod: S$GLB,,, | Performed by: PSYCHIATRY & NEUROLOGY

## 2020-10-06 PROCEDURE — 3008F BODY MASS INDEX DOCD: CPT | Mod: CPTII,S$GLB,, | Performed by: PSYCHIATRY & NEUROLOGY

## 2020-10-06 RX ORDER — TOPIRAMATE 50 MG/1
50 TABLET, FILM COATED ORAL 2 TIMES DAILY
Qty: 180 TABLET | Refills: 1 | Status: SHIPPED | OUTPATIENT
Start: 2020-10-06 | End: 2021-04-08 | Stop reason: SDUPTHER

## 2020-10-06 RX ORDER — OLANZAPINE 5 MG/1
5 TABLET ORAL NIGHTLY
Qty: 90 TABLET | Refills: 1 | Status: SHIPPED | OUTPATIENT
Start: 2020-10-06 | End: 2022-02-28 | Stop reason: SDUPTHER

## 2020-10-06 RX ORDER — CITALOPRAM 20 MG/1
20 TABLET, FILM COATED ORAL NIGHTLY
Qty: 90 TABLET | Refills: 1 | Status: SHIPPED | OUTPATIENT
Start: 2020-10-06 | End: 2021-04-08 | Stop reason: SDUPTHER

## 2020-10-06 NOTE — PATIENT INSTRUCTIONS
"        You have been provided with a certain amount of medication with a specified number of refills.  Please follow up within an adequate time before you run out of medications.    REFILLS FOR CONTROLLED SUBSTANCES WILL NOT BE GIVEN WITHOUT AN APPOINTMENT.  I will not honor or fill automated refill requests from pharmacies.  You must come in for an appointment to get refills.        Please book your next appointment for myself or therapist by phone by calling our office at [phone].        Note that follow up appointments are 10-20 minutes long.  It is important that we focus on medication management.  Should you need therapy, please get set up with our therapist or call your insurance company to find out which therapists are available in your area.      PLEASE BE AT LEAST 15 MINUTES EARLY FOR YOUR NEXT APPOINTMENT.  Late arrivals WILL BE TURNED AWAY AND ASKED TO RESCHEDULE.  YOU MUST COME EARLY TO ALLOW TIME FOR CHECK-IN AS WELL AS GET YOUR VITAL SIGNS AND GO OVER YOUR MEDICATIONS.  [facility] is not fair to the patients who present after you and are on time for their appointments.  It causes a delay in the appointments for patients and staff.  YOU MAY ALSO BE DISCHARGED FROM CLINIC with multiple late arrivals, late cancellations, or "No Show" appointments.       -----------------------------------------------------------------------------------------------------------------  IF YOU FEEL SUICIDAL OR HAVING THOUGHTS OR PLANS TO HURT YOURSELF OR OTHERS, CALL 911 OR REPORT TO THE NEAREST EMERGENCY ROOM.  YOU CAN ALSO ACCESS THE FOLLOWING HOTLINE:    National Suicide Prevention Hotline Number 5-508-990-TALK (1212)                    "

## 2020-11-04 ENCOUNTER — OFFICE VISIT (OUTPATIENT)
Dept: FAMILY MEDICINE | Facility: CLINIC | Age: 45
End: 2020-11-04
Payer: COMMERCIAL

## 2020-11-04 ENCOUNTER — TELEPHONE (OUTPATIENT)
Dept: FAMILY MEDICINE | Facility: CLINIC | Age: 45
End: 2020-11-04

## 2020-11-04 VITALS
HEIGHT: 66 IN | WEIGHT: 269.63 LBS | TEMPERATURE: 98 F | DIASTOLIC BLOOD PRESSURE: 80 MMHG | OXYGEN SATURATION: 97 % | SYSTOLIC BLOOD PRESSURE: 134 MMHG | HEART RATE: 92 BPM | BODY MASS INDEX: 43.33 KG/M2

## 2020-11-04 DIAGNOSIS — F31.2 BIPOLAR DISORDER, CURRENT EPISODE MANIC SEVERE WITH PSYCHOTIC FEATURES: ICD-10-CM

## 2020-11-04 DIAGNOSIS — K43.9 VENTRAL HERNIA WITHOUT OBSTRUCTION OR GANGRENE: ICD-10-CM

## 2020-11-04 DIAGNOSIS — R10.9 ABDOMINAL PAIN, UNSPECIFIED ABDOMINAL LOCATION: ICD-10-CM

## 2020-11-04 DIAGNOSIS — Z00.00 ROUTINE GENERAL MEDICAL EXAMINATION AT A HEALTH CARE FACILITY: Primary | ICD-10-CM

## 2020-11-04 DIAGNOSIS — R73.03 BORDERLINE DIABETES: ICD-10-CM

## 2020-11-04 PROCEDURE — 99396 PR PREVENTIVE VISIT,EST,40-64: ICD-10-PCS | Mod: S$GLB,,, | Performed by: FAMILY MEDICINE

## 2020-11-04 PROCEDURE — 3008F BODY MASS INDEX DOCD: CPT | Mod: CPTII,S$GLB,, | Performed by: FAMILY MEDICINE

## 2020-11-04 PROCEDURE — 3008F PR BODY MASS INDEX (BMI) DOCUMENTED: ICD-10-PCS | Mod: CPTII,S$GLB,, | Performed by: FAMILY MEDICINE

## 2020-11-04 PROCEDURE — 99999 PR PBB SHADOW E&M-EST. PATIENT-LVL V: ICD-10-PCS | Mod: PBBFAC,,, | Performed by: FAMILY MEDICINE

## 2020-11-04 PROCEDURE — 99396 PREV VISIT EST AGE 40-64: CPT | Mod: S$GLB,,, | Performed by: FAMILY MEDICINE

## 2020-11-04 PROCEDURE — 99999 PR PBB SHADOW E&M-EST. PATIENT-LVL V: CPT | Mod: PBBFAC,,, | Performed by: FAMILY MEDICINE

## 2020-11-04 RX ORDER — FLUTICASONE PROPIONATE 50 MCG
1 SPRAY, SUSPENSION (ML) NASAL DAILY
COMMUNITY
End: 2021-09-08 | Stop reason: SDUPTHER

## 2020-11-04 RX ORDER — CITALOPRAM 10 MG/1
TABLET ORAL
COMMUNITY
Start: 2020-10-25 | End: 2021-04-08

## 2020-11-04 NOTE — PROGRESS NOTES
Chief Complaint   Patient presents with    Annual Exam       HPI  Zonia Skaggs is a 45 y.o. female with multiple medical diagnoses as listed in the medical history and problem list that presents for annual exam     Since our last visit she has had her medication adjusted from her 11/2019 visit and has gained 30 lbs. She has concerns as she feels tired and sluggish and does have the energy to exercise as much as she used to. She goes walking with her  but is inconsistent. She has also had trouble sticking her diet as her kids are picky eaters and they like junk food and starches    HPI    Patient Active Problem List   Diagnosis    Boxer's fracture, closed, initial encounter    Bipolar disorder, current episode manic severe with psychotic features    Iron deficiency anemia    Lower abdominal pain    Homicidal ideations    Laceration of right eye    Borderline diabetes    Seasonal allergic rhinitis    Gastroesophageal reflux disease without esophagitis    Abnormal urinalysis         ROS  Review of Systems   Constitutional: Positive for activity change and unexpected weight change.   HENT: Negative for hearing loss, rhinorrhea and trouble swallowing.    Eyes: Negative for discharge and visual disturbance.   Respiratory: Negative for chest tightness and wheezing.    Cardiovascular: Negative for chest pain and palpitations.   Gastrointestinal: Negative for blood in stool, constipation, diarrhea and vomiting.   Endocrine: Positive for polyuria. Negative for polydipsia.   Genitourinary: Negative for difficulty urinating, dysuria, hematuria and menstrual problem.   Musculoskeletal: Negative for arthralgias, joint swelling and neck pain.   Neurological: Positive for weakness. Negative for headaches.   Psychiatric/Behavioral: Negative for confusion and dysphoric mood.       Physical Exam  Vitals:    11/04/20 1024   BP: 134/80   BP Location: Left arm   Patient Position: Sitting   BP Method: Large (Manual)  "  Pulse: 92   Temp: 97.9 °F (36.6 °C)   TempSrc: Oral   SpO2: 97%   Weight: 122.3 kg (269 lb 10 oz)   Height: 5' 6" (1.676 m)    Body mass index is 43.52 kg/m².  Weight: 122.3 kg (269 lb 10 oz)   Height: 5' 6" (167.6 cm)     Physical Exam  Vitals signs and nursing note reviewed.   Constitutional:       General: She is not in acute distress.     Appearance: She is well-developed.   HENT:      Head: Normocephalic and atraumatic.      Right Ear: Tympanic membrane normal.      Left Ear: Tympanic membrane normal.      Nose: Nose normal.      Mouth/Throat:      Pharynx: No oropharyngeal exudate.   Neck:      Musculoskeletal: Neck supple.      Thyroid: No thyromegaly.   Cardiovascular:      Rate and Rhythm: Normal rate and regular rhythm.      Heart sounds: No murmur. No friction rub. No gallop.    Pulmonary:      Effort: Pulmonary effort is normal. No respiratory distress.      Breath sounds: Normal breath sounds. No wheezing or rales.   Abdominal:      General: Bowel sounds are normal. There is no distension.      Palpations: Abdomen is soft. There is no mass.      Tenderness: There is no abdominal tenderness. There is no guarding or rebound.      Hernia: A hernia is present.       Lymphadenopathy:      Cervical: No cervical adenopathy.   Skin:     General: Skin is warm and dry.      Findings: No rash.   Neurological:      Mental Status: She is alert and oriented to person, place, and time.   Psychiatric:         Behavior: Behavior normal.         ALLERGIES AND MEDICATIONS: updated and reviewed.  Review of patient's allergies indicates:   Allergen Reactions    Penicillins Hives and Shortness Of Breath    Morphine      Medication List with Changes/Refills   Current Medications    CETIRIZINE (ZYRTEC) 5 MG TABLET    Take 1 tablet (5 mg total) by mouth once daily.    CITALOPRAM (CELEXA) 10 MG TABLET        CITALOPRAM (CELEXA) 20 MG TABLET    Take 1 tablet (20 mg total) by mouth every evening.    FLUTICASONE PROPIONATE " (FLONASE) 50 MCG/ACTUATION NASAL SPRAY    1 spray by Each Nostril route once daily.    FOLIC ACID (FOLVITE) 1 MG TABLET    Take 1 tablet (1 mg total) by mouth once daily.    OLANZAPINE (ZYPREXA) 5 MG TABLET    Take 1 tablet (5 mg total) by mouth every evening.    TOPIRAMATE (TOPAMAX) 50 MG TABLET    Take 1 tablet (50 mg total) by mouth 2 (two) times daily.       Assessment & Plan  1. Routine general medical examination at a health care facility    2. Bipolar disorder, current episode manic severe with psychotic features    3. Borderline diabetes    4. BMI 40.0-44.9, adult    5. Abdominal pain, unspecified abdominal location    6. Ventral hernia without obstruction or gangrene        Problem List Items Addressed This Visit        Psychiatric    Bipolar disorder, current episode manic severe with psychotic features  -eval for anemia as this can also cause fatigue    Relevant Orders    Iron and TIBC    Ferritin       Endocrine    Borderline diabetes    Relevant Orders    Hemoglobin A1C      Other Visit Diagnoses     Routine general medical examination at a health care facility    -  Primary  --discussed healthy lifestyle modification with exercise and healthy diet, reviewed age appropriate screening and healthy maintenance      Relevant Orders    CBC Auto Differential    Comprehensive Metabolic Panel    Lipid Panel    BMI 40.0-44.9, adult      -will f/u thyroid level  We discussed consistent habits with walking at least 3x weekly for 20-30 min and decreasing her intake of fatty foods    Relevant Orders    TSH    Ambulatory referral/consult to Bariatric Medicine    Abdominal pain, unspecified abdominal location      -hx of ventral hernia repaired last year    Ventral hernia without obstruction or gangrene      -will re image for recurrence of hernia    Relevant Orders    CT Abdomen Pelvis With Contrast          Follow up in about 6 months (around 5/4/2021) for Follow up.    Other Orders Placed This Visit:  Orders Placed  This Encounter   Procedures    CT Abdomen Pelvis With Contrast    CBC Auto Differential    Comprehensive Metabolic Panel    Lipid Panel    Hemoglobin A1C    TSH    Iron and TIBC    Ferritin    Ambulatory referral/consult to Bariatric Medicine

## 2020-11-07 ENCOUNTER — LAB VISIT (OUTPATIENT)
Dept: LAB | Facility: HOSPITAL | Age: 45
End: 2020-11-07
Attending: FAMILY MEDICINE
Payer: COMMERCIAL

## 2020-11-07 DIAGNOSIS — Z00.00 ROUTINE GENERAL MEDICAL EXAMINATION AT A HEALTH CARE FACILITY: ICD-10-CM

## 2020-11-07 DIAGNOSIS — R73.03 BORDERLINE DIABETES: ICD-10-CM

## 2020-11-07 DIAGNOSIS — F31.2 BIPOLAR DISORDER, CURRENT EPISODE MANIC SEVERE WITH PSYCHOTIC FEATURES: ICD-10-CM

## 2020-11-07 LAB
ALBUMIN SERPL BCP-MCNC: 3.8 G/DL (ref 3.5–5.2)
ALP SERPL-CCNC: 83 U/L (ref 55–135)
ALT SERPL W/O P-5'-P-CCNC: 17 U/L (ref 10–44)
ANION GAP SERPL CALC-SCNC: 11 MMOL/L (ref 8–16)
AST SERPL-CCNC: 15 U/L (ref 10–40)
BASOPHILS # BLD AUTO: 0.05 K/UL (ref 0–0.2)
BASOPHILS NFR BLD: 0.7 % (ref 0–1.9)
BILIRUB SERPL-MCNC: 0.3 MG/DL (ref 0.1–1)
BUN SERPL-MCNC: 13 MG/DL (ref 6–20)
CALCIUM SERPL-MCNC: 9.2 MG/DL (ref 8.7–10.5)
CHLORIDE SERPL-SCNC: 112 MMOL/L (ref 95–110)
CHOLEST SERPL-MCNC: 215 MG/DL (ref 120–199)
CHOLEST/HDLC SERPL: 4.6 {RATIO} (ref 2–5)
CO2 SERPL-SCNC: 21 MMOL/L (ref 23–29)
CREAT SERPL-MCNC: 0.8 MG/DL (ref 0.5–1.4)
DIFFERENTIAL METHOD: ABNORMAL
EOSINOPHIL # BLD AUTO: 0.2 K/UL (ref 0–0.5)
EOSINOPHIL NFR BLD: 2.7 % (ref 0–8)
ERYTHROCYTE [DISTWIDTH] IN BLOOD BY AUTOMATED COUNT: 13 % (ref 11.5–14.5)
EST. GFR  (AFRICAN AMERICAN): >60 ML/MIN/1.73 M^2
EST. GFR  (NON AFRICAN AMERICAN): >60 ML/MIN/1.73 M^2
ESTIMATED AVG GLUCOSE: 123 MG/DL (ref 68–131)
FERRITIN SERPL-MCNC: 58 NG/ML (ref 20–300)
GLUCOSE SERPL-MCNC: 96 MG/DL (ref 70–110)
HBA1C MFR BLD HPLC: 5.9 % (ref 4–5.6)
HCT VFR BLD AUTO: 42 % (ref 37–48.5)
HDLC SERPL-MCNC: 47 MG/DL (ref 40–75)
HDLC SERPL: 21.9 % (ref 20–50)
HGB BLD-MCNC: 12.8 G/DL (ref 12–16)
IMM GRANULOCYTES # BLD AUTO: 0.04 K/UL (ref 0–0.04)
IMM GRANULOCYTES NFR BLD AUTO: 0.6 % (ref 0–0.5)
IRON SERPL-MCNC: 41 UG/DL (ref 30–160)
LDLC SERPL CALC-MCNC: 147 MG/DL (ref 63–159)
LYMPHOCYTES # BLD AUTO: 2.3 K/UL (ref 1–4.8)
LYMPHOCYTES NFR BLD: 33.4 % (ref 18–48)
MCH RBC QN AUTO: 28.5 PG (ref 27–31)
MCHC RBC AUTO-ENTMCNC: 30.5 G/DL (ref 32–36)
MCV RBC AUTO: 94 FL (ref 82–98)
MONOCYTES # BLD AUTO: 0.5 K/UL (ref 0.3–1)
MONOCYTES NFR BLD: 6.8 % (ref 4–15)
NEUTROPHILS # BLD AUTO: 3.9 K/UL (ref 1.8–7.7)
NEUTROPHILS NFR BLD: 55.8 % (ref 38–73)
NONHDLC SERPL-MCNC: 168 MG/DL
NRBC BLD-RTO: 0 /100 WBC
PLATELET # BLD AUTO: 318 K/UL (ref 150–350)
PMV BLD AUTO: 9.6 FL (ref 9.2–12.9)
POTASSIUM SERPL-SCNC: 4 MMOL/L (ref 3.5–5.1)
PROT SERPL-MCNC: 7.4 G/DL (ref 6–8.4)
RBC # BLD AUTO: 4.49 M/UL (ref 4–5.4)
SATURATED IRON: 13 % (ref 20–50)
SODIUM SERPL-SCNC: 144 MMOL/L (ref 136–145)
TOTAL IRON BINDING CAPACITY: 324 UG/DL (ref 250–450)
TRANSFERRIN SERPL-MCNC: 219 MG/DL (ref 200–375)
TRIGL SERPL-MCNC: 105 MG/DL (ref 30–150)
TSH SERPL DL<=0.005 MIU/L-ACNC: 2.36 UIU/ML (ref 0.4–4)
WBC # BLD AUTO: 6.94 K/UL (ref 3.9–12.7)

## 2020-11-07 PROCEDURE — 82728 ASSAY OF FERRITIN: CPT

## 2020-11-07 PROCEDURE — 80061 LIPID PANEL: CPT

## 2020-11-07 PROCEDURE — 85025 COMPLETE CBC W/AUTO DIFF WBC: CPT

## 2020-11-07 PROCEDURE — 80053 COMPREHEN METABOLIC PANEL: CPT

## 2020-11-07 PROCEDURE — 83540 ASSAY OF IRON: CPT

## 2020-11-07 PROCEDURE — 84443 ASSAY THYROID STIM HORMONE: CPT

## 2020-11-07 PROCEDURE — 83036 HEMOGLOBIN GLYCOSYLATED A1C: CPT

## 2020-11-07 PROCEDURE — 36415 COLL VENOUS BLD VENIPUNCTURE: CPT | Mod: PO

## 2020-11-10 ENCOUNTER — HOSPITAL ENCOUNTER (OUTPATIENT)
Dept: RADIOLOGY | Facility: HOSPITAL | Age: 45
Discharge: HOME | End: 2020-11-10
Attending: FAMILY MEDICINE
Payer: COMMERCIAL

## 2020-11-10 DIAGNOSIS — K43.9 VENTRAL HERNIA WITHOUT OBSTRUCTION OR GANGRENE: ICD-10-CM

## 2020-11-10 PROCEDURE — 74177 CT ABD & PELVIS W/CONTRAST: CPT | Mod: 26,,, | Performed by: RADIOLOGY

## 2020-11-10 PROCEDURE — 74177 CT ABD & PELVIS W/CONTRAST: CPT | Mod: TC

## 2020-11-10 PROCEDURE — 74177 CT ABDOMEN PELVIS WITH CONTRAST: ICD-10-PCS | Mod: 26,,, | Performed by: RADIOLOGY

## 2020-11-10 PROCEDURE — 25500020 PHARM REV CODE 255: Performed by: FAMILY MEDICINE

## 2020-11-10 RX ADMIN — IOHEXOL 100 ML: 350 INJECTION, SOLUTION INTRAVENOUS at 10:11

## 2020-11-13 NOTE — PROGRESS NOTES
Discussed over phone call with patient regarding if GI vs surgical consult is needed.     Snehal Reynoso MD

## 2020-11-13 NOTE — PROGRESS NOTES
Results given over the phone, prediabetes present, continue to work on decreasing your sugar and lower your cholesterol. Your anemia has improved    Snehal Reynoso MD

## 2020-11-18 ENCOUNTER — PATIENT MESSAGE (OUTPATIENT)
Dept: FAMILY MEDICINE | Facility: CLINIC | Age: 45
End: 2020-11-18

## 2021-01-13 DIAGNOSIS — Z12.31 OTHER SCREENING MAMMOGRAM: ICD-10-CM

## 2021-01-21 ENCOUNTER — OFFICE VISIT (OUTPATIENT)
Dept: PSYCHIATRY | Facility: CLINIC | Age: 46
End: 2021-01-21
Payer: COMMERCIAL

## 2021-01-21 DIAGNOSIS — F31.77 BIPOLAR I DISORDER, MOST RECENT EPISODE MIXED, IN PARTIAL REMISSION: Primary | ICD-10-CM

## 2021-01-21 PROCEDURE — 90834 PSYTX W PT 45 MINUTES: CPT | Mod: 95,,, | Performed by: SOCIAL WORKER

## 2021-01-21 PROCEDURE — 90834 PR PSYCHOTHERAPY W/PATIENT, 45 MIN: ICD-10-PCS | Mod: 95,,, | Performed by: SOCIAL WORKER

## 2021-02-25 ENCOUNTER — OFFICE VISIT (OUTPATIENT)
Dept: PSYCHIATRY | Facility: CLINIC | Age: 46
End: 2021-02-25
Payer: COMMERCIAL

## 2021-02-25 DIAGNOSIS — F31.77 BIPOLAR I DISORDER, MOST RECENT EPISODE MIXED, IN PARTIAL REMISSION: Primary | ICD-10-CM

## 2021-02-25 PROCEDURE — 90834 PSYTX W PT 45 MINUTES: CPT | Mod: 95,,, | Performed by: SOCIAL WORKER

## 2021-02-25 PROCEDURE — 90834 PR PSYCHOTHERAPY W/PATIENT, 45 MIN: ICD-10-PCS | Mod: 95,,, | Performed by: SOCIAL WORKER

## 2021-04-06 ENCOUNTER — PATIENT MESSAGE (OUTPATIENT)
Dept: ADMINISTRATIVE | Facility: HOSPITAL | Age: 46
End: 2021-04-06

## 2021-04-08 ENCOUNTER — OFFICE VISIT (OUTPATIENT)
Dept: PSYCHIATRY | Facility: CLINIC | Age: 46
End: 2021-04-08
Payer: COMMERCIAL

## 2021-04-08 DIAGNOSIS — F43.23 ADJUSTMENT DISORDER WITH MIXED ANXIETY AND DEPRESSED MOOD: ICD-10-CM

## 2021-04-08 DIAGNOSIS — F31.61 BIPOLAR I DISORDER, MOST RECENT EPISODE MIXED, MILD: Primary | ICD-10-CM

## 2021-04-08 PROCEDURE — 99214 PR OFFICE/OUTPT VISIT, EST, LEVL IV, 30-39 MIN: ICD-10-PCS | Mod: 95,,, | Performed by: PSYCHIATRY & NEUROLOGY

## 2021-04-08 PROCEDURE — 99214 OFFICE O/P EST MOD 30 MIN: CPT | Mod: 95,,, | Performed by: PSYCHIATRY & NEUROLOGY

## 2021-04-08 RX ORDER — TOPIRAMATE 50 MG/1
50 TABLET, FILM COATED ORAL 2 TIMES DAILY
Qty: 180 TABLET | Refills: 1 | Status: SHIPPED | OUTPATIENT
Start: 2021-04-08 | End: 2021-07-09 | Stop reason: SDUPTHER

## 2021-04-08 RX ORDER — CITALOPRAM 20 MG/1
20 TABLET, FILM COATED ORAL NIGHTLY
Qty: 90 TABLET | Refills: 1 | Status: SHIPPED | OUTPATIENT
Start: 2021-04-08 | End: 2021-07-09 | Stop reason: SDUPTHER

## 2021-04-13 ENCOUNTER — OFFICE VISIT (OUTPATIENT)
Dept: PSYCHIATRY | Facility: CLINIC | Age: 46
End: 2021-04-13
Payer: COMMERCIAL

## 2021-04-13 ENCOUNTER — PATIENT OUTREACH (OUTPATIENT)
Dept: ADMINISTRATIVE | Facility: OTHER | Age: 46
End: 2021-04-13

## 2021-04-13 DIAGNOSIS — F31.61 BIPOLAR I DISORDER, MOST RECENT EPISODE MIXED, MILD: Primary | ICD-10-CM

## 2021-04-13 PROCEDURE — 90834 PSYTX W PT 45 MINUTES: CPT | Mod: 95,,, | Performed by: SOCIAL WORKER

## 2021-04-13 PROCEDURE — 90834 PR PSYCHOTHERAPY W/PATIENT, 45 MIN: ICD-10-PCS | Mod: 95,,, | Performed by: SOCIAL WORKER

## 2021-05-10 ENCOUNTER — OFFICE VISIT (OUTPATIENT)
Dept: PSYCHIATRY | Facility: CLINIC | Age: 46
End: 2021-05-10
Payer: COMMERCIAL

## 2021-05-10 DIAGNOSIS — F31.61 BIPOLAR I DISORDER, MOST RECENT EPISODE MIXED, MILD: Primary | ICD-10-CM

## 2021-05-10 PROCEDURE — 90834 PSYTX W PT 45 MINUTES: CPT | Mod: 95,,, | Performed by: SOCIAL WORKER

## 2021-05-10 PROCEDURE — 90834 PR PSYCHOTHERAPY W/PATIENT, 45 MIN: ICD-10-PCS | Mod: 95,,, | Performed by: SOCIAL WORKER

## 2021-07-06 ENCOUNTER — PATIENT MESSAGE (OUTPATIENT)
Dept: ADMINISTRATIVE | Facility: HOSPITAL | Age: 46
End: 2021-07-06

## 2021-07-09 ENCOUNTER — OFFICE VISIT (OUTPATIENT)
Dept: PSYCHIATRY | Facility: CLINIC | Age: 46
End: 2021-07-09
Payer: COMMERCIAL

## 2021-07-09 VITALS
BODY MASS INDEX: 43.63 KG/M2 | HEIGHT: 66 IN | WEIGHT: 271.5 LBS | SYSTOLIC BLOOD PRESSURE: 134 MMHG | HEART RATE: 99 BPM | DIASTOLIC BLOOD PRESSURE: 90 MMHG

## 2021-07-09 DIAGNOSIS — F43.23 ADJUSTMENT DISORDER WITH MIXED ANXIETY AND DEPRESSED MOOD: Primary | ICD-10-CM

## 2021-07-09 DIAGNOSIS — F31.61 BIPOLAR I DISORDER, MOST RECENT EPISODE MIXED, MILD: ICD-10-CM

## 2021-07-09 PROCEDURE — 99999 PR PBB SHADOW E&M-EST. PATIENT-LVL III: ICD-10-PCS | Mod: PBBFAC,,, | Performed by: PSYCHIATRY & NEUROLOGY

## 2021-07-09 PROCEDURE — 3008F PR BODY MASS INDEX (BMI) DOCUMENTED: ICD-10-PCS | Mod: CPTII,S$GLB,, | Performed by: PSYCHIATRY & NEUROLOGY

## 2021-07-09 PROCEDURE — 1126F PR PAIN SEVERITY QUANTIFIED, NO PAIN PRESENT: ICD-10-PCS | Mod: S$GLB,,, | Performed by: PSYCHIATRY & NEUROLOGY

## 2021-07-09 PROCEDURE — 1126F AMNT PAIN NOTED NONE PRSNT: CPT | Mod: S$GLB,,, | Performed by: PSYCHIATRY & NEUROLOGY

## 2021-07-09 PROCEDURE — 99214 OFFICE O/P EST MOD 30 MIN: CPT | Mod: S$GLB,,, | Performed by: PSYCHIATRY & NEUROLOGY

## 2021-07-09 PROCEDURE — 99214 PR OFFICE/OUTPT VISIT, EST, LEVL IV, 30-39 MIN: ICD-10-PCS | Mod: S$GLB,,, | Performed by: PSYCHIATRY & NEUROLOGY

## 2021-07-09 PROCEDURE — 3008F BODY MASS INDEX DOCD: CPT | Mod: CPTII,S$GLB,, | Performed by: PSYCHIATRY & NEUROLOGY

## 2021-07-09 PROCEDURE — 99999 PR PBB SHADOW E&M-EST. PATIENT-LVL III: CPT | Mod: PBBFAC,,, | Performed by: PSYCHIATRY & NEUROLOGY

## 2021-07-09 RX ORDER — CITALOPRAM 20 MG/1
20 TABLET, FILM COATED ORAL NIGHTLY
Qty: 90 TABLET | Refills: 1 | Status: SHIPPED | OUTPATIENT
Start: 2021-07-09 | End: 2022-02-16 | Stop reason: SDUPTHER

## 2021-07-09 RX ORDER — CARIPRAZINE 1.5 MG/1
1 CAPSULE, GELATIN COATED ORAL DAILY
Qty: 30 CAPSULE | Refills: 5 | Status: SHIPPED | OUTPATIENT
Start: 2021-07-09 | End: 2022-02-16 | Stop reason: SDUPTHER

## 2021-07-09 RX ORDER — TOPIRAMATE 50 MG/1
50 TABLET, FILM COATED ORAL 2 TIMES DAILY
Qty: 180 TABLET | Refills: 1 | Status: SHIPPED | OUTPATIENT
Start: 2021-07-09 | End: 2022-02-16 | Stop reason: SDUPTHER

## 2021-07-22 ENCOUNTER — OFFICE VISIT (OUTPATIENT)
Dept: PSYCHIATRY | Facility: CLINIC | Age: 46
End: 2021-07-22
Payer: COMMERCIAL

## 2021-07-22 DIAGNOSIS — F31.61 BIPOLAR I DISORDER, MOST RECENT EPISODE MIXED, MILD: Primary | ICD-10-CM

## 2021-07-22 PROCEDURE — 90834 PSYTX W PT 45 MINUTES: CPT | Mod: 95,,, | Performed by: SOCIAL WORKER

## 2021-07-22 PROCEDURE — 90834 PR PSYCHOTHERAPY W/PATIENT, 45 MIN: ICD-10-PCS | Mod: 95,,, | Performed by: SOCIAL WORKER

## 2021-08-19 ENCOUNTER — OFFICE VISIT (OUTPATIENT)
Dept: PSYCHIATRY | Facility: CLINIC | Age: 46
End: 2021-08-19
Payer: COMMERCIAL

## 2021-08-19 DIAGNOSIS — F31.61 BIPOLAR I DISORDER, MOST RECENT EPISODE MIXED, MILD: Primary | ICD-10-CM

## 2021-08-19 PROCEDURE — 90834 PR PSYCHOTHERAPY W/PATIENT, 45 MIN: ICD-10-PCS | Mod: 95,,, | Performed by: SOCIAL WORKER

## 2021-08-19 PROCEDURE — 90834 PSYTX W PT 45 MINUTES: CPT | Mod: 95,,, | Performed by: SOCIAL WORKER

## 2021-08-26 ENCOUNTER — TELEPHONE (OUTPATIENT)
Dept: FAMILY MEDICINE | Facility: CLINIC | Age: 46
End: 2021-08-26

## 2021-09-08 ENCOUNTER — LAB VISIT (OUTPATIENT)
Dept: LAB | Facility: HOSPITAL | Age: 46
End: 2021-09-08
Attending: NURSE PRACTITIONER
Payer: COMMERCIAL

## 2021-09-08 ENCOUNTER — OFFICE VISIT (OUTPATIENT)
Dept: FAMILY MEDICINE | Facility: CLINIC | Age: 46
End: 2021-09-08
Payer: COMMERCIAL

## 2021-09-08 VITALS
HEART RATE: 97 BPM | TEMPERATURE: 98 F | OXYGEN SATURATION: 97 % | WEIGHT: 270.31 LBS | SYSTOLIC BLOOD PRESSURE: 124 MMHG | HEIGHT: 66 IN | DIASTOLIC BLOOD PRESSURE: 66 MMHG | BODY MASS INDEX: 43.44 KG/M2

## 2021-09-08 DIAGNOSIS — F31.61 BIPOLAR I DISORDER, MOST RECENT EPISODE MIXED, MILD: ICD-10-CM

## 2021-09-08 DIAGNOSIS — R07.89 ATYPICAL CHEST PAIN: ICD-10-CM

## 2021-09-08 DIAGNOSIS — R10.11 RUQ ABDOMINAL PAIN: Primary | ICD-10-CM

## 2021-09-08 DIAGNOSIS — R10.9 RIGHT FLANK PAIN: ICD-10-CM

## 2021-09-08 DIAGNOSIS — R73.03 PREDIABETES: ICD-10-CM

## 2021-09-08 PROCEDURE — 99214 OFFICE O/P EST MOD 30 MIN: CPT | Mod: S$GLB,,, | Performed by: NURSE PRACTITIONER

## 2021-09-08 PROCEDURE — 3008F PR BODY MASS INDEX (BMI) DOCUMENTED: ICD-10-PCS | Mod: CPTII,S$GLB,, | Performed by: NURSE PRACTITIONER

## 2021-09-08 PROCEDURE — 3008F BODY MASS INDEX DOCD: CPT | Mod: CPTII,S$GLB,, | Performed by: NURSE PRACTITIONER

## 2021-09-08 PROCEDURE — 93010 ELECTROCARDIOGRAM REPORT: CPT | Mod: S$GLB,,, | Performed by: INTERNAL MEDICINE

## 2021-09-08 PROCEDURE — 99999 PR PBB SHADOW E&M-EST. PATIENT-LVL IV: ICD-10-PCS | Mod: PBBFAC,,, | Performed by: NURSE PRACTITIONER

## 2021-09-08 PROCEDURE — 93005 ELECTROCARDIOGRAM TRACING: CPT | Mod: S$GLB,,, | Performed by: NURSE PRACTITIONER

## 2021-09-08 PROCEDURE — 1159F PR MEDICATION LIST DOCUMENTED IN MEDICAL RECORD: ICD-10-PCS | Mod: CPTII,S$GLB,, | Performed by: NURSE PRACTITIONER

## 2021-09-08 PROCEDURE — 3074F SYST BP LT 130 MM HG: CPT | Mod: CPTII,S$GLB,, | Performed by: NURSE PRACTITIONER

## 2021-09-08 PROCEDURE — 93010 EKG 12-LEAD: ICD-10-PCS | Mod: S$GLB,,, | Performed by: INTERNAL MEDICINE

## 2021-09-08 PROCEDURE — 1160F PR REVIEW ALL MEDS BY PRESCRIBER/CLIN PHARMACIST DOCUMENTED: ICD-10-PCS | Mod: CPTII,S$GLB,, | Performed by: NURSE PRACTITIONER

## 2021-09-08 PROCEDURE — 99214 PR OFFICE/OUTPT VISIT, EST, LEVL IV, 30-39 MIN: ICD-10-PCS | Mod: S$GLB,,, | Performed by: NURSE PRACTITIONER

## 2021-09-08 PROCEDURE — 3078F PR MOST RECENT DIASTOLIC BLOOD PRESSURE < 80 MM HG: ICD-10-PCS | Mod: CPTII,S$GLB,, | Performed by: NURSE PRACTITIONER

## 2021-09-08 PROCEDURE — 3074F PR MOST RECENT SYSTOLIC BLOOD PRESSURE < 130 MM HG: ICD-10-PCS | Mod: CPTII,S$GLB,, | Performed by: NURSE PRACTITIONER

## 2021-09-08 PROCEDURE — 81003 URINALYSIS AUTO W/O SCOPE: CPT | Performed by: NURSE PRACTITIONER

## 2021-09-08 PROCEDURE — 1160F RVW MEDS BY RX/DR IN RCRD: CPT | Mod: CPTII,S$GLB,, | Performed by: NURSE PRACTITIONER

## 2021-09-08 PROCEDURE — 99999 PR PBB SHADOW E&M-EST. PATIENT-LVL IV: CPT | Mod: PBBFAC,,, | Performed by: NURSE PRACTITIONER

## 2021-09-08 PROCEDURE — 3078F DIAST BP <80 MM HG: CPT | Mod: CPTII,S$GLB,, | Performed by: NURSE PRACTITIONER

## 2021-09-08 PROCEDURE — 1159F MED LIST DOCD IN RCRD: CPT | Mod: CPTII,S$GLB,, | Performed by: NURSE PRACTITIONER

## 2021-09-08 PROCEDURE — 93005 EKG 12-LEAD: ICD-10-PCS | Mod: S$GLB,,, | Performed by: NURSE PRACTITIONER

## 2021-09-08 RX ORDER — CARIPRAZINE 1.5 MG/1
1 CAPSULE, GELATIN COATED ORAL DAILY
Qty: 30 CAPSULE | Refills: 5 | Status: CANCELLED | OUTPATIENT
Start: 2021-09-08

## 2021-09-08 RX ORDER — FLUTICASONE PROPIONATE 50 MCG
1 SPRAY, SUSPENSION (ML) NASAL DAILY
Qty: 16 G | Refills: 5 | Status: SHIPPED | OUTPATIENT
Start: 2021-09-08

## 2021-09-09 LAB
BILIRUB UR QL STRIP: NEGATIVE
CLARITY UR REFRACT.AUTO: ABNORMAL
COLOR UR AUTO: YELLOW
GLUCOSE UR QL STRIP: NEGATIVE
HGB UR QL STRIP: NEGATIVE
KETONES UR QL STRIP: NEGATIVE
LEUKOCYTE ESTERASE UR QL STRIP: NEGATIVE
NITRITE UR QL STRIP: NEGATIVE
PH UR STRIP: 6 [PH] (ref 5–8)
PROT UR QL STRIP: NEGATIVE
SP GR UR STRIP: 1.02 (ref 1–1.03)
URN SPEC COLLECT METH UR: ABNORMAL

## 2021-09-11 ENCOUNTER — HOSPITAL ENCOUNTER (OUTPATIENT)
Dept: RADIOLOGY | Facility: HOSPITAL | Age: 46
Discharge: HOME | End: 2021-09-11
Attending: NURSE PRACTITIONER
Payer: COMMERCIAL

## 2021-09-11 DIAGNOSIS — R10.11 RUQ ABDOMINAL PAIN: ICD-10-CM

## 2021-09-11 PROCEDURE — 76705 ECHO EXAM OF ABDOMEN: CPT | Mod: TC

## 2021-09-11 PROCEDURE — 76705 ECHO EXAM OF ABDOMEN: CPT | Mod: 26,,, | Performed by: RADIOLOGY

## 2021-09-11 PROCEDURE — 76705 US ABDOMEN LIMITED: ICD-10-PCS | Mod: 26,,, | Performed by: RADIOLOGY

## 2021-09-13 ENCOUNTER — PATIENT MESSAGE (OUTPATIENT)
Dept: FAMILY MEDICINE | Facility: CLINIC | Age: 46
End: 2021-09-13

## 2021-09-17 ENCOUNTER — OFFICE VISIT (OUTPATIENT)
Dept: PSYCHIATRY | Facility: CLINIC | Age: 46
End: 2021-09-17
Payer: COMMERCIAL

## 2021-09-17 DIAGNOSIS — F31.61 BIPOLAR I DISORDER, MOST RECENT EPISODE MIXED, MILD: Primary | ICD-10-CM

## 2021-09-17 PROCEDURE — 3044F PR MOST RECENT HEMOGLOBIN A1C LEVEL <7.0%: ICD-10-PCS | Mod: CPTII,95,, | Performed by: SOCIAL WORKER

## 2021-09-17 PROCEDURE — 3044F HG A1C LEVEL LT 7.0%: CPT | Mod: CPTII,95,, | Performed by: SOCIAL WORKER

## 2021-09-17 PROCEDURE — 90834 PSYTX W PT 45 MINUTES: CPT | Mod: 95,,, | Performed by: SOCIAL WORKER

## 2021-09-17 PROCEDURE — 90834 PR PSYCHOTHERAPY W/PATIENT, 45 MIN: ICD-10-PCS | Mod: 95,,, | Performed by: SOCIAL WORKER

## 2021-10-04 ENCOUNTER — PATIENT MESSAGE (OUTPATIENT)
Dept: ADMINISTRATIVE | Facility: HOSPITAL | Age: 46
End: 2021-10-04

## 2021-10-29 ENCOUNTER — OFFICE VISIT (OUTPATIENT)
Dept: PSYCHIATRY | Facility: CLINIC | Age: 46
End: 2021-10-29
Payer: COMMERCIAL

## 2021-10-29 DIAGNOSIS — F31.61 BIPOLAR I DISORDER, MOST RECENT EPISODE MIXED, MILD: Primary | ICD-10-CM

## 2021-10-29 PROCEDURE — 90834 PSYTX W PT 45 MINUTES: CPT | Mod: 95,,, | Performed by: SOCIAL WORKER

## 2021-10-29 PROCEDURE — 90834 PR PSYCHOTHERAPY W/PATIENT, 45 MIN: ICD-10-PCS | Mod: 95,,, | Performed by: SOCIAL WORKER

## 2021-10-29 PROCEDURE — 3044F HG A1C LEVEL LT 7.0%: CPT | Mod: CPTII,95,, | Performed by: SOCIAL WORKER

## 2021-10-29 PROCEDURE — 3044F PR MOST RECENT HEMOGLOBIN A1C LEVEL <7.0%: ICD-10-PCS | Mod: CPTII,95,, | Performed by: SOCIAL WORKER

## 2021-11-10 ENCOUNTER — OFFICE VISIT (OUTPATIENT)
Dept: OBSTETRICS AND GYNECOLOGY | Facility: CLINIC | Age: 46
End: 2021-11-10
Payer: COMMERCIAL

## 2021-11-10 VITALS
BODY MASS INDEX: 42.8 KG/M2 | SYSTOLIC BLOOD PRESSURE: 128 MMHG | HEIGHT: 66 IN | WEIGHT: 266.31 LBS | DIASTOLIC BLOOD PRESSURE: 84 MMHG

## 2021-11-10 DIAGNOSIS — Z12.31 SCREENING MAMMOGRAM, ENCOUNTER FOR: ICD-10-CM

## 2021-11-10 DIAGNOSIS — Z12.4 PAP SMEAR FOR CERVICAL CANCER SCREENING: ICD-10-CM

## 2021-11-10 DIAGNOSIS — N92.0 MENORRHAGIA WITH REGULAR CYCLE: ICD-10-CM

## 2021-11-10 DIAGNOSIS — Z01.419 ENCOUNTER FOR GYNECOLOGICAL EXAMINATION WITHOUT ABNORMAL FINDING: Primary | ICD-10-CM

## 2021-11-10 PROCEDURE — 88141 CYTOPATH C/V INTERPRET: CPT | Mod: ,,, | Performed by: PATHOLOGY

## 2021-11-10 PROCEDURE — 3044F HG A1C LEVEL LT 7.0%: CPT | Mod: CPTII,S$GLB,, | Performed by: OBSTETRICS & GYNECOLOGY

## 2021-11-10 PROCEDURE — 87624 HPV HI-RISK TYP POOLED RSLT: CPT | Performed by: OBSTETRICS & GYNECOLOGY

## 2021-11-10 PROCEDURE — 3044F PR MOST RECENT HEMOGLOBIN A1C LEVEL <7.0%: ICD-10-PCS | Mod: CPTII,S$GLB,, | Performed by: OBSTETRICS & GYNECOLOGY

## 2021-11-10 PROCEDURE — 3074F PR MOST RECENT SYSTOLIC BLOOD PRESSURE < 130 MM HG: ICD-10-PCS | Mod: CPTII,S$GLB,, | Performed by: OBSTETRICS & GYNECOLOGY

## 2021-11-10 PROCEDURE — 99386 PR PREVENTIVE VISIT,NEW,40-64: ICD-10-PCS | Mod: S$GLB,,, | Performed by: OBSTETRICS & GYNECOLOGY

## 2021-11-10 PROCEDURE — 3079F DIAST BP 80-89 MM HG: CPT | Mod: CPTII,S$GLB,, | Performed by: OBSTETRICS & GYNECOLOGY

## 2021-11-10 PROCEDURE — 99386 PREV VISIT NEW AGE 40-64: CPT | Mod: S$GLB,,, | Performed by: OBSTETRICS & GYNECOLOGY

## 2021-11-10 PROCEDURE — 1159F MED LIST DOCD IN RCRD: CPT | Mod: CPTII,S$GLB,, | Performed by: OBSTETRICS & GYNECOLOGY

## 2021-11-10 PROCEDURE — 99999 PR PBB SHADOW E&M-EST. PATIENT-LVL III: ICD-10-PCS | Mod: PBBFAC,,, | Performed by: OBSTETRICS & GYNECOLOGY

## 2021-11-10 PROCEDURE — 88141 PR  CYTOPATH CERV/VAG INTERPRET: ICD-10-PCS | Mod: ,,, | Performed by: PATHOLOGY

## 2021-11-10 PROCEDURE — 3008F PR BODY MASS INDEX (BMI) DOCUMENTED: ICD-10-PCS | Mod: CPTII,S$GLB,, | Performed by: OBSTETRICS & GYNECOLOGY

## 2021-11-10 PROCEDURE — 1159F PR MEDICATION LIST DOCUMENTED IN MEDICAL RECORD: ICD-10-PCS | Mod: CPTII,S$GLB,, | Performed by: OBSTETRICS & GYNECOLOGY

## 2021-11-10 PROCEDURE — 3074F SYST BP LT 130 MM HG: CPT | Mod: CPTII,S$GLB,, | Performed by: OBSTETRICS & GYNECOLOGY

## 2021-11-10 PROCEDURE — 3079F PR MOST RECENT DIASTOLIC BLOOD PRESSURE 80-89 MM HG: ICD-10-PCS | Mod: CPTII,S$GLB,, | Performed by: OBSTETRICS & GYNECOLOGY

## 2021-11-10 PROCEDURE — 3008F BODY MASS INDEX DOCD: CPT | Mod: CPTII,S$GLB,, | Performed by: OBSTETRICS & GYNECOLOGY

## 2021-11-10 PROCEDURE — 99999 PR PBB SHADOW E&M-EST. PATIENT-LVL III: CPT | Mod: PBBFAC,,, | Performed by: OBSTETRICS & GYNECOLOGY

## 2021-11-10 PROCEDURE — 88175 CYTOPATH C/V AUTO FLUID REDO: CPT | Performed by: PATHOLOGY

## 2021-11-10 RX ORDER — TRANEXAMIC ACID 650 MG/1
1300 TABLET ORAL 3 TIMES DAILY
Qty: 30 TABLET | Refills: 6 | Status: SHIPPED | OUTPATIENT
Start: 2021-11-10 | End: 2021-11-15

## 2021-11-18 LAB
FINAL PATHOLOGIC DIAGNOSIS: ABNORMAL
HPV HR 12 DNA SPEC QL NAA+PROBE: NEGATIVE
HPV16 AG SPEC QL: NEGATIVE
HPV18 DNA SPEC QL NAA+PROBE: NEGATIVE
Lab: ABNORMAL

## 2021-11-29 ENCOUNTER — OFFICE VISIT (OUTPATIENT)
Dept: PSYCHIATRY | Facility: CLINIC | Age: 46
End: 2021-11-29
Payer: COMMERCIAL

## 2021-11-29 DIAGNOSIS — F31.61 BIPOLAR I DISORDER, MOST RECENT EPISODE MIXED, MILD: Primary | ICD-10-CM

## 2021-11-29 PROCEDURE — 90834 PSYTX W PT 45 MINUTES: CPT | Mod: 95,,, | Performed by: SOCIAL WORKER

## 2021-11-29 PROCEDURE — 90834 PR PSYCHOTHERAPY W/PATIENT, 45 MIN: ICD-10-PCS | Mod: 95,,, | Performed by: SOCIAL WORKER

## 2021-12-08 ENCOUNTER — PATIENT MESSAGE (OUTPATIENT)
Dept: ADMINISTRATIVE | Facility: HOSPITAL | Age: 46
End: 2021-12-08
Payer: COMMERCIAL

## 2021-12-21 ENCOUNTER — OFFICE VISIT (OUTPATIENT)
Dept: PSYCHIATRY | Facility: CLINIC | Age: 46
End: 2021-12-21
Payer: COMMERCIAL

## 2021-12-21 DIAGNOSIS — F31.61 BIPOLAR I DISORDER, MOST RECENT EPISODE MIXED, MILD: Primary | ICD-10-CM

## 2021-12-21 PROCEDURE — 90834 PSYTX W PT 45 MINUTES: CPT | Mod: 95,,, | Performed by: SOCIAL WORKER

## 2021-12-21 PROCEDURE — 90834 PR PSYCHOTHERAPY W/PATIENT, 45 MIN: ICD-10-PCS | Mod: 95,,, | Performed by: SOCIAL WORKER

## 2022-02-09 ENCOUNTER — OFFICE VISIT (OUTPATIENT)
Dept: PSYCHIATRY | Facility: CLINIC | Age: 47
End: 2022-02-09
Payer: COMMERCIAL

## 2022-02-09 DIAGNOSIS — F31.61 BIPOLAR I DISORDER, MOST RECENT EPISODE MIXED, MILD: Primary | ICD-10-CM

## 2022-02-09 PROCEDURE — 90832 PSYTX W PT 30 MINUTES: CPT | Mod: 95,,, | Performed by: SOCIAL WORKER

## 2022-02-09 PROCEDURE — 90832 PR PSYCHOTHERAPY W/PATIENT, 30 MIN: ICD-10-PCS | Mod: 95,,, | Performed by: SOCIAL WORKER

## 2022-02-09 NOTE — PROGRESS NOTES
"The patient location is: home  The chief complaint leading to consultation is: depression, anxiety    Visit type: audiovisual    Face to Face time with patient: 30 minutes  35 minutes of total time spent on the encounter, which includes face to face time and non-face to face time preparing to see the patient (eg, review of tests), Obtaining and/or reviewing separately obtained history, Documenting clinical information in the electronic or other health record, Independently interpreting results (not separately reported) and communicating results to the patient/family/caregiver, or Care coordination (not separately reported).     Each patient to whom he or she provides medical services by telemedicine is:  (1) informed of the relationship between the physician and patient and the respective role of any other health care provider with respect to management of the patient; and (2) notified that he or she may decline to receive medical services by telemedicine and may withdraw from such care at any time.    Notes: Patient returns for psychotherapy. Patient twenty minutes late to session. Reports that she has been "good". Reports that she is sleepy a lot. Reports that she went to [place] to see daughter graduate. States that it was nice to have some time with  without younger children. Made her realize that her and  need to do this more. States that she has been working on getting roof and carport fixed.  found someone that will do the work and get paid in increments. States that car is in the shop again and is thinking about getting a new used car. Reports that mood is still up and down. States that it was down for a while after they returned from [place]. Got offered a job and was seriously thinking about it but doesn't think it will work with current schooling situation. Discussed some stress over children at home and how she doesn't feel like she is able to do enough or different things for them " that would be helpful.       Encounter Diagnosis   Name Primary?    Bipolar I disorder, most recent episode mixed, mild Yes       Cassandra Rockweiler, [facility]

## 2022-02-16 ENCOUNTER — OFFICE VISIT (OUTPATIENT)
Dept: PSYCHIATRY | Facility: CLINIC | Age: 47
End: 2022-02-16
Payer: COMMERCIAL

## 2022-02-16 DIAGNOSIS — F31.77 BIPOLAR I DISORDER, MOST RECENT EPISODE MIXED, IN PARTIAL REMISSION: Primary | ICD-10-CM

## 2022-02-16 DIAGNOSIS — F43.23 ADJUSTMENT DISORDER WITH MIXED ANXIETY AND DEPRESSED MOOD: ICD-10-CM

## 2022-02-16 PROCEDURE — 99214 OFFICE O/P EST MOD 30 MIN: CPT | Mod: 95,,, | Performed by: PSYCHIATRY & NEUROLOGY

## 2022-02-16 PROCEDURE — 99214 PR OFFICE/OUTPT VISIT, EST, LEVL IV, 30-39 MIN: ICD-10-PCS | Mod: 95,,, | Performed by: PSYCHIATRY & NEUROLOGY

## 2022-02-16 RX ORDER — CITALOPRAM 20 MG/1
20 TABLET, FILM COATED ORAL NIGHTLY
Qty: 90 TABLET | Refills: 1 | Status: SHIPPED | OUTPATIENT
Start: 2022-02-16 | End: 2022-08-18 | Stop reason: SDUPTHER

## 2022-02-16 RX ORDER — TOPIRAMATE 50 MG/1
50 TABLET, FILM COATED ORAL 2 TIMES DAILY
Qty: 180 TABLET | Refills: 1 | Status: SHIPPED | OUTPATIENT
Start: 2022-02-16 | End: 2022-08-18 | Stop reason: SDUPTHER

## 2022-02-16 RX ORDER — CARIPRAZINE 1.5 MG/1
1 CAPSULE, GELATIN COATED ORAL DAILY
Qty: 30 CAPSULE | Refills: 5 | Status: SHIPPED | OUTPATIENT
Start: 2022-02-16 | End: 2022-08-18 | Stop reason: SDUPTHER

## 2022-02-16 NOTE — PROGRESS NOTES
Outpatient Psychiatry Follow-Up Visit (MD/NP)    2/16/2022     The patient location is: home; [place], [place]  The chief complaint leading to consultation is: mood changes; anxiety    Visit type: audiovisual    Face to Face time with patient: 25 minutes  35 minutes of total time spent on the encounter, which includes face to face time and non-face to face time preparing to see the patient (eg, review of tests), Obtaining and/or reviewing separately obtained history, Documenting clinical information in the electronic or other health record, Independently interpreting results (not separately reported) and communicating results to the patient/family/caregiver, or Care coordination (not separately reported).         Each patient to whom he or she provides medical services by telemedicine is:  (1) informed of the relationship between the physician and patient and the respective role of any other health care provider with respect to management of the patient; and (2) notified that he or she may decline to receive medical services by telemedicine and may withdraw from such care at any time.      Clinical Status of Patient:  Outpatient (Ambulatory)    Chief Complaint:  Zonia Sakggs is a 46 y.o. female who presents today for follow-up of mood disorder.  Met with patient.      Interval History and Content of Current Session:  Interim Events/Subjective Report/Content of Current Session: Patient Zonia Skaggs presents to clinic for follow up.  She has been busy with home school.  Feels like she is getting better in regards to her mood.  She again mentions that she wonders if she needs to get off meds.  She has done this multiple times in the past with bad results.  She is not having to take the olanzapine as she has done in the past.  She is happy that her daughter graduated [employer].  Feels that she was in a rut recently but is much better now.  Reading novels daily to give something for her to do for herself.  Sleeping  well.  Asking to continue medications.  No side effects noted or endorsed.    In the past has failed ziprasidone, quetiapine, olanzapine.    Psychotherapy:  · Target symptoms: mood disorder  · Why chosen therapy is appropriate versus another modality: relevant to diagnosis  · Outcome monitoring methods: self-report, observation  · Therapeutic intervention type: supportive psychotherapy  · Topics discussed/themes: building skills sets for symptom management, symptom recognition  · The patient's response to the intervention is accepting. The patient's progress toward treatment goals is limited.   · Duration of intervention: 15 minutes.    Review of Systems   · PSYCHIATRIC: Pertinant items are noted in the narrative.  · CONSTITUTIONAL: Positive for weight gain.   · MUSCULOSKELETAL: No pain or stiffness of the joints.  · NEUROLOGIC: No weakness, sensory changes, seizures, confusion, memory loss, tremor or other abnormal movements.  · RESPIRATORY: No shortness of breath.  · CARDIOVASCULAR: No tachycardia or chest pain.  · GASTROINTESTINAL: No nausea, vomiting, pain, constipation or diarrhea.    Past Medical, Family and Social History: The patient's past medical, family and social history have been reviewed and updated as appropriate within the electronic medical record - see encounter notes.    Compliance:  Not fully compliant with medications because she does not want to take medications daily.    Side effects: None    Risk Parameters:  Patient reports no suicidal ideation  Patient reports no homicidal ideation  Patient reports no self-injurious behavior  Patient reports no violent behavior    Exam (detailed: at least 9 elements; comprehensive: all 15 elements)   Constitutional  Vitals:  Most recent vital signs, dated less than 90 days prior to this appointment, were reviewed.   There were no vitals filed for this visit.     General:  age appropriate, overweight     Musculoskeletal  Muscle Strength/Tone:  no tremor, no  tic   Gait & Station:  not observed; video visit     Psychiatric  Speech:  no latency; no press   Mood & Affect:  dysthymic  congruent and appropriate   Thought Process:  normal and logical   Associations:  intact   Thought Content:  normal, no suicidality, no homicidality, delusions, or paranoia   Insight:  has awareness of illness   Judgement: behavior is adequate to circumstances   Orientation:  person, place, situation, time/date   Memory: intact for content of interview   Language: grossly intact   Attention Span & Concentration:  able to focus   Fund of Knowledge:  intact and appropriate to age and level of education     Assessment and Diagnosis   Status/Progress: Based on the examination today, the patient's problem(s) is/are inadequately controlled.  New problems have been presented today.   Co-morbidities are complicating management of the primary condition.  There are no active rule-out diagnoses for this patient at this time.     General Impression: We will continue pharmacological intervention and adjunctive therapy.       ICD-10-CM ICD-9-CM   1. Bipolar I disorder, most recent episode mixed, in partial remission  F31.77 296.65   2. Adjustment disorder with mixed anxiety and depressed mood  F43.23 309.28       Intervention/Counseling/Treatment Plan   · Medication Management: Continue current medications. The risks and benefits of medication were discussed with the patient.  · Counseling provided with patient as follows: importance of compliance with chosen treatment options was emphasized, risks and benefits of treatment options, including medications, were discussed with the patient, risk factor reduction, prognosis, patient education, instructions for  management, treatment and follow-up were reviewed  1.  Continue topiramate 50mg BID (taking it every night and most days) targeting mood stabilization if she would like to start.  Warned of risk of word finding difficulties.  She does not have to take  the dose at bedtime but yet take the 2nd dose in the middle of the day.  2.  Continue Vraylar 1.5 mg daily targeting bipolar mood stabilization.  Warned of risk of TD, EPS, metabolic syndrome.  3.  Continue citalopram 20 mg p.o. daily targeting depression and anxiety.  Warned of risk of walt, suicidality, serotonin syndrome.  This may be contributing to her sleepiness so will change to another SSRI.  4.  Continue very p.r.n. use of olanzapine 5 mg nightly targeting mood stabilization.  Warned of risk of TD, EPS, metabolic syndrome, over-sedation, weight gain.  5. Continue with meditation and individual therapy.        Return to Clinic: 6 months, as needed

## 2022-02-16 NOTE — PATIENT INSTRUCTIONS
"        You have been provided with a certain amount of medication with a specified number of refills.  Please follow up within an adequate time before you run out of medications.    REFILLS FOR CONTROLLED SUBSTANCES WILL NOT BE GIVEN WITHOUT AN APPOINTMENT.  I will not honor or fill automated refill requests from pharmacies.  You must come in for an appointment to get refills.        Please book your next appointment for myself or therapist by phone by calling our office at [phone].        Note that follow up appointments are 10-20 minutes long.  It is important that we focus on medication management.  Should you need therapy, please get set up with our therapist or call your insurance company to find out which therapists are available in your area.      PLEASE BE AT LEAST 15 MINUTES EARLY FOR YOUR NEXT APPOINTMENT.  Late arrivals WILL BE TURNED AWAY AND ASKED TO RESCHEDULE.  YOU MUST COME EARLY TO ALLOW TIME FOR CHECK-IN AS WELL AS GET YOUR VITAL SIGNS AND GO OVER YOUR MEDICATIONS.  [facility] is not fair to the patients who present after you and are on time for their appointments.  It causes a delay in the appointments for patients and staff.  YOU MAY ALSO BE DISCHARGED FROM CLINIC with multiple late arrivals, late cancellations, or "No Show" appointments.       -----------------------------------------------------------------------------------------------------------------  IF YOU FEEL SUICIDAL OR HAVING THOUGHTS OR PLANS TO HURT YOURSELF OR OTHERS, CALL 911 OR REPORT TO THE NEAREST EMERGENCY ROOM.  YOU CAN ALSO ACCESS THE FOLLOWING HOTLINE:    National Suicide Prevention Hotline Number 1-542-295-TALK (0587)                    "

## 2022-02-26 ENCOUNTER — PATIENT MESSAGE (OUTPATIENT)
Dept: PSYCHIATRY | Facility: CLINIC | Age: 47
End: 2022-02-26
Payer: COMMERCIAL

## 2022-02-28 RX ORDER — OLANZAPINE 5 MG/1
5 TABLET ORAL NIGHTLY
Qty: 90 TABLET | Refills: 1 | Status: SHIPPED | OUTPATIENT
Start: 2022-02-28 | End: 2023-06-06 | Stop reason: SDUPTHER

## 2022-03-17 ENCOUNTER — OFFICE VISIT (OUTPATIENT)
Dept: PSYCHIATRY | Facility: CLINIC | Age: 47
End: 2022-03-17
Payer: COMMERCIAL

## 2022-03-17 DIAGNOSIS — F31.77 BIPOLAR I DISORDER, MOST RECENT EPISODE MIXED, IN PARTIAL REMISSION: Primary | ICD-10-CM

## 2022-03-17 PROCEDURE — 90834 PR PSYCHOTHERAPY W/PATIENT, 45 MIN: ICD-10-PCS | Mod: 95,,, | Performed by: SOCIAL WORKER

## 2022-03-17 PROCEDURE — 90834 PSYTX W PT 45 MINUTES: CPT | Mod: 95,,, | Performed by: SOCIAL WORKER

## 2022-03-17 NOTE — PROGRESS NOTES
"The patient location is: home  The chief complaint leading to consultation is: depression, anxiety    Visit type: audiovisual    Face to Face time with patient: 45 minutes  60 minutes of total time spent on the encounter, which includes face to face time and non-face to face time preparing to see the patient (eg, review of tests), Obtaining and/or reviewing separately obtained history, Documenting clinical information in the electronic or other health record, Independently interpreting results (not separately reported) and communicating results to the patient/family/caregiver, or Care coordination (not separately reported).     Each patient to whom he or she provides medical services by telemedicine is:  (1) informed of the relationship between the physician and patient and the respective role of any other health care provider with respect to management of the patient; and (2) notified that he or she may decline to receive medical services by telemedicine and may withdraw from such care at any time.    Notes: Patient returns for psychotherapy. Patient twenty minutes late to session. Reports that she has been "better". Reports that grandmother passed away yesterday. States that she  of COVID. Reports that grandmother was ready and doesn't feel so sad about it. States that prior to that was feeling like a failure. States that children are not doing what they are suppose to be doing for school. Has a home schooling book and it makes her feel less than. Compares herself a lot to other she sees on social media. Feels like she isn't doing enough for her children and their schooling. Discussed limiting social media to help with comparing herself to others. Daughter is going to be back in a couple weeks. May drive with her to [place] when she has to report for her first station.       Encounter Diagnosis   Name Primary?    Bipolar I disorder, most recent episode mixed, in partial remission Yes       Shanae " Rockweiler, [facility]

## 2022-05-31 ENCOUNTER — PATIENT MESSAGE (OUTPATIENT)
Dept: ADMINISTRATIVE | Facility: HOSPITAL | Age: 47
End: 2022-05-31
Payer: COMMERCIAL

## 2022-07-20 ENCOUNTER — OFFICE VISIT (OUTPATIENT)
Dept: PSYCHIATRY | Facility: CLINIC | Age: 47
End: 2022-07-20
Payer: COMMERCIAL

## 2022-07-20 DIAGNOSIS — F31.77 BIPOLAR I DISORDER, MOST RECENT EPISODE MIXED, IN PARTIAL REMISSION: Primary | ICD-10-CM

## 2022-07-20 PROCEDURE — 90834 PSYTX W PT 45 MINUTES: CPT | Mod: 95,,, | Performed by: SOCIAL WORKER

## 2022-07-20 PROCEDURE — 90834 PR PSYCHOTHERAPY W/PATIENT, 45 MIN: ICD-10-PCS | Mod: 95,,, | Performed by: SOCIAL WORKER

## 2022-07-20 NOTE — PROGRESS NOTES
"The patient location is: home  The chief complaint leading to consultation is: depression, anxiety    Visit type: audiovisual    Face to Face time with patient: 38 minutes  50 minutes of total time spent on the encounter, which includes face to face time and non-face to face time preparing to see the patient (eg, review of tests), Obtaining and/or reviewing separately obtained history, Documenting clinical information in the electronic or other health record, Independently interpreting results (not separately reported) and communicating results to the patient/family/caregiver, or Care coordination (not separately reported).     Each patient to whom he or she provides medical services by telemedicine is:  (1) informed of the relationship between the physician and patient and the respective role of any other health care provider with respect to management of the patient; and (2) notified that he or she may decline to receive medical services by telemedicine and may withdraw from such care at any time.    Notes: Patient returns for psychotherapy. Patient 15 minutes late to session. Reports that things have been "relaxing". School starts for her children in a couple of weeks. Daughter is back in [place] and sends patient messages when she can. Discussed that she had some thoughts of getting off medication. Discussed that she is going to stay on it while she is starting school. Struggling with not feeling motivated. Discussed coping skills to help increase motivation/energy while also "filling" her cup. Discussed communicating with psychiatrist about medication at next session.       Encounter Diagnosis   Name Primary?    Bipolar I disorder, most recent episode mixed, in partial remission Yes       Cassandra Rockweiler, [facility]    "

## 2022-08-18 ENCOUNTER — OFFICE VISIT (OUTPATIENT)
Dept: PSYCHIATRY | Facility: CLINIC | Age: 47
End: 2022-08-18
Payer: COMMERCIAL

## 2022-08-18 DIAGNOSIS — F43.23 ADJUSTMENT DISORDER WITH MIXED ANXIETY AND DEPRESSED MOOD: ICD-10-CM

## 2022-08-18 DIAGNOSIS — F31.77 BIPOLAR I DISORDER, MOST RECENT EPISODE MIXED, IN PARTIAL REMISSION: Primary | ICD-10-CM

## 2022-08-18 PROCEDURE — 1160F RVW MEDS BY RX/DR IN RCRD: CPT | Mod: CPTII,95,, | Performed by: PSYCHIATRY & NEUROLOGY

## 2022-08-18 PROCEDURE — 1159F PR MEDICATION LIST DOCUMENTED IN MEDICAL RECORD: ICD-10-PCS | Mod: CPTII,95,, | Performed by: PSYCHIATRY & NEUROLOGY

## 2022-08-18 PROCEDURE — 99213 OFFICE O/P EST LOW 20 MIN: CPT | Mod: 95,,, | Performed by: PSYCHIATRY & NEUROLOGY

## 2022-08-18 PROCEDURE — 1159F MED LIST DOCD IN RCRD: CPT | Mod: CPTII,95,, | Performed by: PSYCHIATRY & NEUROLOGY

## 2022-08-18 PROCEDURE — 99213 PR OFFICE/OUTPT VISIT, EST, LEVL III, 20-29 MIN: ICD-10-PCS | Mod: 95,,, | Performed by: PSYCHIATRY & NEUROLOGY

## 2022-08-18 PROCEDURE — 1160F PR REVIEW ALL MEDS BY PRESCRIBER/CLIN PHARMACIST DOCUMENTED: ICD-10-PCS | Mod: CPTII,95,, | Performed by: PSYCHIATRY & NEUROLOGY

## 2022-08-18 RX ORDER — CITALOPRAM 20 MG/1
20 TABLET, FILM COATED ORAL NIGHTLY
Qty: 90 TABLET | Refills: 1 | Status: SHIPPED | OUTPATIENT
Start: 2022-08-18 | End: 2023-02-16 | Stop reason: SDUPTHER

## 2022-08-18 RX ORDER — CARIPRAZINE 1.5 MG/1
1.5 CAPSULE, GELATIN COATED ORAL DAILY
Qty: 30 CAPSULE | Refills: 5 | Status: SHIPPED | OUTPATIENT
Start: 2022-08-18 | End: 2023-02-16 | Stop reason: SDUPTHER

## 2022-08-18 RX ORDER — TOPIRAMATE 50 MG/1
50 TABLET, FILM COATED ORAL 2 TIMES DAILY
Qty: 180 TABLET | Refills: 1 | Status: SHIPPED | OUTPATIENT
Start: 2022-08-18 | End: 2023-02-16 | Stop reason: SDUPTHER

## 2022-08-18 NOTE — PROGRESS NOTES
Outpatient Psychiatry Follow-Up Visit (MD/NP)    8/18/2022     The patient location is: home; [place], [place]  The chief complaint leading to consultation is: mood changes; anxiety    Visit type: audiovisual    Face to Face time with patient: 25 minutes  35 minutes of total time spent on the encounter, which includes face to face time and non-face to face time preparing to see the patient (eg, review of tests), Obtaining and/or reviewing separately obtained history, Documenting clinical information in the electronic or other health record, Independently interpreting results (not separately reported) and communicating results to the patient/family/caregiver, or Care coordination (not separately reported).         Each patient to whom he or she provides medical services by telemedicine is:  (1) informed of the relationship between the physician and patient and the respective role of any other health care provider with respect to management of the patient; and (2) notified that he or she may decline to receive medical services by telemedicine and may withdraw from such care at any time.      Clinical Status of Patient:  Outpatient (Ambulatory)    Chief Complaint:  Zonia Skaggs is a 46 y.o. female who presents today for follow-up of mood disorder.  Met with patient.      Interval History and Content of Current Session:  Interim Events/Subjective Report/Content of Current Session: Patient Zonia Skaggs presents to clinic for follow up.  Again admits that she is not always compliant with her meds but after not taking for a few days, she starts to feel bad and resumes them.  She says that she is feeling better about herself in that she has started exercising with stuff at home and is feeling a little sore.  Still reading for fun.  Taking vitamins that she is ordering online ([employer]).  Admits that she is drinking energy drinks, energy drops, in addition to these vitamins.  Sad because their family cat recently ran away.   Able to state that she feels better when she takes her meds and wishes to continue with them.  No major depression or walt.  Has not had to take any olanzapine.  Asking to continue meds.  She continues to follow with therapy.    In the past has failed ziprasidone, quetiapine, olanzapine.    Psychotherapy:  · Target symptoms: mood disorder  · Why chosen therapy is appropriate versus another modality: relevant to diagnosis  · Outcome monitoring methods: self-report, observation  · Therapeutic intervention type: supportive psychotherapy  · Topics discussed/themes: building skills sets for symptom management, symptom recognition  · The patient's response to the intervention is accepting. The patient's progress toward treatment goals is limited.   · Duration of intervention: 15 minutes.    Review of Systems   · PSYCHIATRIC: Pertinant items are noted in the narrative.  · CONSTITUTIONAL: Positive for weight gain.   · MUSCULOSKELETAL: No pain or stiffness of the joints.  · NEUROLOGIC: No weakness, sensory changes, seizures, confusion, memory loss, tremor or other abnormal movements.  · RESPIRATORY: No shortness of breath.  · CARDIOVASCULAR: No tachycardia or chest pain.  · GASTROINTESTINAL: No nausea, vomiting, pain, constipation or diarrhea.    Past Medical, Family and Social History: The patient's past medical, family and social history have been reviewed and updated as appropriate within the electronic medical record - see encounter notes.    Compliance:  Not fully compliant with medications because she does not want to take medications daily.    Side effects: None    Risk Parameters:  Patient reports no suicidal ideation  Patient reports no homicidal ideation  Patient reports no self-injurious behavior  Patient reports no violent behavior    Exam (detailed: at least 9 elements; comprehensive: all 15 elements)   Constitutional  Vitals:  Most recent vital signs, dated less than 90 days prior to this appointment, were  reviewed.   There were no vitals filed for this visit.     General:  age appropriate, overweight     Musculoskeletal  Muscle Strength/Tone:  no tremor, no tic   Gait & Station:  not observed; video visit     Psychiatric  Speech:  no latency; no press   Mood & Affect:  steady  congruent and appropriate   Thought Process:  normal and logical   Associations:  intact   Thought Content:  normal, no suicidality, no homicidality, delusions, or paranoia   Insight:  has awareness of illness   Judgement: behavior is adequate to circumstances   Orientation:  person, place, situation, time/date   Memory: intact for content of interview   Language: grossly intact   Attention Span & Concentration:  able to focus   Fund of Knowledge:  intact and appropriate to age and level of education     Assessment and Diagnosis   Status/Progress: Based on the examination today, the patient's problem(s) is/are inadequately controlled.  New problems have been presented today.   Co-morbidities are complicating management of the primary condition.  There are no active rule-out diagnoses for this patient at this time.     General Impression: We will continue pharmacological intervention and adjunctive therapy.       ICD-10-CM ICD-9-CM   1. Bipolar I disorder, most recent episode mixed, in partial remission  F31.77 296.65   2. Adjustment disorder with mixed anxiety and depressed mood  F43.23 309.28       Intervention/Counseling/Treatment Plan   · Medication Management: Continue current medications. The risks and benefits of medication were discussed with the patient.  · Counseling provided with patient as follows: importance of compliance with chosen treatment options was emphasized, risks and benefits of treatment options, including medications, were discussed with the patient, risk factor reduction, prognosis, patient education, instructions for  management, treatment and follow-up were reviewed  1.  Continue topiramate 50mg BID (taking it every  night and most days) targeting mood stabilization if she would like to start.  Warned of risk of word finding difficulties.  She does not have to take the dose at bedtime but yet take the 2nd dose in the middle of the day.  2.  Continue Vraylar 1.5 mg daily targeting bipolar mood stabilization.  Warned of risk of TD, EPS, metabolic syndrome.  3.  Continue citalopram 20 mg p.o. daily targeting depression and anxiety.  Warned of risk of walt, suicidality, serotonin syndrome.  This may be contributing to her sleepiness so will change to another SSRI.  4.  Continue very p.r.n. use of olanzapine 5 mg nightly targeting mood stabilization.  Warned of risk of TD, EPS, metabolic syndrome, over-sedation, weight gain.  5. Continue with meditation and individual therapy.    6.  Questionable compliance with meds.      Return to Clinic: 6 months, as needed

## 2022-08-18 NOTE — PATIENT INSTRUCTIONS

## 2022-08-22 ENCOUNTER — OFFICE VISIT (OUTPATIENT)
Dept: FAMILY MEDICINE | Facility: CLINIC | Age: 47
End: 2022-08-22
Payer: COMMERCIAL

## 2022-08-22 VITALS
HEIGHT: 66 IN | TEMPERATURE: 99 F | SYSTOLIC BLOOD PRESSURE: 120 MMHG | OXYGEN SATURATION: 98 % | DIASTOLIC BLOOD PRESSURE: 76 MMHG | BODY MASS INDEX: 42.55 KG/M2 | WEIGHT: 264.75 LBS | HEART RATE: 102 BPM | RESPIRATION RATE: 16 BRPM

## 2022-08-22 DIAGNOSIS — R73.03 BORDERLINE DIABETES: ICD-10-CM

## 2022-08-22 DIAGNOSIS — Z00.00 ROUTINE GENERAL MEDICAL EXAMINATION AT A HEALTH CARE FACILITY: Primary | ICD-10-CM

## 2022-08-22 DIAGNOSIS — Z13.220 ENCOUNTER FOR LIPID SCREENING FOR CARDIOVASCULAR DISEASE: ICD-10-CM

## 2022-08-22 DIAGNOSIS — R06.83 SNORING: ICD-10-CM

## 2022-08-22 DIAGNOSIS — Z13.6 ENCOUNTER FOR LIPID SCREENING FOR CARDIOVASCULAR DISEASE: ICD-10-CM

## 2022-08-22 DIAGNOSIS — K21.9 GASTROESOPHAGEAL REFLUX DISEASE WITHOUT ESOPHAGITIS: ICD-10-CM

## 2022-08-22 DIAGNOSIS — R11.0 NAUSEA: ICD-10-CM

## 2022-08-22 PROCEDURE — 3008F BODY MASS INDEX DOCD: CPT | Mod: CPTII,S$GLB,, | Performed by: FAMILY MEDICINE

## 2022-08-22 PROCEDURE — 1159F MED LIST DOCD IN RCRD: CPT | Mod: CPTII,S$GLB,, | Performed by: FAMILY MEDICINE

## 2022-08-22 PROCEDURE — 3074F PR MOST RECENT SYSTOLIC BLOOD PRESSURE < 130 MM HG: ICD-10-PCS | Mod: CPTII,S$GLB,, | Performed by: FAMILY MEDICINE

## 2022-08-22 PROCEDURE — 3008F PR BODY MASS INDEX (BMI) DOCUMENTED: ICD-10-PCS | Mod: CPTII,S$GLB,, | Performed by: FAMILY MEDICINE

## 2022-08-22 PROCEDURE — 99396 PR PREVENTIVE VISIT,EST,40-64: ICD-10-PCS | Mod: S$GLB,,, | Performed by: FAMILY MEDICINE

## 2022-08-22 PROCEDURE — 3074F SYST BP LT 130 MM HG: CPT | Mod: CPTII,S$GLB,, | Performed by: FAMILY MEDICINE

## 2022-08-22 PROCEDURE — 3078F DIAST BP <80 MM HG: CPT | Mod: CPTII,S$GLB,, | Performed by: FAMILY MEDICINE

## 2022-08-22 PROCEDURE — 99396 PREV VISIT EST AGE 40-64: CPT | Mod: S$GLB,,, | Performed by: FAMILY MEDICINE

## 2022-08-22 PROCEDURE — 99999 PR PBB SHADOW E&M-EST. PATIENT-LVL V: ICD-10-PCS | Mod: PBBFAC,,, | Performed by: FAMILY MEDICINE

## 2022-08-22 PROCEDURE — 1159F PR MEDICATION LIST DOCUMENTED IN MEDICAL RECORD: ICD-10-PCS | Mod: CPTII,S$GLB,, | Performed by: FAMILY MEDICINE

## 2022-08-22 PROCEDURE — 99999 PR PBB SHADOW E&M-EST. PATIENT-LVL V: CPT | Mod: PBBFAC,,, | Performed by: FAMILY MEDICINE

## 2022-08-22 PROCEDURE — 3078F PR MOST RECENT DIASTOLIC BLOOD PRESSURE < 80 MM HG: ICD-10-PCS | Mod: CPTII,S$GLB,, | Performed by: FAMILY MEDICINE

## 2022-08-22 RX ORDER — OMEPRAZOLE 20 MG/1
20 CAPSULE, DELAYED RELEASE ORAL DAILY
COMMUNITY

## 2022-08-22 NOTE — PROGRESS NOTES
"Chief Complaint   Patient presents with    Diabetes       HPI    Zonia Skaggs is a 46 y.o. female  presenting for follow-up for annual exam    diabetes  She has lost 16 lbs with exercise  She has been working out with [email or web address] and making changes to her diet      bipolar  She has f/u with psychiatry, she would like to get off of her medicines but was advised against doing so at this time    Nausea  She did have some return of vomiting when she was 280 lbs  She has not had vomiting but continues to have     Snoring  She has had trouble sleeping along with snoring  Her  says she stops breathing in her sleep  She would like to be evaluated for DEVAUGHN    ROS*  Review of Systems   Constitutional: Positive for activity change and unexpected weight change.   HENT: Negative for hearing loss, rhinorrhea and trouble swallowing.    Eyes: Positive for visual disturbance. Negative for discharge.   Respiratory: Negative for chest tightness and wheezing.    Cardiovascular: Negative for chest pain and palpitations.   Gastrointestinal: Positive for vomiting. Negative for blood in stool, constipation and diarrhea.   Endocrine: Positive for polydipsia and polyuria.   Genitourinary: Positive for menstrual problem. Negative for difficulty urinating, dysuria and hematuria.   Musculoskeletal: Positive for arthralgias and neck pain. Negative for joint swelling.   Neurological: Positive for headaches. Negative for weakness.   Psychiatric/Behavioral: Negative for confusion and dysphoric mood.         Physical Exam  Vitals:    08/22/22 1429   BP: 120/76   Pulse: 102   Resp: 16   Temp: 99.1 °F (37.3 °C)   TempSrc: Oral   SpO2: 98%   Weight: 120.1 kg (264 lb 12.4 oz)   Height: 5' 6" (1.676 m)    Body mass index is 42.74 kg/m².  Weight: 120.1 kg (264 lb 12.4 oz)   Height: 5' 6" (167.6 cm)     Physical Exam  Vitals reviewed.   Constitutional:       General: She is not in acute distress.     Appearance: She is well-developed.   HENT:      Head: " Normocephalic and atraumatic.      Right Ear: Tympanic membrane normal.      Left Ear: Tympanic membrane normal.      Mouth/Throat:      Pharynx: No oropharyngeal exudate.   Neck:      Thyroid: No thyromegaly.   Cardiovascular:      Rate and Rhythm: Normal rate and regular rhythm.      Heart sounds: No murmur heard.    No friction rub. No gallop.   Pulmonary:      Effort: Pulmonary effort is normal. No respiratory distress.      Breath sounds: Normal breath sounds. No wheezing or rales.   Abdominal:      General: Bowel sounds are normal. There is no distension.      Palpations: Abdomen is soft. There is no mass.      Tenderness: There is no abdominal tenderness. There is no guarding or rebound.   Musculoskeletal:      Cervical back: Neck supple.   Lymphadenopathy:      Cervical: No cervical adenopathy.   Skin:     General: Skin is warm and dry.      Findings: No rash.   Neurological:      General: No focal deficit present.      Mental Status: She is alert. Mental status is at baseline.   Psychiatric:         Mood and Affect: Mood normal.         Thought Content: Thought content normal.         ALLERGIES AND MEDICATIONS: updated and reviewed.  Review of patient's allergies indicates:   Allergen Reactions    Penicillins Hives and Shortness Of Breath    Morphine      Medication List with Changes/Refills   Current Medications    CARIPRAZINE (VRAYLAR) 1.5 MG CAP    Take 1 capsule (1.5 mg total) by mouth once daily.    CETIRIZINE (ZYRTEC) 5 MG TABLET    Take 1 tablet (5 mg total) by mouth once daily.    CITALOPRAM (CELEXA) 20 MG TABLET    Take 1 tablet (20 mg total) by mouth every evening.    FERROUS SULFATE (IRON ORAL)    Take by mouth.    FLUTICASONE PROPIONATE (FLONASE) 50 MCG/ACTUATION NASAL SPRAY    1 spray (50 mcg total) by Each Nostril route once daily.    OLANZAPINE (ZYPREXA) 5 MG TABLET    Take 1 tablet (5 mg total) by mouth every evening.    OMEPRAZOLE (PRILOSEC) 20 MG CAPSULE    Take 20 mg by mouth once  daily.    TOPIRAMATE (TOPAMAX) 50 MG TABLET    Take 1 tablet (50 mg total) by mouth 2 (two) times daily.       Assessment & Plan  1. Routine general medical examination at a health care facility    2. Snoring    3. BMI 40.0-44.9, adult    4. Gastroesophageal reflux disease without esophagitis    5. Borderline diabetes    6. Nausea    7. Encounter for lipid screening for cardiovascular disease        Problem List Items Addressed This Visit        Endocrine    Borderline diabetes  Monitor with routine labs    Relevant Orders    Hemoglobin A1C    CBC Auto Differential    Comprehensive Metabolic Panel       GI    Gastroesophageal reflux disease without esophagitis  Continue prilosec, may take 40mg  R/o H pylori      Other Visit Diagnoses     Routine general medical examination at a health care facility    -  Primary  discussed healthy lifestyle modification with exercise and healthy diet, reviewed age appropriate screening and healthy maintenance      Snoring      Refer to sleep medicine for DEVAUGHN eval    Relevant Orders    Ambulatory referral/consult to Sleep Disorders    BMI 40.0-44.9, adult      Continue HIIT workouts and lifestyle changes  She has lost 16 lbs    Nausea      Continue prilosec, may take 40mg  R/o H pylori    Relevant Orders    H. pylori antigen, stool    Encounter for lipid screening for cardiovascular disease        Relevant Orders    Lipid Panel          Follow up for next steps depending on test results.       Snehal Reynoso MD

## 2022-08-23 ENCOUNTER — OFFICE VISIT (OUTPATIENT)
Dept: PULMONOLOGY | Facility: CLINIC | Age: 47
End: 2022-08-23
Payer: COMMERCIAL

## 2022-08-23 VITALS
HEIGHT: 66 IN | SYSTOLIC BLOOD PRESSURE: 130 MMHG | OXYGEN SATURATION: 96 % | DIASTOLIC BLOOD PRESSURE: 89 MMHG | WEIGHT: 261.56 LBS | HEART RATE: 93 BPM | BODY MASS INDEX: 42.04 KG/M2

## 2022-08-23 DIAGNOSIS — G47.30 SLEEP-RELATED BREATHING DISORDER: ICD-10-CM

## 2022-08-23 PROCEDURE — 99203 PR OFFICE/OUTPT VISIT, NEW, LEVL III, 30-44 MIN: ICD-10-PCS | Mod: S$GLB,,, | Performed by: NURSE PRACTITIONER

## 2022-08-23 PROCEDURE — 3075F PR MOST RECENT SYSTOLIC BLOOD PRESS GE 130-139MM HG: ICD-10-PCS | Mod: CPTII,S$GLB,, | Performed by: NURSE PRACTITIONER

## 2022-08-23 PROCEDURE — 3008F PR BODY MASS INDEX (BMI) DOCUMENTED: ICD-10-PCS | Mod: CPTII,S$GLB,, | Performed by: NURSE PRACTITIONER

## 2022-08-23 PROCEDURE — 1159F PR MEDICATION LIST DOCUMENTED IN MEDICAL RECORD: ICD-10-PCS | Mod: CPTII,S$GLB,, | Performed by: NURSE PRACTITIONER

## 2022-08-23 PROCEDURE — 1159F MED LIST DOCD IN RCRD: CPT | Mod: CPTII,S$GLB,, | Performed by: NURSE PRACTITIONER

## 2022-08-23 PROCEDURE — 3079F PR MOST RECENT DIASTOLIC BLOOD PRESSURE 80-89 MM HG: ICD-10-PCS | Mod: CPTII,S$GLB,, | Performed by: NURSE PRACTITIONER

## 2022-08-23 PROCEDURE — 99999 PR PBB SHADOW E&M-EST. PATIENT-LVL IV: CPT | Mod: PBBFAC,,, | Performed by: NURSE PRACTITIONER

## 2022-08-23 PROCEDURE — 3008F BODY MASS INDEX DOCD: CPT | Mod: CPTII,S$GLB,, | Performed by: NURSE PRACTITIONER

## 2022-08-23 PROCEDURE — 99203 OFFICE O/P NEW LOW 30 MIN: CPT | Mod: S$GLB,,, | Performed by: NURSE PRACTITIONER

## 2022-08-23 PROCEDURE — 3079F DIAST BP 80-89 MM HG: CPT | Mod: CPTII,S$GLB,, | Performed by: NURSE PRACTITIONER

## 2022-08-23 PROCEDURE — 3075F SYST BP GE 130 - 139MM HG: CPT | Mod: CPTII,S$GLB,, | Performed by: NURSE PRACTITIONER

## 2022-08-23 PROCEDURE — 99999 PR PBB SHADOW E&M-EST. PATIENT-LVL IV: ICD-10-PCS | Mod: PBBFAC,,, | Performed by: NURSE PRACTITIONER

## 2022-08-23 NOTE — PATIENT INSTRUCTIONS
Plan: if HST confirms DEVAUGHN:  1. Start auto CPAP therapy(the order will go to medical equipment and they will contact you after it gets processed through insurance which takes approximately 8-12 weeks)   2. There is a compliance requirement on machine. The requirement  is minimum 4 hours or more 70% nights (22/30 days) x 1 year or until machine paid off otherwise you risk not having the machine cover by your insurance company.   3. Pick a comfortable mask; but should you have problems with the mask, [facility] (medical equipment) has a 30 day mask exchangepolicy so exchange any mask within 30 days if the mask is uncomfortable. You will need to contact medical equipment to schedule an appointment with them to get another mask fitting. [facility] [phone] option 1 then option 2  4. We do not actively monitor you.  Please schedule a follow up appointment after using your cpap x 5-6 weeks to determine treatment effectiveness and address problems. This is also a requirement by some insurance in order to meet compliance.     The potential ramifications of untreated sleep apnea  could include hypoxia, daytime sleepiness, dementia, cognitive impairment, hypertension, heart disease and/or stroke. Potential treatment options, which could include weight loss, body positioning, oral appliances (OA), continuous positive airway pressure (CPAP), or referral for surgical consideration. CPAP is the most effective and least invasive treatment and I would recommend this as a first line treatment.    Behavior modification which includes losing weight, exercising, changing the sleep position, abstaining from alcohol, and avoiding certain medications    Please  abstain from driving should you feel sleepy or drowsy      Please contact us if you have questions, persistent problems or concerns.    Thank you,    Lelia LYONS, [facility]  [phone]

## 2022-08-23 NOTE — PROGRESS NOTES
Zonia Skaggs  was seen as a new patient at the request of Snehal Lester MD for the evaluation of  possible sleep apnea.    CHIEF COMPLAINT:    Chief Complaint   Patient presents with    Snoring       HISTORY OF PRESENT ILLNESS: Zonia Skaggs is a 46 y.o. female with  has a past medical history of Bipolar 1 disorder, GERD (gastroesophageal reflux disease), History of psychiatric hospitalization, psychiatric care, Psychiatric problem, Seasonal allergies, Therapy, and Vaginal delivery. is here for sleep evaluation. Patient with symptoms of  snoring and witnessed apnea by has been.  Reports feeling tired every day and excessive daytime sleepiness with ESS 11 at visit.  Patient takes sedating medication so do not wake when her  tries to arouse her.  She has been told that  she gasp and pause scares  ,   Reports leg cramps wife sleeping.  Hurts when she has tried stretches.  Walking helps    SLEEP ROUTINE:  Bedtime 10:00 p.m., 30 minutes sleep onset, out of bed 5:30 a.m..  Wakes up during the night about 3 times without problems falling back to sleep.  Naps about once for about 60 minutes around 3:00 p.m.  Patient has 5 children between the ages of 6 and 23.  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  She is working on weight loss and working out to [employer] program.  She also does yoga and meditation.    REVIEW OF SYSTEMS:   Review of Systems   Constitutional:  Positive for fatigue. Negative for fever, chills, weight loss, weight gain, activity change, appetite change and night sweats.   HENT:  Positive for congestion.    Eyes: Negative.    Respiratory:  Positive for apnea, snoring, somnolence and Paroxysmal Nocturnal Dyspnea (and jumping out of bed with slight chest paiin). Negative for cough, sputum production, chest tightness, wheezing, orthopnea, previous hospitialization due to pulmonary problems, dyspnea on extertion and use of rescue inhaler.         More winded improving  with exercise   Cardiovascular:  Negative for chest pain, palpitations and leg swelling.   Genitourinary: Negative.    Endocrine: endocrine negative    Musculoskeletal:  Negative for gait problem.   Skin: Negative.    Gastrointestinal:  Positive for acid reflux.   Neurological:  Positive for headaches (with double vision).   Psychiatric/Behavioral:  Positive for sleep disturbance.        PAST MEDICAL HISTORY:    Active Ambulatory Problems     Diagnosis Date Noted    Boxer's fracture, closed, initial encounter 06/22/2018    Bipolar disorder, current episode manic severe with psychotic features 05/21/2019    Iron deficiency anemia 05/21/2019    Lower abdominal pain 05/21/2019    Homicidal ideations 11/20/2019    Laceration of right eye 11/20/2019    Borderline diabetes 11/20/2019    Seasonal allergic rhinitis 11/20/2019    Gastroesophageal reflux disease without esophagitis 11/20/2019    Abnormal urinalysis 11/20/2019    Sleep-related breathing disorder 08/23/2022     Resolved Ambulatory Problems     Diagnosis Date Noted    Ventral hernia without obstruction or gangrene 07/17/2019     Past Medical History:   Diagnosis Date    Bipolar 1 disorder     GERD (gastroesophageal reflux disease)     History of psychiatric hospitalization     Hx of psychiatric care     Psychiatric problem     Seasonal allergies     Therapy     Vaginal delivery                 PAST SURGICAL HISTORY:    Past Surgical History:   Procedure Laterality Date    FINGER FRACTURE SURGERY      REPAIR OF INCARCERATED VENTRAL HERNIA WITHOUT HISTORY OF PRIOR REPAIR N/A 7/23/2019    Procedure: REPAIR, HERNIA, VENTRAL, INCARCERATED, WITHOUT HISTORY OF PRIOR REPAIR;  Surgeon: Bandar Madden MD;  Location: [facility] OR;  Service: General;  Laterality: N/A;         FAMILY HISTORY:                Family History   Problem Relation Age of Onset    Bipolar disorder Maternal Aunt     Bipolar disorder Paternal Aunt     Diabetes type II Mother     Diabetes Mother      "Hyperlipidemia Mother     Breast cancer Other     Diabetes Other     Colon cancer Neg Hx     Ovarian cancer Neg Hx     Anxiety disorder Neg Hx     Depression Neg Hx     Suicide Neg Hx     Stroke Neg Hx     Hypertension Neg Hx        SOCIAL HISTORY:          Tobacco:   Social History     Tobacco Use   Smoking Status Never   Smokeless Tobacco Never       alcohol use:    Social History     Substance and Sexual Activity   Alcohol Use No                   ALLERGIES:    Review of patient's allergies indicates:   Allergen Reactions    Penicillins Hives and Shortness Of Breath    Morphine        CURRENT MEDICATIONS:    Current Outpatient Medications   Medication Sig Dispense Refill    cariprazine (VRAYLAR) 1.5 mg Cap Take 1 capsule (1.5 mg total) by mouth once daily. 30 capsule 5    citalopram (CELEXA) 20 MG tablet Take 1 tablet (20 mg total) by mouth every evening. 90 tablet 1    ferrous sulfate (IRON ORAL) Take by mouth.      fluticasone propionate (FLONASE) 50 mcg/actuation nasal spray 1 spray (50 mcg total) by Each Nostril route once daily. 16 g 5    OLANZapine (ZYPREXA) 5 MG tablet Take 1 tablet (5 mg total) by mouth every evening. 90 tablet 1    omeprazole (PRILOSEC) 20 MG capsule Take 20 mg by mouth once daily.      topiramate (TOPAMAX) 50 MG tablet Take 1 tablet (50 mg total) by mouth 2 (two) times daily. 180 tablet 1    cetirizine (ZYRTEC) 5 MG tablet Take 1 tablet (5 mg total) by mouth once daily. 30 tablet 0     No current facility-administered medications for this visit.                     PHYSICAL EXAM:  Vitals:    08/23/22 1321   BP: 130/89   Pulse: 93   SpO2: 96%   Weight: 118.6 kg (261 lb 9.2 oz)   Height: 5' 6" (1.676 m)   PainSc: 0-No pain     Body mass index is 42.22 kg/m².   Physical Exam   Constitutional: She is oriented to person, place, and time. She appears well-developed.   HENT:   Head: Normocephalic.   Cardiovascular: Normal rate, regular rhythm and normal heart sounds.   Pulmonary/Chest: Normal " expansion, effort normal and breath sounds normal.   Musculoskeletal:         General: Edema present.      Cervical back: Neck supple.   Neurological: She is alert and oriented to person, place, and time. Gait normal.   Skin: Skin is warm.   Psychiatric: She has a normal mood and affect. Her behavior is normal. Judgment and thought content normal.                                  DATA:  TSH:  Lab Results   Component Value Date    TSH 2.359 11/07/2020     CBC:  Lab Results   Component Value Date    WBC 6.63 09/08/2021    HGB 11.7 (L) 09/08/2021    HCT 39.4 09/08/2021    MCV 90 09/08/2021     09/08/2021     BMP:  Lab Results   Component Value Date     09/08/2021    K 4.2 09/08/2021     09/08/2021    CO2 21 (L) 09/08/2021    BUN 10 09/08/2021    CREATININE 0.8 09/08/2021    CALCIUM 9.3 09/08/2021    ANIONGAP 9 09/08/2021    ESTGFRAFRICA >60.0 09/08/2021    EGFRNONAA >60.0 09/08/2021     HgbA1C:  Lab Results   Component Value Date    HGBA1C 5.8 (H) 09/08/2021              ASSESSMENT    ICD-10-CM ICD-9-CM    1. Sleep-related breathing disorder  G47.30 780.59 Ambulatory referral/consult to Sleep Disorders      Home Sleep Studies          PLAN:    Sleep Breathing Disorder. The patient symptomatically has snoring, witnessed apnea, fatigue, EDS  with findings of thick neck (20 in) and obesity. This warrants further investigation for possible obstructive sleep apnea.  Patient will be contacted after sleep study is done.  She is interested in CPAP treatment      Diagnostic: HST. The nature of this procedure and its indication was discussed with the patient.     Education: During our discussion today, we talked about the etiology of obstructive sleep apnea as well as the potential ramifications of untreated sleep apnea, which could include daytime sleepiness, hypertension, heart disease and/or stroke.     Precautions: The patient was advised to abstain from driving should they feel sleepy or drowsy.        Thank you for allowing me the opportunity to participate in the care of your patient.    Patient will Follow up for 5-6 weeks following cpap usage, please bring cpap.   Please cc note to  Snehal Lester MD.

## 2022-08-24 ENCOUNTER — PATIENT MESSAGE (OUTPATIENT)
Dept: ADMINISTRATIVE | Facility: HOSPITAL | Age: 47
End: 2022-08-24
Payer: COMMERCIAL

## 2022-09-02 ENCOUNTER — HOSPITAL ENCOUNTER (OUTPATIENT)
Dept: SLEEP MEDICINE | Facility: HOSPITAL | Age: 47
Discharge: HOME | End: 2022-09-02
Attending: NURSE PRACTITIONER
Payer: COMMERCIAL

## 2022-09-02 DIAGNOSIS — G47.30 SLEEP-RELATED BREATHING DISORDER: ICD-10-CM

## 2022-09-02 PROCEDURE — 95806 SLEEP STUDY UNATT&RESP EFFT: CPT | Mod: 26,,, | Performed by: INTERNAL MEDICINE

## 2022-09-02 PROCEDURE — 95806 PR SLEEP STUDY, UNATTENDED, SIMUL RECORD HR/O2 SAT/RESP FLOW/RESP EFFT: ICD-10-PCS | Mod: 26,,, | Performed by: INTERNAL MEDICINE

## 2022-09-02 PROCEDURE — 95800 SLP STDY UNATTENDED: CPT

## 2022-09-02 NOTE — PROGRESS NOTES
The patient ID was verified. She was instructed on how to turn the Home Sleep Testing device on and off, how to apply the sensors. She was encouraged to sleep on supine position and must have 6 hours of sleep. The patient was instructed not to get the device wet and return it back to us. All questions were answered prior to patient leaving. She was provided the after visit summary.

## 2022-09-07 NOTE — PROCEDURES
"Dear Provider,     You have ordered sleep LAB services to perform the sleep study for Zonia Skaggs.  The sleep study that you ordered is complete.      Please find Sleep Study result in "Chart Review" under the "Media tab."      As the ordering provider, you are responsible for reviewing the results and implementing a treatment plan with your patient.    If you need a Sleep Medicine provider to explain the sleep study findings and arrange treatment for the patient, please refer patient for consultation to our Sleep Clinic via [facility] with Ambulatory Consult Sleep.    To do that please place an order for an  "Ambulatory Consult Sleep" - it will go to our clinic work queue for our Medical Assistant to contact the patient for an appointment.     For any questions, please contact our clinic staff at [phone] to talk to clinical staff.   "

## 2022-09-08 ENCOUNTER — PATIENT MESSAGE (OUTPATIENT)
Dept: PULMONOLOGY | Facility: CLINIC | Age: 47
End: 2022-09-08
Payer: COMMERCIAL

## 2022-09-08 ENCOUNTER — LAB VISIT (OUTPATIENT)
Dept: LAB | Facility: HOSPITAL | Age: 47
End: 2022-09-08
Attending: FAMILY MEDICINE
Payer: COMMERCIAL

## 2022-09-08 DIAGNOSIS — R73.03 BORDERLINE DIABETES: ICD-10-CM

## 2022-09-08 DIAGNOSIS — Z13.6 ENCOUNTER FOR LIPID SCREENING FOR CARDIOVASCULAR DISEASE: ICD-10-CM

## 2022-09-08 DIAGNOSIS — G47.33 OSA (OBSTRUCTIVE SLEEP APNEA): Primary | ICD-10-CM

## 2022-09-08 DIAGNOSIS — R11.0 NAUSEA: ICD-10-CM

## 2022-09-08 DIAGNOSIS — Z13.220 ENCOUNTER FOR LIPID SCREENING FOR CARDIOVASCULAR DISEASE: ICD-10-CM

## 2022-09-08 LAB
ALBUMIN SERPL BCP-MCNC: 4 G/DL (ref 3.5–5.2)
ALP SERPL-CCNC: 77 U/L (ref 55–135)
ALT SERPL W/O P-5'-P-CCNC: 22 U/L (ref 10–44)
ANION GAP SERPL CALC-SCNC: 9 MMOL/L (ref 8–16)
AST SERPL-CCNC: 13 U/L (ref 10–40)
BASOPHILS # BLD AUTO: 0.05 K/UL (ref 0–0.2)
BASOPHILS NFR BLD: 0.8 % (ref 0–1.9)
BILIRUB SERPL-MCNC: 0.4 MG/DL (ref 0.1–1)
BUN SERPL-MCNC: 10 MG/DL (ref 6–20)
CALCIUM SERPL-MCNC: 9.9 MG/DL (ref 8.7–10.5)
CHLORIDE SERPL-SCNC: 109 MMOL/L (ref 95–110)
CHOLEST SERPL-MCNC: 199 MG/DL (ref 120–199)
CHOLEST/HDLC SERPL: 4.1 {RATIO} (ref 2–5)
CO2 SERPL-SCNC: 23 MMOL/L (ref 23–29)
CREAT SERPL-MCNC: 0.8 MG/DL (ref 0.5–1.4)
DIFFERENTIAL METHOD: ABNORMAL
EOSINOPHIL # BLD AUTO: 0.1 K/UL (ref 0–0.5)
EOSINOPHIL NFR BLD: 1.6 % (ref 0–8)
ERYTHROCYTE [DISTWIDTH] IN BLOOD BY AUTOMATED COUNT: 14.7 % (ref 11.5–14.5)
EST. GFR  (NO RACE VARIABLE): >60 ML/MIN/1.73 M^2
ESTIMATED AVG GLUCOSE: 123 MG/DL (ref 68–131)
GLUCOSE SERPL-MCNC: 102 MG/DL (ref 70–110)
HBA1C MFR BLD: 5.9 % (ref 4–5.6)
HCT VFR BLD AUTO: 40.1 % (ref 37–48.5)
HDLC SERPL-MCNC: 48 MG/DL (ref 40–75)
HDLC SERPL: 24.1 % (ref 20–50)
HGB BLD-MCNC: 12.3 G/DL (ref 12–16)
IMM GRANULOCYTES # BLD AUTO: 0.02 K/UL (ref 0–0.04)
IMM GRANULOCYTES NFR BLD AUTO: 0.3 % (ref 0–0.5)
LDLC SERPL CALC-MCNC: 122.2 MG/DL (ref 63–159)
LYMPHOCYTES # BLD AUTO: 1.9 K/UL (ref 1–4.8)
LYMPHOCYTES NFR BLD: 30.9 % (ref 18–48)
MCH RBC QN AUTO: 24.6 PG (ref 27–31)
MCHC RBC AUTO-ENTMCNC: 30.7 G/DL (ref 32–36)
MCV RBC AUTO: 80 FL (ref 82–98)
MONOCYTES # BLD AUTO: 0.3 K/UL (ref 0.3–1)
MONOCYTES NFR BLD: 5.5 % (ref 4–15)
NEUTROPHILS # BLD AUTO: 3.7 K/UL (ref 1.8–7.7)
NEUTROPHILS NFR BLD: 60.9 % (ref 38–73)
NONHDLC SERPL-MCNC: 151 MG/DL
NRBC BLD-RTO: 0 /100 WBC
PLATELET # BLD AUTO: 384 K/UL (ref 150–450)
PMV BLD AUTO: 9.3 FL (ref 9.2–12.9)
POTASSIUM SERPL-SCNC: 4 MMOL/L (ref 3.5–5.1)
PROT SERPL-MCNC: 7.2 G/DL (ref 6–8.4)
RBC # BLD AUTO: 5 M/UL (ref 4–5.4)
SODIUM SERPL-SCNC: 141 MMOL/L (ref 136–145)
TRIGL SERPL-MCNC: 144 MG/DL (ref 30–150)
WBC # BLD AUTO: 6.15 K/UL (ref 3.9–12.7)

## 2022-09-08 PROCEDURE — 83036 HEMOGLOBIN GLYCOSYLATED A1C: CPT | Performed by: FAMILY MEDICINE

## 2022-09-08 PROCEDURE — 80053 COMPREHEN METABOLIC PANEL: CPT | Performed by: FAMILY MEDICINE

## 2022-09-08 PROCEDURE — 85025 COMPLETE CBC W/AUTO DIFF WBC: CPT | Performed by: FAMILY MEDICINE

## 2022-09-08 PROCEDURE — 80061 LIPID PANEL: CPT | Performed by: FAMILY MEDICINE

## 2022-09-08 PROCEDURE — 36415 COLL VENOUS BLD VENIPUNCTURE: CPT | Mod: PO | Performed by: FAMILY MEDICINE

## 2022-09-08 PROCEDURE — 87338 HPYLORI STOOL AG IA: CPT | Performed by: FAMILY MEDICINE

## 2022-09-14 LAB
H PYLORI AG STL QL IA: DETECTED
SPECIMEN SOURCE: ABNORMAL

## 2022-09-15 ENCOUNTER — PATIENT MESSAGE (OUTPATIENT)
Dept: FAMILY MEDICINE | Facility: CLINIC | Age: 47
End: 2022-09-15
Payer: COMMERCIAL

## 2022-09-15 DIAGNOSIS — A04.8 H. PYLORI INFECTION: Primary | ICD-10-CM

## 2022-09-15 RX ORDER — METRONIDAZOLE 500 MG/1
500 TABLET ORAL EVERY 12 HOURS
Qty: 28 TABLET | Refills: 0 | Status: SHIPPED | OUTPATIENT
Start: 2022-09-15 | End: 2022-09-29

## 2022-09-15 RX ORDER — CLARITHROMYCIN 500 MG/1
500 TABLET, FILM COATED ORAL 2 TIMES DAILY
Qty: 28 TABLET | Refills: 0 | Status: SHIPPED | OUTPATIENT
Start: 2022-09-15 | End: 2022-09-29

## 2022-09-21 DIAGNOSIS — A04.8 H. PYLORI INFECTION: Primary | ICD-10-CM

## 2022-10-10 ENCOUNTER — PATIENT MESSAGE (OUTPATIENT)
Dept: ADMINISTRATIVE | Facility: HOSPITAL | Age: 47
End: 2022-10-10
Payer: COMMERCIAL

## 2022-10-12 ENCOUNTER — OFFICE VISIT (OUTPATIENT)
Dept: PSYCHIATRY | Facility: CLINIC | Age: 47
End: 2022-10-12
Payer: COMMERCIAL

## 2022-10-12 DIAGNOSIS — F31.77 BIPOLAR I DISORDER, MOST RECENT EPISODE MIXED, IN PARTIAL REMISSION: Primary | ICD-10-CM

## 2022-10-12 PROCEDURE — 3044F PR MOST RECENT HEMOGLOBIN A1C LEVEL <7.0%: ICD-10-PCS | Mod: CPTII,95,, | Performed by: SOCIAL WORKER

## 2022-10-12 PROCEDURE — 3044F HG A1C LEVEL LT 7.0%: CPT | Mod: CPTII,95,, | Performed by: SOCIAL WORKER

## 2022-10-12 PROCEDURE — 99999 PR PBB SHADOW E&M-EST. PATIENT-LVL I: CPT | Mod: PBBFAC,,, | Performed by: SOCIAL WORKER

## 2022-10-12 PROCEDURE — 90834 PR PSYCHOTHERAPY W/PATIENT, 45 MIN: ICD-10-PCS | Mod: 95,,, | Performed by: SOCIAL WORKER

## 2022-10-12 PROCEDURE — 99999 PR PBB SHADOW E&M-EST. PATIENT-LVL I: ICD-10-PCS | Mod: PBBFAC,,, | Performed by: SOCIAL WORKER

## 2022-10-12 PROCEDURE — 90834 PSYTX W PT 45 MINUTES: CPT | Mod: 95,,, | Performed by: SOCIAL WORKER
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
## 2022-11-09 ENCOUNTER — OFFICE VISIT (OUTPATIENT)
Dept: PSYCHIATRY | Facility: CLINIC | Age: 47
End: 2022-11-09
Payer: COMMERCIAL

## 2022-11-09 DIAGNOSIS — F31.77 BIPOLAR I DISORDER, MOST RECENT EPISODE MIXED, IN PARTIAL REMISSION: Primary | ICD-10-CM

## 2022-11-09 PROCEDURE — 99999 PR PBB SHADOW E&M-EST. PATIENT-LVL I: CPT | Mod: PBBFAC,,, | Performed by: SOCIAL WORKER

## 2022-11-09 PROCEDURE — 90834 PSYTX W PT 45 MINUTES: CPT | Mod: 95,S$GLB,, | Performed by: SOCIAL WORKER

## 2022-11-09 PROCEDURE — 99999 PR PBB SHADOW E&M-EST. PATIENT-LVL I: ICD-10-PCS | Mod: PBBFAC,,, | Performed by: SOCIAL WORKER

## 2022-11-09 PROCEDURE — 3044F HG A1C LEVEL LT 7.0%: CPT | Mod: CPTII,95,S$GLB, | Performed by: SOCIAL WORKER

## 2022-11-09 PROCEDURE — 3044F PR MOST RECENT HEMOGLOBIN A1C LEVEL <7.0%: ICD-10-PCS | Mod: CPTII,95,S$GLB, | Performed by: SOCIAL WORKER

## 2022-11-09 PROCEDURE — 90834 PR PSYCHOTHERAPY W/PATIENT, 45 MIN: ICD-10-PCS | Mod: 95,S$GLB,, | Performed by: SOCIAL WORKER

## 2022-11-09 NOTE — PROGRESS NOTES
"The patient location is: home  The chief complaint leading to consultation is: depression, anxiety    Visit type: audiovisual    Face to Face time with patient: 40 minutes  60 minutes of total time spent on the encounter, which includes face to face time and non-face to face time preparing to see the patient (eg, review of tests), Obtaining and/or reviewing separately obtained history, Documenting clinical information in the electronic or other health record, Independently interpreting results (not separately reported) and communicating results to the patient/family/caregiver, or Care coordination (not separately reported).     Each patient to whom he or she provides medical services by telemedicine is:  (1) informed of the relationship between the physician and patient and the respective role of any other health care provider with respect to management of the patient; and (2) notified that he or she may decline to receive medical services by telemedicine and may withdraw from such care at any time.    Notes: Patient returns for psychotherapy. Patient reports that she is doing "okay". Daughter deployed yesterday and will be there two weeks. Not sure where she will be going after that. Discussed that it has been hard to say goodbye to her again. States that she started to get sad bordering on depressed. Didn't want to get out of bed which is a sign for her. Took sons to doctor and middle son has ADD. Still hasn't heard anything from immigration and that stresses  out. Discussed that sleep is not great. States that she doesn't like the CPAP machine. Also not sleeping well because daughter still tries to keep climbing in bed with her. Discussed the struggle of being primary parent and having to keep everything together.     Encounter Diagnosis   Name Primary?    Bipolar I disorder, most recent episode mixed, in partial remission Yes       Cassandra Rockweiler, [facility]        "

## 2022-11-16 ENCOUNTER — CLINICAL SUPPORT (OUTPATIENT)
Dept: ENDOSCOPY | Facility: HOSPITAL | Age: 47
End: 2022-11-16
Attending: FAMILY MEDICINE
Payer: COMMERCIAL

## 2022-11-16 VITALS — WEIGHT: 254 LBS | HEIGHT: 65 IN | BODY MASS INDEX: 42.32 KG/M2

## 2022-11-16 DIAGNOSIS — A04.8 H. PYLORI INFECTION: ICD-10-CM

## 2022-11-29 ENCOUNTER — TELEPHONE (OUTPATIENT)
Dept: ENDOSCOPY | Facility: HOSPITAL | Age: 47
End: 2022-11-29
Payer: COMMERCIAL

## 2022-11-29 NOTE — TELEPHONE ENCOUNTER
----- Message from Dayana Cuevas sent at 11/29/2022 11:15 AM CST -----  Regarding: appt  Contact: pt [phone]  Pt is calling to reschedule her procedure on 12/1, pt said that she don't have a ride and needs to reschedule. Please call

## 2022-11-30 ENCOUNTER — PATIENT MESSAGE (OUTPATIENT)
Dept: ENDOSCOPY | Facility: HOSPITAL | Age: 47
End: 2022-11-30
Payer: COMMERCIAL

## 2022-12-02 ENCOUNTER — OFFICE VISIT (OUTPATIENT)
Dept: PSYCHIATRY | Facility: CLINIC | Age: 47
End: 2022-12-02
Payer: COMMERCIAL

## 2022-12-02 DIAGNOSIS — F31.77 BIPOLAR I DISORDER, MOST RECENT EPISODE MIXED, IN PARTIAL REMISSION: Primary | ICD-10-CM

## 2022-12-02 PROCEDURE — 3044F HG A1C LEVEL LT 7.0%: CPT | Mod: CPTII,95,, | Performed by: SOCIAL WORKER

## 2022-12-02 PROCEDURE — 90834 PR PSYCHOTHERAPY W/PATIENT, 45 MIN: ICD-10-PCS | Mod: 95,,, | Performed by: SOCIAL WORKER

## 2022-12-02 PROCEDURE — 3044F PR MOST RECENT HEMOGLOBIN A1C LEVEL <7.0%: ICD-10-PCS | Mod: CPTII,95,, | Performed by: SOCIAL WORKER

## 2022-12-02 PROCEDURE — 99211 OFF/OP EST MAY X REQ PHY/QHP: CPT | Performed by: SOCIAL WORKER

## 2022-12-02 PROCEDURE — 90834 PSYTX W PT 45 MINUTES: CPT | Mod: 95,,, | Performed by: SOCIAL WORKER
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
## 2022-12-28 ENCOUNTER — TELEPHONE (OUTPATIENT)
Dept: PSYCHIATRY | Facility: CLINIC | Age: 47
End: 2022-12-28
Payer: COMMERCIAL

## 2022-12-28 NOTE — TELEPHONE ENCOUNTER
Called & spoke w/ pt regarding r/s of her appt on 1/5 due to len being out of town. Appt r/s to 1/18 @ 2pm virtually. No concerns voiced.

## 2023-01-06 ENCOUNTER — HOSPITAL ENCOUNTER (OUTPATIENT)
Facility: HOSPITAL | Age: 48
Discharge: HOME | End: 2023-01-06
Attending: INTERNAL MEDICINE | Admitting: INTERNAL MEDICINE
Payer: COMMERCIAL

## 2023-01-06 ENCOUNTER — ANESTHESIA EVENT (OUTPATIENT)
Dept: ENDOSCOPY | Facility: HOSPITAL | Age: 48
End: 2023-01-06
Payer: COMMERCIAL

## 2023-01-06 ENCOUNTER — ANESTHESIA (OUTPATIENT)
Dept: ENDOSCOPY | Facility: HOSPITAL | Age: 48
End: 2023-01-06
Payer: COMMERCIAL

## 2023-01-06 VITALS
DIASTOLIC BLOOD PRESSURE: 67 MMHG | SYSTOLIC BLOOD PRESSURE: 122 MMHG | TEMPERATURE: 98 F | OXYGEN SATURATION: 98 % | RESPIRATION RATE: 16 BRPM | WEIGHT: 270 LBS | HEART RATE: 86 BPM | HEIGHT: 65 IN | BODY MASS INDEX: 44.98 KG/M2

## 2023-01-06 DIAGNOSIS — Z12.11 SCREENING FOR COLON CANCER: Primary | ICD-10-CM

## 2023-01-06 DIAGNOSIS — D50.9 IRON DEFICIENCY ANEMIA: ICD-10-CM

## 2023-01-06 LAB
B-HCG UR QL: NEGATIVE
CTP QC/QA: YES

## 2023-01-06 PROCEDURE — 25000003 PHARM REV CODE 250: Performed by: NURSE ANESTHETIST, CERTIFIED REGISTERED

## 2023-01-06 PROCEDURE — 37000009 HC ANESTHESIA EA ADD 15 MINS: Performed by: INTERNAL MEDICINE

## 2023-01-06 PROCEDURE — 63600175 PHARM REV CODE 636 W HCPCS: Performed by: NURSE ANESTHETIST, CERTIFIED REGISTERED

## 2023-01-06 PROCEDURE — 88305 TISSUE EXAM BY PATHOLOGIST: CPT | Mod: 26,,, | Performed by: PATHOLOGY

## 2023-01-06 PROCEDURE — E9220 PRA ENDO ANESTHESIA: ICD-10-PCS | Mod: ,,, | Performed by: NURSE ANESTHETIST, CERTIFIED REGISTERED

## 2023-01-06 PROCEDURE — 43239 EGD BIOPSY SINGLE/MULTIPLE: CPT | Mod: ,,, | Performed by: INTERNAL MEDICINE

## 2023-01-06 PROCEDURE — 81025 URINE PREGNANCY TEST: CPT | Performed by: INTERNAL MEDICINE

## 2023-01-06 PROCEDURE — 37000008 HC ANESTHESIA 1ST 15 MINUTES: Performed by: INTERNAL MEDICINE

## 2023-01-06 PROCEDURE — 88305 TISSUE EXAM BY PATHOLOGIST: ICD-10-PCS | Mod: 26,,, | Performed by: PATHOLOGY

## 2023-01-06 PROCEDURE — 88305 TISSUE EXAM BY PATHOLOGIST: CPT | Mod: 59 | Performed by: PATHOLOGY

## 2023-01-06 PROCEDURE — 27201012 HC FORCEPS, HOT/COLD, DISP: Performed by: INTERNAL MEDICINE

## 2023-01-06 PROCEDURE — 25000003 PHARM REV CODE 250: Performed by: INTERNAL MEDICINE

## 2023-01-06 PROCEDURE — E9220 PRA ENDO ANESTHESIA: HCPCS | Mod: ,,, | Performed by: NURSE ANESTHETIST, CERTIFIED REGISTERED

## 2023-01-06 PROCEDURE — 43239 EGD BIOPSY SINGLE/MULTIPLE: CPT | Performed by: INTERNAL MEDICINE

## 2023-01-06 PROCEDURE — 43239 PR EGD, FLEX, W/BIOPSY, SGL/MULTI: ICD-10-PCS | Mod: ,,, | Performed by: INTERNAL MEDICINE

## 2023-01-06 RX ORDER — LIDOCAINE HYDROCHLORIDE 20 MG/ML
INJECTION INTRAVENOUS
Status: DISCONTINUED | OUTPATIENT
Start: 2023-01-06 | End: 2023-01-06

## 2023-01-06 RX ORDER — SODIUM CHLORIDE 9 MG/ML
INJECTION, SOLUTION INTRAVENOUS CONTINUOUS
Status: DISCONTINUED | OUTPATIENT
Start: 2023-01-06 | End: 2023-01-06 | Stop reason: HOSPADM

## 2023-01-06 RX ORDER — ONDANSETRON 2 MG/ML
INJECTION INTRAMUSCULAR; INTRAVENOUS
Status: DISCONTINUED | OUTPATIENT
Start: 2023-01-06 | End: 2023-01-06

## 2023-01-06 RX ORDER — PROPOFOL 10 MG/ML
VIAL (ML) INTRAVENOUS
Status: DISCONTINUED | OUTPATIENT
Start: 2023-01-06 | End: 2023-01-06

## 2023-01-06 RX ORDER — PROPOFOL 10 MG/ML
VIAL (ML) INTRAVENOUS CONTINUOUS PRN
Status: DISCONTINUED | OUTPATIENT
Start: 2023-01-06 | End: 2023-01-06

## 2023-01-06 RX ADMIN — SODIUM CHLORIDE: 0.9 INJECTION, SOLUTION INTRAVENOUS at 08:01

## 2023-01-06 RX ADMIN — SODIUM CHLORIDE: 0.9 INJECTION, SOLUTION INTRAVENOUS at 09:01

## 2023-01-06 RX ADMIN — Medication 200 MCG/KG/MIN: at 09:01

## 2023-01-06 RX ADMIN — SODIUM CHLORIDE: 9 INJECTION, SOLUTION INTRAVENOUS at 08:01

## 2023-01-06 RX ADMIN — LIDOCAINE HYDROCHLORIDE 100 MG: 20 INJECTION INTRAVENOUS at 09:01

## 2023-01-06 RX ADMIN — ONDANSETRON 4 MG: 2 INJECTION INTRAMUSCULAR; INTRAVENOUS at 09:01

## 2023-01-06 RX ADMIN — GLYCOPYRROLATE 0.1 MG: 0.2 INJECTION, SOLUTION INTRAMUSCULAR; INTRAVENOUS at 09:01

## 2023-01-06 RX ADMIN — PROPOFOL 100 MG: 10 INJECTION, EMULSION INTRAVENOUS at 09:01

## 2023-01-06 RX ADMIN — PROPOFOL 50 MG: 10 INJECTION, EMULSION INTRAVENOUS at 09:01

## 2023-01-06 NOTE — H&P
Short Stay Endoscopy History and Physical    PCP - Snehal Reynoso MD    Procedure - EGD  ASA - per anesthesia  Mallampati - per anesthesia  History of Anesthesia problems - no  Family history Anesthesia problems -  no   Plan of anesthesia - General    HPI:  This is a 47 y.o. female here for evaluation of :  iron deficiency anemia, history of H. Pylori, unintentional weight loss  Completed treatment of H. Pylori with clarithromycin, flagyl, PPI back in 09/2022, no repeat testing to this point      ROS:  Constitutional: No fevers, chills  CV: No chest pain  Pulm: No shortness of breath  GI: see HPI  Derm: No rash    Medical History:  has a past medical history of Bipolar 1 disorder, GERD (gastroesophageal reflux disease), History of psychiatric hospitalization, psychiatric care, Psychiatric problem, Seasonal allergies, Therapy, and Vaginal delivery.    Surgical History:  has a past surgical history that includes Finger fracture surgery and Repair of incarcerated ventral hernia without history of prior repair (N/A, 7/23/2019).    Family History: family history includes Bipolar disorder in her maternal aunt and paternal aunt; Breast cancer in an other family member; Diabetes in her mother and another family member; Diabetes type II in her mother; Hyperlipidemia in her mother.. Otherwise no colon cancer, inflammatory bowel disease, or GI malignancies.    Social History:  reports that she has never smoked. She has never used smokeless tobacco. She reports that she does not drink alcohol and does not use drugs.    Review of patient's allergies indicates:   Allergen Reactions    Penicillins Hives and Shortness Of Breath    Morphine        Medications:   Medications Prior to Admission   Medication Sig Dispense Refill Last Dose    cariprazine (VRAYLAR) 1.5 mg Cap Take 1 capsule (1.5 mg total) by mouth once daily. 30 capsule 5     cetirizine (ZYRTEC) 5 MG tablet Take 1 tablet (5 mg total) by mouth once daily. 30 tablet 0      citalopram (CELEXA) 20 MG tablet Take 1 tablet (20 mg total) by mouth every evening. 90 tablet 1     ferrous sulfate (IRON ORAL) Take by mouth.       fluticasone propionate (FLONASE) 50 mcg/actuation nasal spray 1 spray (50 mcg total) by Each Nostril route once daily. 16 g 5     OLANZapine (ZYPREXA) 5 MG tablet Take 1 tablet (5 mg total) by mouth every evening. 90 tablet 1     omeprazole (PRILOSEC) 20 MG capsule Take 20 mg by mouth once daily.       topiramate (TOPAMAX) 50 MG tablet Take 1 tablet (50 mg total) by mouth 2 (two) times daily. 180 tablet 1          Physical Exam:    Vital Signs: There were no vitals filed for this visit.    General Appearance: Well appearing in no acute distress  Eyes:    No scleral icterus  ENT: lips and tongue normal  Lungs: no use of accessory muscles  Heart:  normal rate, regular rhythm  Abdomen: Soft, non tender, non distended   Extremities: no edema  Skin: No rash      Labs:  Lab Results   Component Value Date    WBC 6.15 09/08/2022    HGB 12.3 09/08/2022    HCT 40.1 09/08/2022     09/08/2022    CHOL 199 09/08/2022    TRIG 144 09/08/2022    HDL 48 09/08/2022    ALT 22 09/08/2022    AST 13 09/08/2022     09/08/2022    K 4.0 09/08/2022     09/08/2022    CREATININE 0.8 09/08/2022    BUN 10 09/08/2022    CO2 23 09/08/2022    TSH 2.359 11/07/2020    HGBA1C 5.9 (H) 09/08/2022       I have explained the risks and benefits of endoscopy procedures to the patient including but not limited to bleeding, perforation, infection, and death.  The patient was asked if they understand and allowed to ask any further questions to their satisfaction.    Proceed with EGD    Edi Meade MD

## 2023-01-06 NOTE — PROVATION PATIENT INSTRUCTIONS
Discharge Summary/Instructions after an Endoscopic Procedure  Patient Name: Zonia Skaggs  Patient MRN: [id number]  Patient YOB: 1975  Friday, January 6, 2023  Juan Antonio Mcclelland MD  Dear patient,  As a result of recent federal legislation (The Federal Cures Act), you may   receive lab or pathology results from your procedure in your [facility]   account before your physician is able to contact you. Your physician or   their representative will relay the results to you with their   recommendations at their soonest availability.  Thank you,  RESTRICTIONS:  During your procedure today, you received medications for sedation.  These   medications may affect your judgment, balance and coordination.  Therefore,   for 24 hours, you have the following restrictions:   - DO NOT drive a car, operate machinery, make legal/financial decisions,   sign important papers or drink alcohol.    ACTIVITY:  Today: no heavy lifting, straining or running due to procedural   sedation/anesthesia.  The following day: return to full activity including work.  DIET:  Eat and drink normally unless instructed otherwise.     TREATMENT FOR COMMON SIDE EFFECTS:  - Mild abdominal pain, nausea, belching, bloating or excessive gas:  rest,   eat lightly and use a heating pad.  - Sore Throat: treat with throat lozenges and/or gargle with warm salt   water.  - Because air was used during the procedure, expelling large amounts of air   from your rectum or belching is normal.  - If a bowel prep was taken, you may not have a bowel movement for 1-3 days.    This is normal.  SYMPTOMS TO WATCH FOR AND REPORT TO YOUR PHYSICIAN:  1. Abdominal pain or bloating, other than gas cramps.  2. Chest pain.  3. Back pain.  4. Signs of infection such as: chills or fever occurring within 24 hours   after the procedure.  5. Rectal bleeding, which would show as bright red, maroon, or black stools.   (A tablespoon of blood from the rectum is not serious, especially  if   hemorrhoids are present.)  6. Vomiting.  7. Weakness or dizziness.  GO DIRECTLY TO THE NEAREST EMERGENCY ROOM IF YOU HAVE ANY OF THE FOLLOWING:      Difficulty breathing              Chills and/or fever over 101 F   Persistent vomiting and/or vomiting blood   Severe abdominal pain   Severe chest pain   Black, tarry stools   Bleeding- more than one tablespoon   Any other symptom or condition that you feel may need urgent attention  Your doctor recommends these additional instructions:  If any biopsies were taken, your doctors clinic will contact you in 1 to 2   weeks with any results.  - Discharge patient to home.   - Follow an antireflux regimen.   - Await pathology results.   - Telephone endoscopist for pathology results in 3 weeks.   - Return to referring physician.   - The findings and recommendations were discussed with the patient.  For questions, problems or results please call your physician - Juan Antonio Mcclelland MD at Work:  [phone].  [facility] [place], EMERGENCY ROOM PHONE NUMBER: [phone]  IF A COMPLICATION OR EMERGENCY SITUATION ARISES AND YOU ARE UNABLE TO REACH   YOUR PHYSICIAN - GO DIRECTLY TO THE EMERGENCY ROOM.  Juan Antonio Mcclelland MD  1/6/2023 9:37:16 AM  This report has been verified and signed electronically.  Dear patient,  As a result of recent federal legislation (The Federal Cures Act), you may   receive lab or pathology results from your procedure in your [facility]   account before your physician is able to contact you. Your physician or   their representative will relay the results to you with their   recommendations at their soonest availability.  Thank you,  PROVATION

## 2023-01-06 NOTE — TRANSFER OF CARE
"Anesthesia Transfer of Care Note    Patient: Zonia Skaggs    Procedure(s) Performed: Procedure(s) (LRB):  ESOPHAGOGASTRODUODENOSCOPY (EGD) (N/A)    Patient location: GI    Anesthesia Type: general    Transport from OR: Transported from OR on room air with adequate spontaneous ventilation    Post pain: adequate analgesia    Post assessment: no apparent anesthetic complications    Post vital signs: stable    Level of consciousness: awake    Nausea/Vomiting: no nausea/vomiting    Complications: none    Transfer of care protocol was followed      Last vitals:   Visit Vitals  /73   Pulse 89   Temp 36.7 °C (98.1 °F)   Resp 16   Ht 5' 5" (1.651 m)   Wt 122.5 kg (270 lb)   LMP  (LMP Unknown)   SpO2 97%   Breastfeeding No   BMI 44.93 kg/m²     "

## 2023-01-06 NOTE — ANESTHESIA POSTPROCEDURE EVALUATION
Anesthesia Post Evaluation    Patient: Zonia Skaggs    Procedure(s) Performed: Procedure(s) (LRB):  ESOPHAGOGASTRODUODENOSCOPY (EGD) (N/A)    Final Anesthesia Type: general      Patient location during evaluation: PACU  Patient participation: Yes- Able to Participate  Level of consciousness: awake and alert and oriented  Post-procedure vital signs: reviewed and stable  Pain management: adequate  Airway patency: patent    PONV status at discharge: No PONV  Anesthetic complications: no      Cardiovascular status: blood pressure returned to baseline  Respiratory status: unassisted, room air and spontaneous ventilation  Hydration status: euvolemic  Follow-up not needed.          Vitals Value Taken Time   /67 01/06/23 0959   Temp 36.7 °C (98.1 °F) 01/06/23 0948   Pulse 79 01/06/23 0959   Resp 16 01/06/23 0959   SpO2 100 % 01/06/23 0959         No case tracking events are documented in the log.      Pain/Jovita Score: Jovita Score: 10 (1/6/2023  9:49 AM)

## 2023-01-06 NOTE — ANESTHESIA PREPROCEDURE EVALUATION
01/06/2023  Zonia Skaggs is a 47 y.o., female.  Past Medical History:   Diagnosis Date    Bipolar 1 disorder     GERD (gastroesophageal reflux disease)     History of psychiatric hospitalization     Hx of psychiatric care     Psychiatric problem     Seasonal allergies     Therapy     used to follow with Dr. Gerber Rogers    Vaginal delivery     x5     Past Surgical History:   Procedure Laterality Date    FINGER FRACTURE SURGERY      REPAIR OF INCARCERATED VENTRAL HERNIA WITHOUT HISTORY OF PRIOR REPAIR N/A 7/23/2019    Procedure: REPAIR, HERNIA, VENTRAL, INCARCERATED, WITHOUT HISTORY OF PRIOR REPAIR;  Surgeon: Bandar Madden MD;  Location: [facility];  Service: General;  Laterality: N/A;         Pre-op Assessment    I have reviewed the Patient Summary Reports.     I have reviewed the Nursing Notes. I have reviewed the NPO Status.   I have reviewed the Medications.     Review of Systems  Anesthesia Hx:  No problems with previous Anesthesia  Denies Family Hx of Anesthesia complications.   Denies Personal Hx of Anesthesia complications.   Hematology/Oncology:  Hematology Normal   Oncology Normal     EENT/Dental:EENT/Dental Normal   Cardiovascular:  Cardiovascular Normal     Pulmonary:  Pulmonary Normal    Renal/:  Renal/ Normal     Hepatic/GI:   Bowel Prep. GERD    Musculoskeletal:  Musculoskeletal Normal    Neurological:  Neurology Normal    Endocrine:  Endocrine Normal  Morbid Obesity / BMI > 40  Dermatological:  Skin Normal    Psych:   Psychiatric History          Physical Exam  General: Well nourished    Airway:  Mallampati: IV / III  Mouth Opening: Normal  TM Distance: > 6 cm  Tongue: Normal, Large  Neck ROM: Flexion Decreased    Dental:  Intact        Anesthesia Plan  Type of Anesthesia, risks & benefits discussed:    Anesthesia Type: Gen Natural Airway  Intra-op Monitoring Plan: Standard ASA  Monitors  Airway Plan: Direct  Informed Consent: Informed consent signed with the Patient and all parties understand the risks and agree with anesthesia plan.  All questions answered.   ASA Score: 2  Day of Surgery Review of History & Physical: H&P Update referred to the surgeon/provider.I have interviewed and examined the patient. I have reviewed the patient's H&P dated: There are no significant changes.     Ready For Surgery From Anesthesia Perspective.     .

## 2023-01-11 DIAGNOSIS — Z12.31 OTHER SCREENING MAMMOGRAM: ICD-10-CM

## 2023-01-11 LAB
FINAL PATHOLOGIC DIAGNOSIS: NORMAL
GROSS: NORMAL
Lab: NORMAL

## 2023-01-17 ENCOUNTER — PATIENT MESSAGE (OUTPATIENT)
Dept: GASTROENTEROLOGY | Facility: CLINIC | Age: 48
End: 2023-01-17
Payer: COMMERCIAL

## 2023-01-17 ENCOUNTER — PATIENT MESSAGE (OUTPATIENT)
Dept: FAMILY MEDICINE | Facility: CLINIC | Age: 48
End: 2023-01-17
Payer: COMMERCIAL

## 2023-01-18 ENCOUNTER — PATIENT MESSAGE (OUTPATIENT)
Dept: ADMINISTRATIVE | Facility: HOSPITAL | Age: 48
End: 2023-01-18
Payer: COMMERCIAL

## 2023-01-18 ENCOUNTER — PATIENT MESSAGE (OUTPATIENT)
Dept: GASTROENTEROLOGY | Facility: CLINIC | Age: 48
End: 2023-01-18
Payer: COMMERCIAL

## 2023-01-18 ENCOUNTER — OFFICE VISIT (OUTPATIENT)
Dept: PSYCHIATRY | Facility: CLINIC | Age: 48
End: 2023-01-18
Payer: COMMERCIAL

## 2023-01-18 DIAGNOSIS — B96.81 HELICOBACTER PYLORI GASTRITIS: Primary | ICD-10-CM

## 2023-01-18 DIAGNOSIS — K29.70 HELICOBACTER PYLORI GASTRITIS: Primary | ICD-10-CM

## 2023-01-18 DIAGNOSIS — F31.77 BIPOLAR I DISORDER, MOST RECENT EPISODE MIXED, IN PARTIAL REMISSION: Primary | ICD-10-CM

## 2023-01-18 PROCEDURE — 90834 PSYTX W PT 45 MINUTES: CPT | Mod: 95,,, | Performed by: SOCIAL WORKER

## 2023-01-18 PROCEDURE — 90834 PR PSYCHOTHERAPY W/PATIENT, 45 MIN: ICD-10-PCS | Mod: 95,,, | Performed by: SOCIAL WORKER

## 2023-01-18 RX ORDER — OMEPRAZOLE 20 MG/1
20 CAPSULE, DELAYED RELEASE ORAL EVERY 12 HOURS
Qty: 20 CAPSULE | Refills: 0 | Status: SHIPPED | OUTPATIENT
Start: 2023-01-18 | End: 2023-01-28

## 2023-01-18 RX ORDER — METRONIDAZOLE 250 MG/1
250 TABLET ORAL EVERY 6 HOURS
Qty: 40 TABLET | Refills: 0 | Status: SHIPPED | OUTPATIENT
Start: 2023-01-18 | End: 2023-01-28

## 2023-01-18 RX ORDER — DOXYCYCLINE HYCLATE 100 MG
100 TABLET ORAL EVERY 12 HOURS
Qty: 20 TABLET | Refills: 0 | Status: SHIPPED | OUTPATIENT
Start: 2023-01-18 | End: 2023-01-28

## 2023-01-18 NOTE — PROGRESS NOTES
"  The patient location is: home  The chief complaint leading to consultation is: depression, anxiety    Visit type: audiovisual    Face to Face time with patient: 50 minutes  60 minutes of total time spent on the encounter, which includes face to face time and non-face to face time preparing to see the patient (eg, review of tests), Obtaining and/or reviewing separately obtained history, Documenting clinical information in the electronic or other health record, Independently interpreting results (not separately reported) and communicating results to the patient/family/caregiver, or Care coordination (not separately reported).     Each patient to whom he or she provides medical services by telemedicine is:  (1) informed of the relationship between the physician and patient and the respective role of any other health care provider with respect to management of the patient; and (2) notified that he or she may decline to receive medical services by telemedicine and may withdraw from such care at any time.    Notes: Patient returns for psychotherapy. Patient reports that she is doing "alright". Reports that recently the children were sick and that has been hard. States that recently gave a friend an old ipad mini. States that this friend has addiction and turned around and sold it. States that the person that he sold it to contacted her because there was still pictures of her children on the ipad. States that it has been hard for her because  doesn't want her to talk to him. Discussed that she had a colonoscopy and they found a polyp. States that results also said that she possibly has colitis but wasn't sure. Discussed that step son has also come back into their lives. States that he has been asking about why  didn't come around or try to contact him. States that they are working on getting him and his children moved to the [place]. Discussed communication problems with . Discussed different skills to " help with communicating about needs and wants with him better.       Encounter Diagnosis   Name Primary?    Bipolar I disorder, most recent episode mixed, in partial remission Yes         Cassandra Rockweiler, [facility]

## 2023-01-19 ENCOUNTER — TELEPHONE (OUTPATIENT)
Dept: PULMONOLOGY | Facility: CLINIC | Age: 48
End: 2023-01-19
Payer: COMMERCIAL

## 2023-01-19 NOTE — TELEPHONE ENCOUNTER
Spoke with patient she stated that she received supplies but the filters never came in, told her to call [facility] to ask them to ship her filters.                  ----- Message from Tabitha Belcher sent at 1/19/2023  3:14 PM CST -----  Regarding: Orders  Contact: CRISTIAN SKAGGS [[id number]]  Name of Who is Calling: Cristian Skaggs            What is the request in detail: Pt states she is requesting a call back in regards to getting orders put for more filters. She states  she will be out soon., Please advise           Can the clinic reply by [facility]:   No         What Number to Call Back if not in [facility]: Home Phone      [phone]  Work Phone      Not on file.  Mobile          [phone]

## 2023-01-19 NOTE — PROGRESS NOTES
Edwin- Please tell patient that their EGD pathology is positive for and H. Pylori infection and recommend Quadruple therapy treatment for H. Pylori with:    Omeprazole 20mg 12 hours for 10 days  Doxycycline 100mg every 12 hours for 10 days  Metronidazole 250mg every 6 hours for 10 days  Bismuth subsalicylate 524mg every 6 hours for 10days.     Don't drink alcohol on these medicines. Take after meals and drink plenty of water to make sure all pills clear esophagus.      Please mail patient UpToDate patient information on H. Pylori and have patient read this prior to beginning treatment.    Edwin - please order stool for H. Pylori Antigen in 12 weeks.    Please tell patient to check for H. Pylori eradiation we would like to check stool sample in 12 weeks which tells us if H. Pylori has been successfully eradicated with the prior treatment medications.      H. Pylori antigen test which tells us if H. Pylori has been successfully eradicated with the prior treatment medications, and please tell patient that Stool H. Pylori antigen needs to be done off the following medications for the following amount of time:    1. Off all Antibiotics for 4 (Four) weeks before stool collection.      2. Off all Proton Pump Inhibitors medications for 2 (Two) weeks before collecting stool for H. Pylori Antigen:  :  Nexium (esomeprazole)  Prilosec (omeprazole)   Protonix (pantoprazole)  Prevacid (lansoprazole)  Aciphex (rabeprazole)  Dexilant (dexlansoprazole)    Zegerid     3. Off all H2 blockers medications for 2 (Two) weeks before collecting stool for H. Pylori Antigen:    Zantac (ranitidine)  Tagamet (cimetadine)  Axid (nizatidine)   Pepcid (famotidine)    4. Off Pepto-Bismol for 4 (four) weeks prior to collecting stool for H. Pylori Antigen.    Edwin - please mail this to patient so they can follow it for their stool for H. pylori antigen collection.      1. Duodenum, biopsy:       -  Duodenal mucosa with no significant pathologic  findings   Comment:   No significant villous architectural distortion or epithelial   lymphocytosis is identified to suggest celiac disease. No granulomas,   parasitic organisms or viral inclusions are identified.  There is no evidence   of dysplasia or malignancy.   2. Gastric biopsy:       -  Gastric mucosa with moderate chronic superficial gastritis with focal   activity       -  Routine H&E stain is POSITIVE for organisms consistent with   Helicobacter pylori   Comment:  Negative for glandular atrophy or intestinal metaplasia.  Negative   for dysplasia or malignancy.   3. Gastric polyp, biopsy:       -  Polypoid fragment of gastric oxyntic mucosa showing moderate chronic   colitis with activity       -  Routine H&E stain is POSITIVE for organisms consistent with   Helicobacter pylori    Comment: Interp By Sarah Warner M.D., Signed on 01/11/2023

## 2023-02-15 ENCOUNTER — OFFICE VISIT (OUTPATIENT)
Dept: PSYCHIATRY | Facility: CLINIC | Age: 48
End: 2023-02-15
Payer: COMMERCIAL

## 2023-02-15 DIAGNOSIS — F31.77 BIPOLAR I DISORDER, MOST RECENT EPISODE MIXED, IN PARTIAL REMISSION: Primary | ICD-10-CM

## 2023-02-15 PROCEDURE — 90834 PSYTX W PT 45 MINUTES: CPT | Mod: 95,,, | Performed by: SOCIAL WORKER

## 2023-02-15 PROCEDURE — 90834 PR PSYCHOTHERAPY W/PATIENT, 45 MIN: ICD-10-PCS | Mod: 95,,, | Performed by: SOCIAL WORKER

## 2023-02-15 NOTE — PROGRESS NOTES
"  The patient location is: home  The chief complaint leading to consultation is: depression, anxiety    Visit type: audiovisual    Face to Face time with patient: 50 minutes  60 minutes of total time spent on the encounter, which includes face to face time and non-face to face time preparing to see the patient (eg, review of tests), Obtaining and/or reviewing separately obtained history, Documenting clinical information in the electronic or other health record, Independently interpreting results (not separately reported) and communicating results to the patient/family/caregiver, or Care coordination (not separately reported).     Each patient to whom he or she provides medical services by telemedicine is:  (1) informed of the relationship between the physician and patient and the respective role of any other health care provider with respect to management of the patient; and (2) notified that he or she may decline to receive medical services by telemedicine and may withdraw from such care at any time.    Notes: Patient returns for psychotherapy. Patient reports that she is doing "pretty good". States that things with the children and  have been going pretty well. States that  did make a comment about patient being a "fat ass". This was very upsetting to patient. 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 States that she has spoken to daughter a couple weeks ago and is going to be in [place] soon. Discussed with patient that Dr. Whitehead is leaving and possible follow up.       Encounter Diagnosis   Name Primary?    Bipolar I disorder, most recent episode mixed, in partial remission Yes     Cassandra Rockweiler, LCS-NEDA            "

## 2023-02-16 ENCOUNTER — PATIENT MESSAGE (OUTPATIENT)
Dept: PSYCHIATRY | Facility: CLINIC | Age: 48
End: 2023-02-16

## 2023-02-16 ENCOUNTER — OFFICE VISIT (OUTPATIENT)
Dept: PSYCHIATRY | Facility: CLINIC | Age: 48
End: 2023-02-16
Payer: COMMERCIAL

## 2023-02-16 ENCOUNTER — TELEPHONE (OUTPATIENT)
Dept: PSYCHIATRY | Facility: CLINIC | Age: 48
End: 2023-02-16
Payer: COMMERCIAL

## 2023-02-16 DIAGNOSIS — F31.77 BIPOLAR I DISORDER, MOST RECENT EPISODE MIXED, IN PARTIAL REMISSION: Primary | ICD-10-CM

## 2023-02-16 DIAGNOSIS — F43.23 ADJUSTMENT DISORDER WITH MIXED ANXIETY AND DEPRESSED MOOD: ICD-10-CM

## 2023-02-16 PROCEDURE — 1160F RVW MEDS BY RX/DR IN RCRD: CPT | Mod: CPTII,95,, | Performed by: PSYCHIATRY & NEUROLOGY

## 2023-02-16 PROCEDURE — 99214 PR OFFICE/OUTPT VISIT, EST, LEVL IV, 30-39 MIN: ICD-10-PCS | Mod: 95,,, | Performed by: PSYCHIATRY & NEUROLOGY

## 2023-02-16 PROCEDURE — 1159F PR MEDICATION LIST DOCUMENTED IN MEDICAL RECORD: ICD-10-PCS | Mod: CPTII,95,, | Performed by: PSYCHIATRY & NEUROLOGY

## 2023-02-16 PROCEDURE — 1159F MED LIST DOCD IN RCRD: CPT | Mod: CPTII,95,, | Performed by: PSYCHIATRY & NEUROLOGY

## 2023-02-16 PROCEDURE — 1160F PR REVIEW ALL MEDS BY PRESCRIBER/CLIN PHARMACIST DOCUMENTED: ICD-10-PCS | Mod: CPTII,95,, | Performed by: PSYCHIATRY & NEUROLOGY

## 2023-02-16 PROCEDURE — 99214 OFFICE O/P EST MOD 30 MIN: CPT | Mod: 95,,, | Performed by: PSYCHIATRY & NEUROLOGY

## 2023-02-16 RX ORDER — CARIPRAZINE 1.5 MG/1
1.5 CAPSULE, GELATIN COATED ORAL DAILY
Qty: 30 CAPSULE | Refills: 5 | Status: SHIPPED | OUTPATIENT
Start: 2023-02-16 | End: 2023-06-06 | Stop reason: SDUPTHER

## 2023-02-16 RX ORDER — TOPIRAMATE 50 MG/1
50 TABLET, FILM COATED ORAL 2 TIMES DAILY
Qty: 180 TABLET | Refills: 1 | Status: SHIPPED | OUTPATIENT
Start: 2023-02-16 | End: 2023-06-06 | Stop reason: SDUPTHER

## 2023-02-16 RX ORDER — CITALOPRAM 20 MG/1
20 TABLET, FILM COATED ORAL NIGHTLY
Qty: 90 TABLET | Refills: 1 | Status: SHIPPED | OUTPATIENT
Start: 2023-02-16 | End: 2023-06-06 | Stop reason: SDUPTHER

## 2023-02-16 RX ORDER — BUPROPION HYDROCHLORIDE 150 MG/1
150 TABLET ORAL DAILY
Qty: 30 TABLET | Refills: 2 | Status: SHIPPED | OUTPATIENT
Start: 2023-02-16 | End: 2023-06-20 | Stop reason: SDUPTHER

## 2023-02-16 NOTE — TELEPHONE ENCOUNTER
Pt called, lvm. Returned call, appt made w/ nick to f/u appt w/ Dr. Whitehead. No concerns voiced.

## 2023-02-16 NOTE — PROGRESS NOTES
"Outpatient Psychiatry Follow-Up Visit (MD/NP)    2/16/2023     The patient location is: home; [place], [place]  The chief complaint leading to consultation is: mood changes; anxiety    Visit type: audiovisual    Face to Face time with patient: 25 minutes  35 minutes of total time spent on the encounter, which includes face to face time and non-face to face time preparing to see the patient (eg, review of tests), Obtaining and/or reviewing separately obtained history, Documenting clinical information in the electronic or other health record, Independently interpreting results (not separately reported) and communicating results to the patient/family/caregiver, or Care coordination (not separately reported).         Each patient to whom he or she provides medical services by telemedicine is:  (1) informed of the relationship between the physician and patient and the respective role of any other health care provider with respect to management of the patient; and (2) notified that he or she may decline to receive medical services by telemedicine and may withdraw from such care at any time.      Clinical Status of Patient:  Outpatient (Ambulatory)    Chief Complaint:  Zonia Skaggs is a 47 y.o. female who presents today for follow-up of mood disorder.  Met with patient.      Interval History and Content of Current Session:  Interim Events/Subjective Report/Content of Current Session: Patient Zonia Skaggs presents to clinic for follow up.  She has been more compliant with the meds.  Says that her mood has been much better and not having and "bipolar" or psychotic moments.  Admits that she is still not motivated to want to do anything and isolates at home.  She has no energy or motivation.  Stopped working out, napping more.  Although, she feels "mentally good".  "Vraylar is awesome."  Says that she is not raising her voice as she did in the past.  Stressed with recently having an upper GI and found to have H.pylori again.  " Also stressed with daughter on deployment on the other side of the world, with a new boyfriend.  Says that her  had a son prior to their marriage and they recently connected with him, and he has children.  They are now grandparents.  No side effects from medications.  Hasn't taken an olanzapine in many months.      In the past has failed ziprasidone, quetiapine, olanzapine.    Psychotherapy:  Target symptoms: mood disorder  Why chosen therapy is appropriate versus another modality: relevant to diagnosis  Outcome monitoring methods: self-report, observation  Therapeutic intervention type: supportive psychotherapy  Topics discussed/themes: building skills sets for symptom management, symptom recognition  The patient's response to the intervention is accepting. The patient's progress toward treatment goals is limited.   Duration of intervention: 15 minutes.    Review of Systems   PSYCHIATRIC: Pertinant items are noted in the narrative.  CONSTITUTIONAL: Positive for weight gain.   MUSCULOSKELETAL: No pain or stiffness of the joints.  NEUROLOGIC: No weakness, sensory changes, seizures, confusion, memory loss, tremor or other abnormal movements.  RESPIRATORY: No shortness of breath.  CARDIOVASCULAR: No tachycardia or chest pain.  GASTROINTESTINAL: No nausea, vomiting, pain, constipation or diarrhea.    Past Medical, Family and Social History: The patient's past medical, family and social history have been reviewed and updated as appropriate within the electronic medical record - see encounter notes.    Compliance:  Not fully compliant with medications because she does not want to take medications daily.    Side effects: None    Risk Parameters:  Patient reports no suicidal ideation  Patient reports no homicidal ideation  Patient reports no self-injurious behavior  Patient reports no violent behavior    Exam (detailed: at least 9 elements; comprehensive: all 15 elements)   Constitutional  Vitals:  Most recent vital  signs, dated less than 90 days prior to this appointment, were reviewed.   There were no vitals filed for this visit.     General:  age appropriate, overweight     Musculoskeletal  Muscle Strength/Tone:  no tremor, no tic   Gait & Station:  not observed; video visit     Psychiatric  Speech:  no latency; no press   Mood & Affect:  steady  congruent and appropriate   Thought Process:  normal and logical   Associations:  intact   Thought Content:  normal, no suicidality, no homicidality, delusions, or paranoia   Insight:  has awareness of illness   Judgement: behavior is adequate to circumstances   Orientation:  person, place, situation, time/date   Memory: intact for content of interview   Language: grossly intact   Attention Span & Concentration:  able to focus   Fund of Knowledge:  intact and appropriate to age and level of education     Assessment and Diagnosis   Status/Progress: Based on the examination today, the patient's problem(s) is/are inadequately controlled.  New problems have been presented today.   Co-morbidities are complicating management of the primary condition.  There are no active rule-out diagnoses for this patient at this time.     General Impression: We will continue pharmacological intervention and adjunctive therapy.       ICD-10-CM ICD-9-CM   1. Bipolar I disorder, most recent episode mixed, in partial remission  F31.77 296.65   2. Adjustment disorder with mixed anxiety and depressed mood  F43.23 309.28       Intervention/Counseling/Treatment Plan   Medication Management: Continue current medications. The risks and benefits of medication were discussed with the patient.  Counseling provided with patient as follows: importance of compliance with chosen treatment options was emphasized, risks and benefits of treatment options, including medications, were discussed with the patient, risk factor reduction, prognosis, patient education, instructions for  management, treatment and follow-up were  reviewed  1.  Continue topiramate 50mg BID (taking it every night and most days) targeting mood stabilization if she would like to start.  Warned of risk of word finding difficulties.  She does not have to take the dose at bedtime but yet take the 2nd dose in the middle of the day.  2.  Continue Vraylar 1.5 mg daily targeting bipolar mood stabilization.  Warned of risk of TD, EPS, metabolic syndrome.  3.  Continue citalopram 20 mg p.o. daily targeting depression and anxiety.  Warned of risk of walt, suicidality, serotonin syndrome.  This may be contributing to her sleepiness so will change to another SSRI.  4.  Start Wellbutrin  mg daily targeting depression and motivation.  Warned of risk of walt, suicidality, serotonin syndrome, seizures.  5.  Continue very p.r.n. use of olanzapine 5 mg nightly targeting mood stabilization.  Warned of risk of TD, EPS, metabolic syndrome, over-sedation, weight gain.  6. Continue with meditation and individual therapy.    7.  Questionable compliance with meds.      Return to Clinic: 6 months, as needed

## 2023-02-16 NOTE — PATIENT INSTRUCTIONS

## 2023-02-17 ENCOUNTER — PATIENT MESSAGE (OUTPATIENT)
Dept: PSYCHIATRY | Facility: CLINIC | Age: 48
End: 2023-02-17
Payer: COMMERCIAL

## 2023-02-28 ENCOUNTER — PATIENT MESSAGE (OUTPATIENT)
Dept: PSYCHIATRY | Facility: CLINIC | Age: 48
End: 2023-02-28
Payer: COMMERCIAL

## 2023-03-09 ENCOUNTER — CLINICAL SUPPORT (OUTPATIENT)
Dept: ENDOSCOPY | Facility: HOSPITAL | Age: 48
End: 2023-03-09
Attending: INTERNAL MEDICINE
Payer: COMMERCIAL

## 2023-03-09 VITALS — HEIGHT: 65 IN | WEIGHT: 170 LBS | BODY MASS INDEX: 28.32 KG/M2

## 2023-03-09 DIAGNOSIS — Z12.11 SCREENING FOR COLON CANCER: ICD-10-CM

## 2023-03-09 RX ORDER — POLYETHYLENE GLYCOL 3350, SODIUM SULFATE ANHYDROUS, SODIUM BICARBONATE, SODIUM CHLORIDE, POTASSIUM CHLORIDE 236; 22.74; 6.74; 5.86; 2.97 G/4L; G/4L; G/4L; G/4L; G/4L
4 POWDER, FOR SOLUTION ORAL ONCE
Qty: 4000 ML | Refills: 0 | Status: SHIPPED | OUTPATIENT
Start: 2023-03-09 | End: 2023-03-09

## 2023-04-20 ENCOUNTER — ANESTHESIA EVENT (OUTPATIENT)
Dept: ENDOSCOPY | Facility: HOSPITAL | Age: 48
End: 2023-04-20
Payer: COMMERCIAL

## 2023-04-20 ENCOUNTER — ANESTHESIA (OUTPATIENT)
Dept: ENDOSCOPY | Facility: HOSPITAL | Age: 48
End: 2023-04-20
Payer: COMMERCIAL

## 2023-04-20 ENCOUNTER — HOSPITAL ENCOUNTER (OUTPATIENT)
Facility: HOSPITAL | Age: 48
Discharge: HOME | End: 2023-04-20
Attending: INTERNAL MEDICINE | Admitting: INTERNAL MEDICINE
Payer: COMMERCIAL

## 2023-04-20 VITALS
BODY MASS INDEX: 43.39 KG/M2 | DIASTOLIC BLOOD PRESSURE: 78 MMHG | HEART RATE: 82 BPM | WEIGHT: 270 LBS | TEMPERATURE: 97 F | HEIGHT: 66 IN | RESPIRATION RATE: 16 BRPM | SYSTOLIC BLOOD PRESSURE: 132 MMHG | OXYGEN SATURATION: 99 %

## 2023-04-20 DIAGNOSIS — Z12.11 SCREEN FOR COLON CANCER: ICD-10-CM

## 2023-04-20 LAB
B-HCG UR QL: NEGATIVE
CTP QC/QA: YES

## 2023-04-20 PROCEDURE — 27201089 HC SNARE, DISP (ANY): Performed by: INTERNAL MEDICINE

## 2023-04-20 PROCEDURE — 45388 COLONOSCOPY W/ABLATION: CPT | Mod: PT,,, | Performed by: INTERNAL MEDICINE

## 2023-04-20 PROCEDURE — 45388 COLONOSCOPY W/ABLATION: CPT | Mod: PT | Performed by: INTERNAL MEDICINE

## 2023-04-20 PROCEDURE — 37000008 HC ANESTHESIA 1ST 15 MINUTES: Performed by: INTERNAL MEDICINE

## 2023-04-20 PROCEDURE — 45381 COLONOSCOPY SUBMUCOUS NJX: CPT | Mod: PT,,, | Performed by: INTERNAL MEDICINE

## 2023-04-20 PROCEDURE — 81025 URINE PREGNANCY TEST: CPT | Performed by: INTERNAL MEDICINE

## 2023-04-20 PROCEDURE — 25000003 PHARM REV CODE 250: Performed by: INTERNAL MEDICINE

## 2023-04-20 PROCEDURE — 25000003 PHARM REV CODE 250: Performed by: NURSE ANESTHETIST, CERTIFIED REGISTERED

## 2023-04-20 PROCEDURE — 45385 COLONOSCOPY W/LESION REMOVAL: CPT | Mod: 59,PT,, | Performed by: INTERNAL MEDICINE

## 2023-04-20 PROCEDURE — 45388: ICD-10-PCS | Mod: PT,,, | Performed by: INTERNAL MEDICINE

## 2023-04-20 PROCEDURE — 88305 TISSUE EXAM BY PATHOLOGIST: CPT | Performed by: PATHOLOGY

## 2023-04-20 PROCEDURE — E9220 PRA ENDO ANESTHESIA: ICD-10-PCS | Mod: 33,,, | Performed by: NURSE ANESTHETIST, CERTIFIED REGISTERED

## 2023-04-20 PROCEDURE — 63600175 PHARM REV CODE 636 W HCPCS: Performed by: NURSE ANESTHETIST, CERTIFIED REGISTERED

## 2023-04-20 PROCEDURE — 37000009 HC ANESTHESIA EA ADD 15 MINS: Performed by: INTERNAL MEDICINE

## 2023-04-20 PROCEDURE — 45385 COLONOSCOPY W/LESION REMOVAL: CPT | Mod: PT,59 | Performed by: INTERNAL MEDICINE

## 2023-04-20 PROCEDURE — E9220 PRA ENDO ANESTHESIA: HCPCS | Mod: 33,,, | Performed by: NURSE ANESTHETIST, CERTIFIED REGISTERED

## 2023-04-20 PROCEDURE — 88305 TISSUE EXAM BY PATHOLOGIST: ICD-10-PCS | Mod: 26,,, | Performed by: PATHOLOGY

## 2023-04-20 PROCEDURE — 45385 PR COLONOSCOPY,REMV LESN,SNARE: ICD-10-PCS | Mod: 59,PT,, | Performed by: INTERNAL MEDICINE

## 2023-04-20 PROCEDURE — 45381 PR COLONOSCPY,FLEX,W/DIR SUBMUC INJECT: ICD-10-PCS | Mod: PT,,, | Performed by: INTERNAL MEDICINE

## 2023-04-20 PROCEDURE — 88305 TISSUE EXAM BY PATHOLOGIST: CPT | Mod: 26,,, | Performed by: PATHOLOGY

## 2023-04-20 PROCEDURE — 27201028 HC NEEDLE, SCLERO: Performed by: INTERNAL MEDICINE

## 2023-04-20 PROCEDURE — 45381 COLONOSCOPY SUBMUCOUS NJX: CPT | Mod: PT | Performed by: INTERNAL MEDICINE

## 2023-04-20 RX ORDER — SODIUM CHLORIDE 9 MG/ML
INJECTION, SOLUTION INTRAVENOUS CONTINUOUS
Status: DISCONTINUED | OUTPATIENT
Start: 2023-04-20 | End: 2023-04-20 | Stop reason: HOSPADM

## 2023-04-20 RX ORDER — PROPOFOL 10 MG/ML
VIAL (ML) INTRAVENOUS CONTINUOUS PRN
Status: DISCONTINUED | OUTPATIENT
Start: 2023-04-20 | End: 2023-04-20

## 2023-04-20 RX ORDER — LIDOCAINE HYDROCHLORIDE 20 MG/ML
INJECTION INTRAVENOUS
Status: DISCONTINUED | OUTPATIENT
Start: 2023-04-20 | End: 2023-04-20

## 2023-04-20 RX ORDER — PROPOFOL 10 MG/ML
VIAL (ML) INTRAVENOUS
Status: DISCONTINUED | OUTPATIENT
Start: 2023-04-20 | End: 2023-04-20

## 2023-04-20 RX ADMIN — SODIUM CHLORIDE: 9 INJECTION, SOLUTION INTRAVENOUS at 11:04

## 2023-04-20 RX ADMIN — LIDOCAINE HYDROCHLORIDE 50 MG: 20 INJECTION INTRAVENOUS at 01:04

## 2023-04-20 RX ADMIN — Medication 150 MCG/KG/MIN: at 01:04

## 2023-04-20 RX ADMIN — PROPOFOL 100 MG: 10 INJECTION, EMULSION INTRAVENOUS at 01:04

## 2023-04-20 NOTE — [employer] PATIENT INSTRUCTIONS
Discharge Summary/Instructions after an Endoscopic Procedure  Patient Name: Zonia Skaggs  Patient MRN: [id number]  Patient YOB: 1975  Thursday, April 20, 2023  Juan Antonio Mcclelland MD  Dear patient,  As a result of recent federal legislation (The Federal Cures Act), you may   receive lab or pathology results from your procedure in your [facility]   account before your physician is able to contact you. Your physician or   their representative will relay the results to you with their   recommendations at their soonest availability.  Thank you,  RESTRICTIONS:  During your procedure today, you received medications for sedation.  These   medications may affect your judgment, balance and coordination.  Therefore,   for 24 hours, you have the following restrictions:   - DO NOT drive a car, operate machinery, make legal/financial decisions,   sign important papers or drink alcohol.    ACTIVITY:  Today: no heavy lifting, straining or running due to procedural   sedation/anesthesia.  The following day: return to full activity including work.  DIET:  Eat and drink normally unless instructed otherwise.     TREATMENT FOR COMMON SIDE EFFECTS:  - Mild abdominal pain, nausea, belching, bloating or excessive gas:  rest,   eat lightly and use a heating pad.  - Sore Throat: treat with throat lozenges and/or gargle with warm salt   water.  - Because air was used during the procedure, expelling large amounts of air   from your rectum or belching is normal.  - If a bowel prep was taken, you may not have a bowel movement for 1-3 days.    This is normal.  SYMPTOMS TO WATCH FOR AND REPORT TO YOUR PHYSICIAN:  1. Abdominal pain or bloating, other than gas cramps.  2. Chest pain.  3. Back pain.  4. Signs of infection such as: chills or fever occurring within 24 hours   after the procedure.  5. Rectal bleeding, which would show as bright red, maroon, or black stools.   (A tablespoon of blood from the rectum is not serious, especially  if   hemorrhoids are present.)  6. Vomiting.  7. Weakness or dizziness.  GO DIRECTLY TO THE NEAREST EMERGENCY ROOM IF YOU HAVE ANY OF THE FOLLOWING:      Difficulty breathing              Chills and/or fever over 101 F   Persistent vomiting and/or vomiting blood   Severe abdominal pain   Severe chest pain   Black, tarry stools   Bleeding- more than one tablespoon   Any other symptom or condition that you feel may need urgent attention  Your doctor recommends these additional instructions:  If any biopsies were taken, your doctors clinic will contact you in 1 to 2   weeks with any results.  - Discharge patient to home.   - Await pathology results.   - Telephone endoscopist for pathology results in 3 weeks.   - Repeat colonoscopy in 6 months for surveillance after piecemeal   polypectomy.   - Return to GI clinic.   - for the next 4 weeks only Consider avoiding all non-steroidal   anti-inflammatory drugs (mobic, ibuprofen, naproxen, etc.), unless needed   for cardiovascular protection.  O.K. to take aspirin if needed for   cardiovascular protection.  - Return to referring physician.   - The findings and recommendations were discussed with the patient.  For questions, problems or results please call your physician - Juan Antonio Mcclelland MD at Work:  [phone].  [facility], EMERGENCY ROOM PHONE NUMBER: [phone]  IF A COMPLICATION OR EMERGENCY SITUATION ARISES AND YOU ARE UNABLE TO REACH   YOUR PHYSICIAN - GO DIRECTLY TO THE EMERGENCY ROOM.  Juan Antonio Mcclelland MD  4/20/2023 2:19:48 PM  This report has been verified and signed electronically.  Dear patient,  As a result of recent federal legislation (The Federal Cures Act), you may   receive lab or pathology results from your procedure in your [facility]   account before your physician is able to contact you. Your physician or   their representative will relay the results to you with their   recommendations at their soonest availability.  Thank  you,  [employer]ATION

## 2023-04-20 NOTE — ANESTHESIA PREPROCEDURE EVALUATION
04/20/2023  Zonia Skaggs is a 47 y.o., female.    Patient Active Problem List   Diagnosis    Boxer's fracture, closed, initial encounter    Bipolar disorder, current episode manic severe with psychotic features    Iron deficiency anemia    Lower abdominal pain    Homicidal ideations    Laceration of right eye    Borderline diabetes    Seasonal allergic rhinitis    Gastroesophageal reflux disease without esophagitis    Abnormal urinalysis    Sleep-related breathing disorder     Past Surgical History:   Procedure Laterality Date    ESOPHAGOGASTRODUODENOSCOPY N/A 1/6/2023    Procedure: ESOPHAGOGASTRODUODENOSCOPY (EGD);  Surgeon: Juan Antonio Mcclelland MD;  Location: [address]);  Service: Endoscopy;  Laterality: N/A;  instr portal-GT    FINGER FRACTURE SURGERY      REPAIR OF INCARCERATED VENTRAL HERNIA WITHOUT HISTORY OF PRIOR REPAIR N/A 7/23/2019    Procedure: REPAIR, HERNIA, VENTRAL, INCARCERATED, WITHOUT HISTORY OF PRIOR REPAIR;  Surgeon: Bandar Madden MD;  Location: [facility];  Service: General;  Laterality: N/A;     Past Medical History:   Diagnosis Date    Bipolar 1 disorder     GERD (gastroesophageal reflux disease)     History of psychiatric hospitalization     Hx of psychiatric care     Psychiatric problem     Seasonal allergies     Therapy     used to follow with Dr. Gerber Rogers    Vaginal delivery     x5           Pre-op Assessment    I have reviewed the Patient Summary Reports.       I have reviewed the Medications.     Review of Systems      Physical Exam  General: Well nourished, Alert and Oriented    Airway:  Mallampati: II / I  Mouth Opening: Normal  TM Distance: Normal  Tongue: Normal  Neck ROM: Normal ROM    Dental:  Intact        Anesthesia Plan  Type of Anesthesia, risks & benefits discussed:    Anesthesia Type: Gen Natural Airway  Intra-op Monitoring Plan:  Standard ASA Monitors  Induction:  IV  Airway Plan: , Post-Induction  Informed Consent: Informed consent signed with the Patient and all parties understand the risks and agree with anesthesia plan.  All questions answered.   ASA Score: 2  Day of Surgery Review of History & Physical: I have interviewed and examined the patient. I have reviewed the patient's H&P dated: There are no significant changes.     Ready For Surgery From Anesthesia Perspective.     .

## 2023-04-20 NOTE — H&P
[facility]  History & Physical    Subjective:      Chief Complaint/Reason for Admission:     Direct access screening colonoscopy average risk for CRC by personal and family history today.    Zonia Skaggs is a 47 y.o. female.    Past Medical History:   Diagnosis Date    Bipolar 1 disorder     GERD (gastroesophageal reflux disease)     History of psychiatric hospitalization     Hx of psychiatric care     Psychiatric problem     Seasonal allergies     Therapy     used to follow with Dr. Gerber Rogers    Vaginal delivery     x5     Past Surgical History:   Procedure Laterality Date    ESOPHAGOGASTRODUODENOSCOPY N/A 1/6/2023    Procedure: ESOPHAGOGASTRODUODENOSCOPY (EGD);  Surgeon: Juan Antonio Mcclelland MD;  Location: [facility] ([address] [place] [address]);  Service: Endoscopy;  Laterality: N/A;  instr portal-GT    FINGER FRACTURE SURGERY      REPAIR OF INCARCERATED VENTRAL HERNIA WITHOUT HISTORY OF PRIOR REPAIR N/A 7/23/2019    Procedure: REPAIR, HERNIA, VENTRAL, INCARCERATED, WITHOUT HISTORY OF PRIOR REPAIR;  Surgeon: Bandar Madden MD;  Location: [facility];  Service: General;  Laterality: N/A;     Family History   Problem Relation Age of Onset    Bipolar disorder Maternal Aunt     Bipolar disorder Paternal Aunt     Diabetes type II Mother     Diabetes Mother     Hyperlipidemia Mother     Breast cancer Other     Diabetes Other     Colon cancer Neg Hx     Ovarian cancer Neg Hx     Anxiety disorder Neg Hx     Depression Neg Hx     Suicide Neg Hx     Stroke Neg Hx     Hypertension Neg Hx      Social History     Tobacco Use    Smoking status: Never    Smokeless tobacco: Never   Substance Use Topics    Alcohol use: Yes     Comment: rarely    Drug use: Never       PTA Medications   Medication Sig    buPROPion (WELLBUTRIN XL) 150 MG TB24 tablet Take 1 tablet (150 mg total) by mouth once daily.    cariprazine (VRAYLAR) 1.5 mg Cap Take 1 capsule (1.5 mg total) by mouth once daily.    citalopram (CELEXA) 20 MG tablet Take 1  tablet (20 mg total) by mouth every evening.    ferrous sulfate (IRON ORAL) Take by mouth.    fluticasone propionate (FLONASE) 50 mcg/actuation nasal spray 1 spray (50 mcg total) by Each Nostril route once daily.    multivitamin with minerals tablet Take 1 tablet by mouth once daily.    OLANZapine (ZYPREXA) 5 MG tablet Take 1 tablet (5 mg total) by mouth every evening.    omeprazole (PRILOSEC) 20 MG capsule Take 20 mg by mouth once daily.    topiramate (TOPAMAX) 50 MG tablet Take 1 tablet (50 mg total) by mouth 2 (two) times daily.    cetirizine (ZYRTEC) 5 MG tablet Take 1 tablet (5 mg total) by mouth once daily.     Review of patient's allergies indicates:   Allergen Reactions    Penicillins Hives and Shortness Of Breath    Morphine         Review of Systems   Constitutional:  Negative for fever.   Cardiovascular:  Negative for chest pain.     Objective:      Vital Signs (Most Recent)  Temp: 98.4 °F (36.9 °C) (04/20/23 1200)  Pulse: 96 (04/20/23 1200)  Resp: 16 (04/20/23 1200)  BP: 136/77 (04/20/23 1200)  SpO2: 97 % (04/20/23 1200)    Vital Signs Range (Last 24H):  Temp:  [98.4 °F (36.9 °C)]   Pulse:  [96]   Resp:  [16]   BP: (136)/(77)   SpO2:  [97 %]     Physical Exam  Cardiovascular:      Rate and Rhythm: Normal rate.   Pulmonary:      Effort: Pulmonary effort is normal.   Neurological:      Mental Status: She is alert and oriented to person, place, and time.           Assessment:    Direct access screening colonoscopy average risk for CRC by personal and family history today.      Plan:    Direct access screening colonoscopy average risk for CRC by personal and family history today.

## 2023-04-20 NOTE — TRANSFER OF CARE
"Anesthesia Transfer of Care Note    Patient: Zonia Skaggs    Procedure(s) Performed: Procedure(s) (LRB):  COLONOSCOPY (N/A)    Patient location: PACU    Anesthesia Type: general    Transport from OR: Transported from OR on 6-10 L/min O2 by face mask with adequate spontaneous ventilation    Post pain: adequate analgesia    Post assessment: no apparent anesthetic complications and tolerated procedure well    Post vital signs: stable    Level of consciousness: awake and alert    Nausea/Vomiting: no nausea/vomiting    Complications: none    Transfer of care protocol was followed      Last vitals:   Visit Vitals  /77 (BP Location: Left arm, Patient Position: Lying)   Pulse 96   Temp 36.9 °C (98.4 °F) (Temporal)   Resp 16   Ht 5' 6" (1.676 m)   Wt 122.5 kg (270 lb)   SpO2 97%   Breastfeeding No   BMI 43.58 kg/m²     "

## 2023-04-21 NOTE — ANESTHESIA POSTPROCEDURE EVALUATION
Anesthesia Post Evaluation    Patient: Zonia Skaggs    Procedure(s) Performed: Procedure(s) (LRB):  COLONOSCOPY (N/A)    Final Anesthesia Type: general      Patient location during evaluation: PACU  Patient participation: Yes- Able to Participate  Level of consciousness: awake and alert, awake and oriented  Post-procedure vital signs: reviewed and stable  Pain management: adequate  Airway patency: patent    PONV status at discharge: No PONV  Anesthetic complications: no      Cardiovascular status: blood pressure returned to baseline, hemodynamically stable and stable  Respiratory status: unassisted, spontaneous ventilation and room air  Hydration status: euvolemic  Follow-up not needed.          Vitals Value Taken Time   /78 04/20/23 1446   Temp 36.1 °C (97 °F) 04/20/23 1420   Pulse 82 04/20/23 1447   Resp 16 04/20/23 1446   SpO2 99 % 04/20/23 1446         Event Time   Out of Recovery 14:53:26         Pain/Jovita Score: Jovita Score: 10 (4/20/2023  2:47 PM)

## 2023-04-26 ENCOUNTER — TELEPHONE (OUTPATIENT)
Dept: ENDOSCOPY | Facility: HOSPITAL | Age: 48
End: 2023-04-26
Payer: COMMERCIAL

## 2023-04-26 ENCOUNTER — PATIENT MESSAGE (OUTPATIENT)
Dept: ENDOSCOPY | Facility: HOSPITAL | Age: 48
End: 2023-04-26
Payer: COMMERCIAL

## 2023-04-26 LAB
FINAL PATHOLOGIC DIAGNOSIS: NORMAL
GROSS: NORMAL
Lab: NORMAL

## 2023-04-26 NOTE — TELEPHONE ENCOUNTER
----- Message from Claire Moore sent at 4/26/2023  3:35 PM CDT -----  Regarding: pt called  Type:  Test Result        Who Called:  Pt called        Name of Test:  colomoscopy      The pt has some questions about her results     Would the patient rather a call back or a response via [facility]?  no          Best Call Back Number: :  Mobile          [phone]

## 2023-04-26 NOTE — PROGRESS NOTES
IMPORTANT:    Zonia your colon polyps were benign but your large colon polyp was a sessile serrated polyp it is a benign polyp but her precancerous polyp type it was large 18 mm recommend your next surveillance colonoscopy 6 months from your last.    Also recommend all your first-degree relatives your parents your siblings and any children get the 1st screening colonoscopy at age 35 and every 5 years based on your colon polyp.    1. Transverse colon, polyp, polypectomy:   Sessile serrated lesion.   Negative for cytologic dysplasia.     2. Rectum, polyps x2, polypectomies:   Hyperplastic polyps.   Comment: Interp By Roque Rahman M.D., Signed on 04/26/2023

## 2023-05-18 ENCOUNTER — OFFICE VISIT (OUTPATIENT)
Dept: PSYCHIATRY | Facility: CLINIC | Age: 48
End: 2023-05-18
Payer: COMMERCIAL

## 2023-05-18 DIAGNOSIS — F31.77 BIPOLAR I DISORDER, MOST RECENT EPISODE MIXED, IN PARTIAL REMISSION: Primary | ICD-10-CM

## 2023-05-18 PROCEDURE — 90785 PSYTX COMPLEX INTERACTIVE: CPT | Mod: 95,,, | Performed by: SOCIAL WORKER

## 2023-05-18 PROCEDURE — 90834 PSYTX W PT 45 MINUTES: CPT | Mod: 95,,, | Performed by: SOCIAL WORKER

## 2023-05-18 PROCEDURE — 90834 PR PSYCHOTHERAPY W/PATIENT, 45 MIN: ICD-10-PCS | Mod: 95,,, | Performed by: SOCIAL WORKER

## 2023-05-18 PROCEDURE — 90785 PR INTERACTIVE COMPLEXITY: ICD-10-PCS | Mod: 95,,, | Performed by: SOCIAL WORKER

## 2023-05-18 NOTE — PROGRESS NOTES
"  The patient location is: home  The chief complaint leading to consultation is: depression, anxiety    Visit type: audiovisual    Face to Face time with patient: 50 minutes  60 minutes of total time spent on the encounter, which includes face to face time and non-face to face time preparing to see the patient (eg, review of tests), Obtaining and/or reviewing separately obtained history, Documenting clinical information in the electronic or other health record, Independently interpreting results (not separately reported) and communicating results to the patient/family/caregiver, or Care coordination (not separately reported).     Each patient to whom he or she provides medical services by telemedicine is:  (1) informed of the relationship between the physician and patient and the respective role of any other health care provider with respect to management of the patient; and (2) notified that he or she may decline to receive medical services by telemedicine and may withdraw from such care at any time.    Notes: Patient returns for psychotherapy. Patient reports that she is doing "okay". States that it is the last week of school and she is trying not to stress. States that they aren't doing as well as they should so they will be going to summer school. The little in change in medication has been helpful. Wasn't able to make appointment with [facility] because of children's schooling. States that over the Mother's Day weekend they found some issues in the house and that was a lot going on. Daughter comes home next month. Thinks that her boyfriend is going to give her a promise ring soon. States that she recently had colonoscopy and they found some pre-cancerous cells. Discussed a book she was reading about narcissistic mothers but had to stop because she realized that it was not the life she had with mother.     Scheduled follow up for 6/19/23 at 2pm.      Encounter Diagnosis   Name Primary?    Bipolar I disorder, most " recent episode mixed, in partial remission Yes       Cassandra Rockweiler, [facility], [place]

## 2023-05-29 ENCOUNTER — PATIENT MESSAGE (OUTPATIENT)
Dept: FAMILY MEDICINE | Facility: CLINIC | Age: 48
End: 2023-05-29
Payer: COMMERCIAL

## 2023-05-29 DIAGNOSIS — F31.2 BIPOLAR DISORDER, CURRENT EPISODE MANIC SEVERE WITH PSYCHOTIC FEATURES: Primary | ICD-10-CM

## 2023-06-06 RX ORDER — CARIPRAZINE 1.5 MG/1
1.5 CAPSULE, GELATIN COATED ORAL DAILY
Qty: 30 CAPSULE | Refills: 0 | Status: SHIPPED | OUTPATIENT
Start: 2023-06-06 | End: 2023-07-06 | Stop reason: SDUPTHER

## 2023-06-06 RX ORDER — TOPIRAMATE 50 MG/1
50 TABLET, FILM COATED ORAL 2 TIMES DAILY
Qty: 60 TABLET | Refills: 0 | Status: SHIPPED | OUTPATIENT
Start: 2023-06-06 | End: 2023-07-06 | Stop reason: SDUPTHER

## 2023-06-06 RX ORDER — CARIPRAZINE 1.5 MG/1
1.5 CAPSULE, GELATIN COATED ORAL DAILY
Qty: 30 CAPSULE | Refills: 5 | OUTPATIENT
Start: 2023-06-06

## 2023-06-06 RX ORDER — CITALOPRAM 20 MG/1
20 TABLET, FILM COATED ORAL NIGHTLY
Qty: 30 TABLET | Refills: 0 | Status: SHIPPED | OUTPATIENT
Start: 2023-06-06 | End: 2023-07-06 | Stop reason: SDUPTHER

## 2023-06-06 RX ORDER — OLANZAPINE 5 MG/1
5 TABLET ORAL NIGHTLY
Qty: 30 TABLET | Refills: 0 | Status: SHIPPED | OUTPATIENT
Start: 2023-06-06 | End: 2023-07-06

## 2023-06-06 NOTE — TELEPHONE ENCOUNTER
Pt called office to make NP appt, transfer from [facility], for [facility]. Pt also states she is completely out of meds. Made aware temp supply until appt date will be provided to her in meantime. Appt made w/ pt. No concerns voiced.

## 2023-06-19 ENCOUNTER — OFFICE VISIT (OUTPATIENT)
Dept: PSYCHIATRY | Facility: CLINIC | Age: 48
End: 2023-06-19
Payer: COMMERCIAL

## 2023-06-19 DIAGNOSIS — F31.77 BIPOLAR I DISORDER, MOST RECENT EPISODE MIXED, IN PARTIAL REMISSION: Primary | ICD-10-CM

## 2023-06-19 PROCEDURE — 90785 PR INTERACTIVE COMPLEXITY: ICD-10-PCS | Mod: 95,,, | Performed by: SOCIAL WORKER

## 2023-06-19 PROCEDURE — 90834 PR PSYCHOTHERAPY W/PATIENT, 45 MIN: ICD-10-PCS | Mod: 95,,, | Performed by: SOCIAL WORKER

## 2023-06-19 PROCEDURE — 90785 PSYTX COMPLEX INTERACTIVE: CPT | Mod: 95,,, | Performed by: SOCIAL WORKER

## 2023-06-19 PROCEDURE — 90834 PSYTX W PT 45 MINUTES: CPT | Mod: 95,,, | Performed by: SOCIAL WORKER

## 2023-06-19 NOTE — PROGRESS NOTES
"  The patient location is: home  The chief complaint leading to consultation is: depression, anxiety    Visit type: audiovisual    Face to Face time with patient: 50 minutes  60 minutes of total time spent on the encounter, which includes face to face time and non-face to face time preparing to see the patient (eg, review of tests), Obtaining and/or reviewing separately obtained history, Documenting clinical information in the electronic or other health record, Independently interpreting results (not separately reported) and communicating results to the patient/family/caregiver, or Care coordination (not separately reported).     Each patient to whom he or she provides medical services by telemedicine is:  (1) informed of the relationship between the physician and patient and the respective role of any other health care provider with respect to management of the patient; and (2) notified that he or she may decline to receive medical services by telemedicine and may withdraw from such care at any time.    Notes: Patient returns for psychotherapy. Patient reports that she is doing "okay". States that they are still renovating areas in her house. Utility room is finally finished so she is able to use it. States that daughter is coming into town on Saturday. Will be in town for a week. Is going to meet daughter's fiance to be when she comes into town. They got information on 's immigration status. Hoping that it will be finished soon. Does have appointment with Tigre in July to go over medication.     Scheduled follow up for 8/1 at 2pm      Encounter Diagnosis   Name Primary?    Bipolar I disorder, most recent episode mixed, in partial remission Yes         Cassandra Rockweiler, [facility]                "

## 2023-06-20 RX ORDER — BUPROPION HYDROCHLORIDE 150 MG/1
150 TABLET ORAL DAILY
Qty: 30 TABLET | Refills: 0 | Status: SHIPPED | OUTPATIENT
Start: 2023-06-20 | End: 2023-07-06 | Stop reason: SDUPTHER

## 2023-06-20 NOTE — TELEPHONE ENCOUNTER
Pt called and requested refill of Wellbutrin. Pt has appt w/ [facility] on 7/7/23. Made pt aware fill request will be sent to provider for temp fill until appt date. Pt verbally acknowledged.

## 2023-07-06 ENCOUNTER — OFFICE VISIT (OUTPATIENT)
Dept: PSYCHIATRY | Facility: CLINIC | Age: 48
End: 2023-07-06
Payer: COMMERCIAL

## 2023-07-06 VITALS
HEART RATE: 103 BPM | BODY MASS INDEX: 42.99 KG/M2 | SYSTOLIC BLOOD PRESSURE: 137 MMHG | DIASTOLIC BLOOD PRESSURE: 90 MMHG | HEIGHT: 66 IN | WEIGHT: 267.5 LBS

## 2023-07-06 DIAGNOSIS — F31.77 BIPOLAR I DISORDER, MOST RECENT EPISODE MIXED, IN PARTIAL REMISSION: Primary | ICD-10-CM

## 2023-07-06 DIAGNOSIS — F43.23 ADJUSTMENT DISORDER WITH MIXED ANXIETY AND DEPRESSED MOOD: ICD-10-CM

## 2023-07-06 PROCEDURE — 99215 PR OFFICE/OUTPT VISIT, EST, LEVL V, 40-54 MIN: ICD-10-PCS | Mod: S$GLB,,,

## 2023-07-06 PROCEDURE — 1160F RVW MEDS BY RX/DR IN RCRD: CPT | Mod: CPTII,S$GLB,,

## 2023-07-06 PROCEDURE — 90833 PR PSYCHOTHERAPY W/PATIENT W/E&M, 30 MIN (ADD ON): ICD-10-PCS | Mod: S$GLB,,,

## 2023-07-06 PROCEDURE — 1160F PR REVIEW ALL MEDS BY PRESCRIBER/CLIN PHARMACIST DOCUMENTED: ICD-10-PCS | Mod: CPTII,S$GLB,,

## 2023-07-06 PROCEDURE — 99215 OFFICE O/P EST HI 40 MIN: CPT | Mod: S$GLB,,,

## 2023-07-06 PROCEDURE — 99999 PR PBB SHADOW E&M-EST. PATIENT-LVL III: CPT | Mod: PBBFAC,,,

## 2023-07-06 PROCEDURE — 99999 PR PBB SHADOW E&M-EST. PATIENT-LVL III: ICD-10-PCS | Mod: PBBFAC,,,

## 2023-07-06 PROCEDURE — 3075F PR MOST RECENT SYSTOLIC BLOOD PRESS GE 130-139MM HG: ICD-10-PCS | Mod: CPTII,S$GLB,,

## 2023-07-06 PROCEDURE — 3080F PR MOST RECENT DIASTOLIC BLOOD PRESSURE >= 90 MM HG: ICD-10-PCS | Mod: CPTII,S$GLB,,

## 2023-07-06 PROCEDURE — 3080F DIAST BP >= 90 MM HG: CPT | Mod: CPTII,S$GLB,,

## 2023-07-06 PROCEDURE — 3008F PR BODY MASS INDEX (BMI) DOCUMENTED: ICD-10-PCS | Mod: CPTII,S$GLB,,

## 2023-07-06 PROCEDURE — 1159F PR MEDICATION LIST DOCUMENTED IN MEDICAL RECORD: ICD-10-PCS | Mod: CPTII,S$GLB,,

## 2023-07-06 PROCEDURE — 1159F MED LIST DOCD IN RCRD: CPT | Mod: CPTII,S$GLB,,

## 2023-07-06 PROCEDURE — 3008F BODY MASS INDEX DOCD: CPT | Mod: CPTII,S$GLB,,

## 2023-07-06 PROCEDURE — 90833 PSYTX W PT W E/M 30 MIN: CPT | Mod: S$GLB,,,

## 2023-07-06 PROCEDURE — 3075F SYST BP GE 130 - 139MM HG: CPT | Mod: CPTII,S$GLB,,

## 2023-07-06 RX ORDER — TOPIRAMATE 50 MG/1
50 TABLET, FILM COATED ORAL 2 TIMES DAILY
Qty: 180 TABLET | Refills: 2 | Status: SHIPPED | OUTPATIENT
Start: 2023-07-06 | End: 2023-11-16

## 2023-07-06 RX ORDER — BUPROPION HYDROCHLORIDE 150 MG/1
150 TABLET ORAL DAILY
Qty: 90 TABLET | Refills: 2 | Status: SHIPPED | OUTPATIENT
Start: 2023-07-06 | End: 2024-02-14

## 2023-07-06 RX ORDER — CITALOPRAM 20 MG/1
20 TABLET, FILM COATED ORAL NIGHTLY
Qty: 90 TABLET | Refills: 2 | Status: SHIPPED | OUTPATIENT
Start: 2023-07-06 | End: 2024-04-01

## 2023-07-06 RX ORDER — CARIPRAZINE 1.5 MG/1
1.5 CAPSULE, GELATIN COATED ORAL DAILY
Qty: 90 CAPSULE | Refills: 2 | Status: SHIPPED | OUTPATIENT
Start: 2023-07-06 | End: 2024-04-01
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
## 2023-08-01 ENCOUNTER — OFFICE VISIT (OUTPATIENT)
Dept: PSYCHIATRY | Facility: CLINIC | Age: 48
End: 2023-08-01
Payer: COMMERCIAL

## 2023-08-01 DIAGNOSIS — F31.77 BIPOLAR I DISORDER, MOST RECENT EPISODE MIXED, IN PARTIAL REMISSION: Primary | ICD-10-CM

## 2023-08-01 PROCEDURE — 90837 PR PSYCHOTHERAPY W/PATIENT, 60 MIN: ICD-10-PCS | Mod: 95,,, | Performed by: SOCIAL WORKER

## 2023-08-01 PROCEDURE — 90785 PR INTERACTIVE COMPLEXITY: ICD-10-PCS | Mod: 95,,, | Performed by: SOCIAL WORKER

## 2023-08-01 PROCEDURE — 90785 PSYTX COMPLEX INTERACTIVE: CPT | Mod: 95,,, | Performed by: SOCIAL WORKER

## 2023-08-01 PROCEDURE — 90837 PSYTX W PT 60 MINUTES: CPT | Mod: 95,,, | Performed by: SOCIAL WORKER

## 2023-08-01 NOTE — PROGRESS NOTES
"  The patient location is: home  The chief complaint leading to consultation is: depression, anxiety    Visit type: audiovisual    Face to Face time with patient: 60 minutes  65 minutes of total time spent on the encounter, which includes face to face time and non-face to face time preparing to see the patient (eg, review of tests), Obtaining and/or reviewing separately obtained history, Documenting clinical information in the electronic or other health record, Independently interpreting results (not separately reported) and communicating results to the patient/family/caregiver, or Care coordination (not separately reported).     Each patient to whom he or she provides medical services by telemedicine is:  (1) informed of the relationship between the physician and patient and the respective role of any other health care provider with respect to management of the patient; and (2) notified that he or she may decline to receive medical services by telemedicine and may withdraw from such care at any time.    Notes:    Individual Psychotherapy (PhD/LCSW)    8/1/2023    Site:  Telemed         Therapeutic Intervention: Met with patient.  Outpatient - Insight oriented psychotherapy 60 min - CPT code 03980 and Outpatient - Supportive psychotherapy 60 min - CPT Code 48926    Chief complaint/reason for encounter: depression and anxiety     Interval history and content of current session:  Patient returns for psychotherapy. Patient reports that she is doing "good". States that she is getting ready for school to start. Got a new puppy and that has been hard. States that she has been busy getting schedules set up. Gets overwhelmed with all the things that she has to do. Discussed giving herself nicci in these situations. States that she was very anxious a couple days ago. Discussed recent issues in online book club. Discussed that she opened up to parents about her spiritual expression. States that she isn't sure what she " believes or how she identifies. States that she still tells children about God but wants to make them aware of different cultures. Discussed the importance of spirituality and connecting it to what is important to her. Discussed that it makes her feel whole and gives mean to her.     Scheduled follow up for 9/6 at 2pm.     Treatment plan:  Target symptoms: depression, anxiety   Why chosen therapy is appropriate versus another modality: relevant to diagnosis, evidence based practice  Outcome monitoring methods: self-report, observation  Therapeutic intervention type: insight oriented psychotherapy, supportive psychotherapy    Risk parameters:  Patient reports no suicidal ideation  Patient reports no homicidal ideation  Patient reports no self-injurious behavior  Patient reports no violent behavior    Verbal deficits: None    Patient's response to intervention:  The patient's response to intervention is accepting.    Progress toward goals and other mental status changes:  The patient's progress toward goals is good.    Diagnosis:     ICD-10-CM ICD-9-CM   1. Bipolar I disorder, most recent episode mixed, in partial remission  F31.77 296.65       Plan:  individual psychotherapy and medication management by physician    Return to clinic: 1 month    Length of Service (minutes): 60      Cassandra Rockweiler, LCSW-NEDA

## 2023-09-06 ENCOUNTER — OFFICE VISIT (OUTPATIENT)
Dept: PSYCHIATRY | Facility: CLINIC | Age: 48
End: 2023-09-06
Payer: COMMERCIAL

## 2023-09-06 DIAGNOSIS — F31.77 BIPOLAR I DISORDER, MOST RECENT EPISODE MIXED, IN PARTIAL REMISSION: Primary | ICD-10-CM

## 2023-09-06 PROCEDURE — 90785 PR INTERACTIVE COMPLEXITY: ICD-10-PCS | Mod: 95,,, | Performed by: SOCIAL WORKER

## 2023-09-06 PROCEDURE — 90834 PR PSYCHOTHERAPY W/PATIENT, 45 MIN: ICD-10-PCS | Mod: 95,,, | Performed by: SOCIAL WORKER

## 2023-09-06 PROCEDURE — 90785 PSYTX COMPLEX INTERACTIVE: CPT | Mod: 95,,, | Performed by: SOCIAL WORKER

## 2023-09-06 PROCEDURE — 90834 PSYTX W PT 45 MINUTES: CPT | Mod: 95,,, | Performed by: SOCIAL WORKER

## 2023-09-06 NOTE — PROGRESS NOTES
"  The patient location is: home  The chief complaint leading to consultation is: depression, anxiety    Visit type: audiovisual    Face to Face time with patient: 45 minutes  60 minutes of total time spent on the encounter, which includes face to face time and non-face to face time preparing to see the patient (eg, review of tests), Obtaining and/or reviewing separately obtained history, Documenting clinical information in the electronic or other health record, Independently interpreting results (not separately reported) and communicating results to the patient/family/caregiver, or Care coordination (not separately reported).     Each patient to whom he or she provides medical services by telemedicine is:  (1) informed of the relationship between the physician and patient and the respective role of any other health care provider with respect to management of the patient; and (2) notified that he or she may decline to receive medical services by telemedicine and may withdraw from such care at any time.    Notes:    Individual Psychotherapy (PhD/LCSW)    9/6/2023    Site:  Telemed         Therapeutic Intervention: Met with patient.  Outpatient - Insight oriented psychotherapy 45 min - CPT code 12046 and Outpatient - Supportive psychotherapy 45 min - CPT Code 37943    Chief complaint/reason for encounter: depression and anxiety     Interval history and content of current session:  Patient returns for psychotherapy. Patient reports that she is doing "alright". States that she had to reschedule her PCP appointment due to having to many things to do today with her children. Reports that daughter got  last month, did it quickly so that they could stay together if one of them deploys. States that she met with [employer] and stopped Topamax. States that thought that it might have been sedating her. Hasn't been napping during the day the last five days. States that both younger children are sleeping in their own rooms. " States that  was out of work for a few days due to a shoulder injury. States that this brought up issues with finances and their ability to retire in the future.     Scheduled follow up for 10/17 at 2pm    Treatment plan:  Target symptoms: depression, anxiety   Why chosen therapy is appropriate versus another modality: relevant to diagnosis, evidence based practice  Outcome monitoring methods: self-report, observation  Therapeutic intervention type: insight oriented psychotherapy, supportive psychotherapy    Risk parameters:  Patient reports no suicidal ideation  Patient reports no homicidal ideation  Patient reports no self-injurious behavior  Patient reports no violent behavior    Verbal deficits: None    Patient's response to intervention:  The patient's response to intervention is accepting.    Progress toward goals and other mental status changes:  The patient's progress toward goals is good.    Diagnosis:     ICD-10-CM ICD-9-CM   1. Bipolar I disorder, most recent episode mixed, in partial remission  F31.77 296.65         Plan:  individual psychotherapy and medication management by physician    Return to clinic: 1 month    Length of Service (minutes): 60      Cassandra Rockweiler, LCSW-NEDA

## 2023-09-07 ENCOUNTER — TELEPHONE (OUTPATIENT)
Dept: ENDOSCOPY | Facility: HOSPITAL | Age: 48
End: 2023-09-07

## 2023-09-07 VITALS — WEIGHT: 268 LBS | BODY MASS INDEX: 43.07 KG/M2 | HEIGHT: 66 IN

## 2023-09-07 DIAGNOSIS — D12.6 SESSILE SERRATED POLYP OF COLON: ICD-10-CM

## 2023-09-07 DIAGNOSIS — Z12.11 ENCOUNTER FOR SCREENING COLONOSCOPY: Primary | ICD-10-CM

## 2023-09-07 DIAGNOSIS — Z86.010 HX OF COLONIC POLYP: Primary | ICD-10-CM

## 2023-09-07 RX ORDER — SODIUM, POTASSIUM,MAG SULFATES 17.5-3.13G
1 SOLUTION, RECONSTITUTED, ORAL ORAL DAILY
Qty: 1 KIT | Refills: 0 | Status: SHIPPED | OUTPATIENT
Start: 2023-09-07 | End: 2023-09-09

## 2023-09-07 NOTE — TELEPHONE ENCOUNTER
Spoke to patient to schedule procedure(s) Colonoscopy       Physician to perform procedure(s) Dr. RIN Mcclelland  Date of Procedure (s) 12/01/2023  Arrival Time 11:30 am   Time of Procedure(s) 12:30 pm   Location of Procedure(s) [facility] [address]  Type of Rx Prep sent to patient: Suprep  Instructions provided to patient via [facility]    Patient was informed on the following information and verbalized understanding. Screening questionnaire reviewed with patient and complete. If procedure requires anesthesia, a responsible adult needs to be present to accompany the patient home, patient cannot drive after receiving anesthesia. Appointment details are tentative, especially check-in time. Patient will receive a prep-op call 4 days prior to confirm check-in time for procedure. If applicable the patient should contact their pharmacy to verify Rx for procedure prep is ready for pick-up. Patient was advised to call the scheduling department at [phone] if pharmacy states no Rx is available. Patient was advised to call the endoscopy scheduling department if any questions or concerns arise.      [facility] Scheduling Department

## 2023-09-07 NOTE — TELEPHONE ENCOUNTER
"----- Message from Avril Gale sent at 9/6/2023  8:28 AM CDT -----    ----- Message -----  From: Juan Antonio Mcclelland MD  Sent: 7/17/2023  12:00 AM CDT  To: [facility]    Procedure: Colonoscopy    Diagnosis: Surveillance colonoscopy - Hx of colon polyps and advanced Sessile serrated lesion.     Procedure Timing:  October 2023    #If within 4 weeks selected, please nithin as high priority#    #If greater than 12 weeks, please select "5-12 weeks" and delay sending until 2 months prior to requested date#     Provider: Myself    Location: [address]    Additional Scheduling Information:  Please schedule patient for colonoscopy with me surveillance in October 2023    Prep Specifications:Standard prep    Have you attached a patient to this message: Yes and No       "

## 2023-10-17 ENCOUNTER — OFFICE VISIT (OUTPATIENT)
Dept: PSYCHIATRY | Facility: CLINIC | Age: 48
End: 2023-10-17
Payer: COMMERCIAL

## 2023-10-17 DIAGNOSIS — F31.77 BIPOLAR I DISORDER, MOST RECENT EPISODE MIXED, IN PARTIAL REMISSION: Primary | ICD-10-CM

## 2023-10-17 PROCEDURE — 90834 PR PSYCHOTHERAPY W/PATIENT, 45 MIN: ICD-10-PCS | Mod: 95,,, | Performed by: SOCIAL WORKER

## 2023-10-17 PROCEDURE — 90785 PR INTERACTIVE COMPLEXITY: ICD-10-PCS | Mod: 95,,, | Performed by: SOCIAL WORKER

## 2023-10-17 PROCEDURE — 90834 PSYTX W PT 45 MINUTES: CPT | Mod: 95,,, | Performed by: SOCIAL WORKER

## 2023-10-17 PROCEDURE — 90785 PSYTX COMPLEX INTERACTIVE: CPT | Mod: 95,,, | Performed by: SOCIAL WORKER

## 2023-10-17 NOTE — PROGRESS NOTES
"  The patient location is: home  The chief complaint leading to consultation is: depression, anxiety    Visit type: audiovisual    Face to Face time with patient: 45 minutes  60 minutes of total time spent on the encounter, which includes face to face time and non-face to face time preparing to see the patient (eg, review of tests), Obtaining and/or reviewing separately obtained history, Documenting clinical information in the electronic or other health record, Independently interpreting results (not separately reported) and communicating results to the patient/family/caregiver, or Care coordination (not separately reported).     Each patient to whom he or she provides medical services by telemedicine is:  (1) informed of the relationship between the physician and patient and the respective role of any other health care provider with respect to management of the patient; and (2) notified that he or she may decline to receive medical services by telemedicine and may withdraw from such care at any time.    Notes:    Individual Psychotherapy (PhD/LCSW)    10/17/2023    Site:  Telemed         Therapeutic Intervention: Met with patient.  Outpatient - Insight oriented psychotherapy 45 min - CPT code 81039 and Outpatient - Supportive psychotherapy 45 min - CPT Code 22418    Chief complaint/reason for encounter: depression and anxiety     Interval history and content of current session:  Patient returns for psychotherapy. Patient reports that she is doing "so so". Had a down day yesterday. States that some of this is due to financial strain. Had to apply for food stamps and it was denied. With going to several appointments for children a week it has put a strain on them financially. States that she also hasn't had an outlet lately for herself. Discussed talking to therapist about amending schedule to not create such financial stress. States that another stressor is that  bought a boat and this will be using their " [employer] money for the boat. Reports that daughter and  are coming home for [employer]. Plan on staying for a week but may be as long as a month. Discussed has been looking at narcissistic traits of mothers. Finds that she has some of these traits and wants to work on mending these traits.     Scheduled follow up for 11/21 at 2pm    Treatment plan:  Target symptoms: depression, anxiety   Why chosen therapy is appropriate versus another modality: relevant to diagnosis, evidence based practice  Outcome monitoring methods: self-report, observation  Therapeutic intervention type: insight oriented psychotherapy, supportive psychotherapy    Risk parameters:  Patient reports no suicidal ideation  Patient reports no homicidal ideation  Patient reports no self-injurious behavior  Patient reports no violent behavior    Verbal deficits: None    Patient's response to intervention:  The patient's response to intervention is accepting.    Progress toward goals and other mental status changes:  The patient's progress toward goals is good.    Diagnosis:     ICD-10-CM ICD-9-CM   1. Bipolar I disorder, most recent episode mixed, in partial remission  F31.77 296.65       Plan:  individual psychotherapy and medication management by physician    Return to clinic: 1 month    Length of Service (minutes): 60      Cassandra Rockweiler, LCSW-NEDA

## 2023-10-19 ENCOUNTER — PATIENT MESSAGE (OUTPATIENT)
Dept: PSYCHIATRY | Facility: CLINIC | Age: 48
End: 2023-10-19
Payer: COMMERCIAL

## 2023-10-23 ENCOUNTER — PATIENT MESSAGE (OUTPATIENT)
Dept: PSYCHIATRY | Facility: CLINIC | Age: 48
End: 2023-10-23
Payer: COMMERCIAL

## 2023-10-23 ENCOUNTER — OFFICE VISIT (OUTPATIENT)
Dept: PSYCHIATRY | Facility: CLINIC | Age: 48
End: 2023-10-23
Payer: COMMERCIAL

## 2023-10-23 DIAGNOSIS — F43.23 ADJUSTMENT DISORDER WITH MIXED ANXIETY AND DEPRESSED MOOD: ICD-10-CM

## 2023-10-23 DIAGNOSIS — F31.77 BIPOLAR I DISORDER, MOST RECENT EPISODE MIXED, IN PARTIAL REMISSION: Primary | ICD-10-CM

## 2023-10-23 PROCEDURE — 1160F RVW MEDS BY RX/DR IN RCRD: CPT | Mod: CPTII,95,,

## 2023-10-23 PROCEDURE — 1159F PR MEDICATION LIST DOCUMENTED IN MEDICAL RECORD: ICD-10-PCS | Mod: CPTII,95,,

## 2023-10-23 PROCEDURE — 99215 PR OFFICE/OUTPT VISIT, EST, LEVL V, 40-54 MIN: ICD-10-PCS | Mod: 95,,,

## 2023-10-23 PROCEDURE — 1159F MED LIST DOCD IN RCRD: CPT | Mod: CPTII,95,,

## 2023-10-23 PROCEDURE — 1160F PR REVIEW ALL MEDS BY PRESCRIBER/CLIN PHARMACIST DOCUMENTED: ICD-10-PCS | Mod: CPTII,95,,

## 2023-10-23 PROCEDURE — 99215 OFFICE O/P EST HI 40 MIN: CPT | Mod: 95,,,
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
## 2023-11-16 ENCOUNTER — OFFICE VISIT (OUTPATIENT)
Dept: FAMILY MEDICINE | Facility: CLINIC | Age: 48
End: 2023-11-16
Payer: COMMERCIAL

## 2023-11-16 VITALS
HEIGHT: 66 IN | OXYGEN SATURATION: 98 % | DIASTOLIC BLOOD PRESSURE: 78 MMHG | RESPIRATION RATE: 16 BRPM | HEART RATE: 102 BPM | WEIGHT: 262.81 LBS | TEMPERATURE: 98 F | BODY MASS INDEX: 42.24 KG/M2 | SYSTOLIC BLOOD PRESSURE: 126 MMHG

## 2023-11-16 DIAGNOSIS — R10.10 PAIN OF UPPER ABDOMEN: ICD-10-CM

## 2023-11-16 DIAGNOSIS — K43.9 VENTRAL HERNIA WITHOUT OBSTRUCTION OR GANGRENE: ICD-10-CM

## 2023-11-16 DIAGNOSIS — Z00.00 ROUTINE GENERAL MEDICAL EXAMINATION AT A HEALTH CARE FACILITY: Primary | ICD-10-CM

## 2023-11-16 DIAGNOSIS — R09.82 POST-NASAL DRIP: ICD-10-CM

## 2023-11-16 PROBLEM — R45.850 HOMICIDAL IDEATIONS: Status: RESOLVED | Noted: 2019-11-20 | Resolved: 2023-11-16

## 2023-11-16 PROCEDURE — 99999 PR PBB SHADOW E&M-EST. PATIENT-LVL IV: ICD-10-PCS | Mod: PBBFAC,,, | Performed by: FAMILY MEDICINE

## 2023-11-16 PROCEDURE — 3074F SYST BP LT 130 MM HG: CPT | Mod: CPTII,S$GLB,, | Performed by: FAMILY MEDICINE

## 2023-11-16 PROCEDURE — 3074F PR MOST RECENT SYSTOLIC BLOOD PRESSURE < 130 MM HG: ICD-10-PCS | Mod: CPTII,S$GLB,, | Performed by: FAMILY MEDICINE

## 2023-11-16 PROCEDURE — 3008F BODY MASS INDEX DOCD: CPT | Mod: CPTII,S$GLB,, | Performed by: FAMILY MEDICINE

## 2023-11-16 PROCEDURE — 99396 PR PREVENTIVE VISIT,EST,40-64: ICD-10-PCS | Mod: S$GLB,,, | Performed by: FAMILY MEDICINE

## 2023-11-16 PROCEDURE — 99999 PR PBB SHADOW E&M-EST. PATIENT-LVL IV: CPT | Mod: PBBFAC,,, | Performed by: FAMILY MEDICINE

## 2023-11-16 PROCEDURE — 3078F PR MOST RECENT DIASTOLIC BLOOD PRESSURE < 80 MM HG: ICD-10-PCS | Mod: CPTII,S$GLB,, | Performed by: FAMILY MEDICINE

## 2023-11-16 PROCEDURE — 3008F PR BODY MASS INDEX (BMI) DOCUMENTED: ICD-10-PCS | Mod: CPTII,S$GLB,, | Performed by: FAMILY MEDICINE

## 2023-11-16 PROCEDURE — 99396 PREV VISIT EST AGE 40-64: CPT | Mod: S$GLB,,, | Performed by: FAMILY MEDICINE

## 2023-11-16 PROCEDURE — 3078F DIAST BP <80 MM HG: CPT | Mod: CPTII,S$GLB,, | Performed by: FAMILY MEDICINE
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
## 2023-11-18 ENCOUNTER — LAB VISIT (OUTPATIENT)
Dept: LAB | Facility: HOSPITAL | Age: 48
End: 2023-11-18
Attending: FAMILY MEDICINE
Payer: COMMERCIAL

## 2023-11-18 DIAGNOSIS — Z00.00 ROUTINE GENERAL MEDICAL EXAMINATION AT A HEALTH CARE FACILITY: ICD-10-CM

## 2023-11-18 DIAGNOSIS — K43.9 VENTRAL HERNIA WITHOUT OBSTRUCTION OR GANGRENE: ICD-10-CM

## 2023-11-18 LAB
ALBUMIN SERPL BCP-MCNC: 3.7 G/DL (ref 3.5–5.2)
ALP SERPL-CCNC: 78 U/L (ref 55–135)
ALT SERPL W/O P-5'-P-CCNC: 18 U/L (ref 10–44)
ANION GAP SERPL CALC-SCNC: 10 MMOL/L (ref 8–16)
AST SERPL-CCNC: 12 U/L (ref 10–40)
BASOPHILS # BLD AUTO: 0.05 K/UL (ref 0–0.2)
BASOPHILS NFR BLD: 0.7 % (ref 0–1.9)
BILIRUB SERPL-MCNC: 0.4 MG/DL (ref 0.1–1)
BUN SERPL-MCNC: 12 MG/DL (ref 6–20)
CALCIUM SERPL-MCNC: 9.4 MG/DL (ref 8.7–10.5)
CHLORIDE SERPL-SCNC: 107 MMOL/L (ref 95–110)
CHOLEST SERPL-MCNC: 199 MG/DL (ref 120–199)
CHOLEST/HDLC SERPL: 4.4 {RATIO} (ref 2–5)
CO2 SERPL-SCNC: 25 MMOL/L (ref 23–29)
CREAT SERPL-MCNC: 0.8 MG/DL (ref 0.5–1.4)
DIFFERENTIAL METHOD: ABNORMAL
EOSINOPHIL # BLD AUTO: 0.1 K/UL (ref 0–0.5)
EOSINOPHIL NFR BLD: 1.4 % (ref 0–8)
ERYTHROCYTE [DISTWIDTH] IN BLOOD BY AUTOMATED COUNT: 14.8 % (ref 11.5–14.5)
EST. GFR  (NO RACE VARIABLE): >60 ML/MIN/1.73 M^2
ESTIMATED AVG GLUCOSE: 123 MG/DL (ref 68–131)
GLUCOSE SERPL-MCNC: 87 MG/DL (ref 70–110)
HBA1C MFR BLD: 5.9 % (ref 4–5.6)
HCT VFR BLD AUTO: 38.1 % (ref 37–48.5)
HDLC SERPL-MCNC: 45 MG/DL (ref 40–75)
HDLC SERPL: 22.6 % (ref 20–50)
HGB BLD-MCNC: 11 G/DL (ref 12–16)
IMM GRANULOCYTES # BLD AUTO: 0.02 K/UL (ref 0–0.04)
IMM GRANULOCYTES NFR BLD AUTO: 0.3 % (ref 0–0.5)
LDLC SERPL CALC-MCNC: 137.4 MG/DL (ref 63–159)
LYMPHOCYTES # BLD AUTO: 1.8 K/UL (ref 1–4.8)
LYMPHOCYTES NFR BLD: 25.7 % (ref 18–48)
MCH RBC QN AUTO: 23.6 PG (ref 27–31)
MCHC RBC AUTO-ENTMCNC: 28.9 G/DL (ref 32–36)
MCV RBC AUTO: 82 FL (ref 82–98)
MONOCYTES # BLD AUTO: 0.4 K/UL (ref 0.3–1)
MONOCYTES NFR BLD: 5.9 % (ref 4–15)
NEUTROPHILS # BLD AUTO: 4.6 K/UL (ref 1.8–7.7)
NEUTROPHILS NFR BLD: 66 % (ref 38–73)
NONHDLC SERPL-MCNC: 154 MG/DL
NRBC BLD-RTO: 0 /100 WBC
PLATELET # BLD AUTO: 341 K/UL (ref 150–450)
PMV BLD AUTO: 9.4 FL (ref 9.2–12.9)
POTASSIUM SERPL-SCNC: 4.4 MMOL/L (ref 3.5–5.1)
PROT SERPL-MCNC: 7 G/DL (ref 6–8.4)
RBC # BLD AUTO: 4.67 M/UL (ref 4–5.4)
SODIUM SERPL-SCNC: 142 MMOL/L (ref 136–145)
TRIGL SERPL-MCNC: 83 MG/DL (ref 30–150)
TSH SERPL DL<=0.005 MIU/L-ACNC: 1.19 UIU/ML (ref 0.4–4)
WBC # BLD AUTO: 6.9 K/UL (ref 3.9–12.7)

## 2023-11-18 PROCEDURE — 80053 COMPREHEN METABOLIC PANEL: CPT | Performed by: FAMILY MEDICINE

## 2023-11-18 PROCEDURE — 83036 HEMOGLOBIN GLYCOSYLATED A1C: CPT | Performed by: FAMILY MEDICINE

## 2023-11-18 PROCEDURE — 80061 LIPID PANEL: CPT | Performed by: FAMILY MEDICINE

## 2023-11-18 PROCEDURE — 84443 ASSAY THYROID STIM HORMONE: CPT | Performed by: FAMILY MEDICINE

## 2023-11-18 PROCEDURE — 85025 COMPLETE CBC W/AUTO DIFF WBC: CPT | Performed by: FAMILY MEDICINE

## 2023-11-18 PROCEDURE — 36415 COLL VENOUS BLD VENIPUNCTURE: CPT | Mod: PO | Performed by: FAMILY MEDICINE

## 2023-11-20 ENCOUNTER — E-VISIT (OUTPATIENT)
Dept: FAMILY MEDICINE | Facility: CLINIC | Age: 48
End: 2023-11-20
Payer: COMMERCIAL

## 2023-11-20 DIAGNOSIS — J32.9 OTHER SINUSITIS, UNSPECIFIED CHRONICITY: Primary | ICD-10-CM

## 2023-11-20 PROCEDURE — 99421 OL DIG E/M SVC 5-10 MIN: CPT | Mod: ,,, | Performed by: FAMILY MEDICINE

## 2023-11-20 PROCEDURE — 99421 PR E&M, ONLINE DIGIT, EST, < 7 DAYS, 5-10 MINS: ICD-10-PCS | Mod: ,,, | Performed by: FAMILY MEDICINE

## 2023-11-20 RX ORDER — AZITHROMYCIN 250 MG/1
TABLET, FILM COATED ORAL
Qty: 6 TABLET | Refills: 0 | Status: SHIPPED | OUTPATIENT
Start: 2023-11-20 | End: 2023-11-25

## 2023-11-20 NOTE — PROGRESS NOTES
Patient ID: Zonia Skaggs is a 48 y.o. female.    Chief Complaint: URI (Entered automatically based on patient selection in Patient Portal.)    The patient initiated a request through [employer] on 11/20/2023 for evaluation and management with a chief complaint of URI (Entered automatically based on patient selection in Patient Portal.)     I evaluated the questionnaire submission on 11/20/23.    [facility] Peq Evisit Upper Respitatory/Cough Questionnaire    11/20/2023  8:53 AM CST - Filed by Patient   Do you agree to participate in an E-Visit? Yes   If you have any of the following symptoms, please present to your local ER or call 911:  I acknowledge   What is the main issue that you would like for your doctor to address today? Possible Sinus Infection   Are you able to take your vital signs? No   Are you currently pregnant, could you be pregnant, or are you breast feeding? None of the above   What symptoms do you currently have?  Cough;  Nasal Congestion;  Nausea;  Runny nose;  Sore throat;  Pain around the nose and face   Describe your cough: Productive (containing mucus);  Bothersome (interferes with daily activities)   Describe the mucus: Foamy;  Thick   Have you had any of the following? None of the above   Have you ever smoked? I have never smoked   Have you had a fever? No   When did your symptoms first appear? 11/14/2023   In the last two weeks, have you been in close contact with someone who has COVID-19 or the Flu? No   In the last two weeks, have you worked or volunteered in a healthcare facility or as a ? Healthcare facilities include a hospital, medical or dental clinic, long-term care facility, or nursing home No   Do you live in a long-term care facility, nursing home, group home, or homeless shelter? No   List what you have done or taken to help your symptoms. Zrytec allergy & Flonase nasal spray   How severe are your symptoms? Severe   Have your symptoms improved since they first appeared?  Worse   Have you taken an at home Covid test? No   Have you taken a Flu test? No   Have you been fully vaccinated for COVID? (2 Pfizer, 2 Moderna or 1 Ki & Ki vaccine injections) Yes   Have you received a booster? No   Have you recieved a Flu shot? No   Do you have transportation to get tested for COVID if it is indicated and ordered for you at an [facility] location? Yes   Provide any information you feel is important to your history not asked above    Please attach any relevant images or files          Recent Labs Obtained:  Lab Visit on 11/18/2023   Component Date Value Ref Range Status    WBC 11/18/2023 6.90  3.90 - 12.70 K/uL Final    RBC 11/18/2023 4.67  4.00 - 5.40 M/uL Final    Hemoglobin 11/18/2023 11.0 (L)  12.0 - 16.0 g/dL Final    Hematocrit 11/18/2023 38.1  37.0 - 48.5 % Final    MCV 11/18/2023 82  82 - 98 fL Final    MCH 11/18/2023 23.6 (L)  27.0 - 31.0 pg Final    MCHC 11/18/2023 28.9 (L)  32.0 - 36.0 g/dL Final    RDW 11/18/2023 14.8 (H)  11.5 - 14.5 % Final    Platelets 11/18/2023 341  150 - 450 K/uL Final    MPV 11/18/2023 9.4  9.2 - 12.9 fL Final    Immature Granulocytes 11/18/2023 0.3  0.0 - 0.5 % Final    Gran # (ANC) 11/18/2023 4.6  1.8 - 7.7 K/uL Final    Immature Grans (Abs) 11/18/2023 0.02  0.00 - 0.04 K/uL Final    Comment: Mild elevation in immature granulocytes is non specific and   can be seen in a variety of conditions including stress response,   acute inflammation, trauma and pregnancy. Correlation with other   laboratory and clinical findings is essential.      Lymph # 11/18/2023 1.8  1.0 - 4.8 K/uL Final    Mono # 11/18/2023 0.4  0.3 - 1.0 K/uL Final    Eos # 11/18/2023 0.1  0.0 - 0.5 K/uL Final    Baso # 11/18/2023 0.05  0.00 - 0.20 K/uL Final    nRBC 11/18/2023 0  0 /100 WBC Final    Gran % 11/18/2023 66.0  38.0 - 73.0 % Final    Lymph % 11/18/2023 25.7  18.0 - 48.0 % Final    Mono % 11/18/2023 5.9  4.0 - 15.0 % Final    Eosinophil % 11/18/2023 1.4  0.0 - 8.0 % Final     Basophil % 11/18/2023 0.7  0.0 - 1.9 % Final    Differential Method 11/18/2023 Automated   Final    Sodium 11/18/2023 142  136 - 145 mmol/L Final    Potassium 11/18/2023 4.4  3.5 - 5.1 mmol/L Final    Chloride 11/18/2023 107  95 - 110 mmol/L Final    CO2 11/18/2023 25  23 - 29 mmol/L Final    Glucose 11/18/2023 87  70 - 110 mg/dL Final    BUN 11/18/2023 12  6 - 20 mg/dL Final    Creatinine 11/18/2023 0.8  0.5 - 1.4 mg/dL Final    Calcium 11/18/2023 9.4  8.7 - 10.5 mg/dL Final    Total Protein 11/18/2023 7.0  6.0 - 8.4 g/dL Final    Albumin 11/18/2023 3.7  3.5 - 5.2 g/dL Final    Total Bilirubin 11/18/2023 0.4  0.1 - 1.0 mg/dL Final    Comment: For infants and newborns, interpretation of results should be based  on gestational age, weight and in agreement with clinical  observations.    Premature Infant recommended reference ranges:  Up to 24 hours.............<8.0 mg/dL  Up to 48 hours............<12.0 mg/dL  3-5 days..................<15.0 mg/dL  6-29 days.................<15.0 mg/dL      Alkaline Phosphatase 11/18/2023 78  55 - 135 U/L Final    AST 11/18/2023 12  10 - 40 U/L Final    ALT 11/18/2023 18  10 - 44 U/L Final    eGFR 11/18/2023 >60.0  >60 mL/min/1.73 m^2 Final    Anion Gap 11/18/2023 10  8 - 16 mmol/L Final    Cholesterol 11/18/2023 199  120 - 199 mg/dL Final    Comment: The National Cholesterol Education Program (NCEP) has set the  following guidelines (reference ranges) for Cholesterol:  Optimal.....................<200 mg/dL  Borderline High.............200-239 mg/dL  High........................> or = 240 mg/dL      Triglycerides 11/18/2023 83  30 - 150 mg/dL Final    Comment: The National Cholesterol Education Program (NCEP) has set the  following guidelines (reference values) for triglycerides:  Normal......................<150 mg/dL  Borderline High.............150-199 mg/dL  High........................200-499 mg/dL      HDL 11/18/2023 45  40 - 75 mg/dL Final    Comment: The National Cholesterol  Education Program (NCEP) has set the  following guidelines (reference values) for HDL Cholesterol:  Low...............<40 mg/dL  Optimal...........>60 mg/dL      LDL Cholesterol 11/18/2023 137.4  63.0 - 159.0 mg/dL Final    Comment: The National Cholesterol Education Program (NCEP) has set the  following guidelines (reference values) for LDL Cholesterol:  Optimal.......................<130 mg/dL  Borderline High...............130-159 mg/dL  High..........................160-189 mg/dL  Very High.....................>190 mg/dL      HDL/Cholesterol Ratio 11/18/2023 22.6  20.0 - 50.0 % Final    Total Cholesterol/HDL Ratio 11/18/2023 4.4  2.0 - 5.0 Final    Non-HDL Cholesterol 11/18/2023 154  mg/dL Final    Comment: Risk category and Non-HDL cholesterol goals:  Coronary heart disease (CHD)or equivalent (10-year risk of CHD >20%):  Non-HDL cholesterol goal     <130 mg/dL  Two or more CHD risk factors and 10-year risk of CHD <= 20%:  Non-HDL cholesterol goal     <160 mg/dL  0 to 1 CHD risk factor:  Non-HDL cholesterol goal     <190 mg/dL      Hemoglobin A1C 11/18/2023 5.9 (H)  4.0 - 5.6 % Final    Comment: ADA Screening Guidelines:  5.7-6.4%  Consistent with prediabetes  >or=6.5%  Consistent with diabetes    High levels of fetal hemoglobin interfere with the HbA1C  assay. Heterozygous hemoglobin variants (HbS, HgC, etc)do  not significantly interfere with this assay.   However, presence of multiple variants may affect accuracy.      Estimated Avg Glucose 11/18/2023 123  68 - 131 mg/dL Final    TSH 11/18/2023 1.194  0.400 - 4.000 uIU/mL Final       Encounter Diagnosis   Name Primary?    Other sinusitis, unspecified chronicity Yes        No orders of the defined types were placed in this encounter.     Medications Ordered This Encounter   Medications    azithromycin (Z-KRYSTAL) 250 MG tablet     Sig: Take 2 tablets by mouth on day 1; Take 1 tablet by mouth on days 2-5     Dispense:  6 tablet     Refill:  0        No follow-ups  on file.      E-Visit Time Tracking:    Day 1 Time (in minutes): 7     Total Time (in minutes): 7

## 2023-11-21 ENCOUNTER — OFFICE VISIT (OUTPATIENT)
Dept: PSYCHIATRY | Facility: CLINIC | Age: 48
End: 2023-11-21
Payer: COMMERCIAL

## 2023-11-21 ENCOUNTER — TELEPHONE (OUTPATIENT)
Dept: ENDOSCOPY | Facility: HOSPITAL | Age: 48
End: 2023-11-21
Payer: COMMERCIAL

## 2023-11-21 DIAGNOSIS — F31.77 BIPOLAR I DISORDER, MOST RECENT EPISODE MIXED, IN PARTIAL REMISSION: Primary | ICD-10-CM

## 2023-11-21 PROCEDURE — 90785 PR INTERACTIVE COMPLEXITY: ICD-10-PCS | Mod: 95,,, | Performed by: SOCIAL WORKER

## 2023-11-21 PROCEDURE — 3044F HG A1C LEVEL LT 7.0%: CPT | Mod: CPTII,95,, | Performed by: SOCIAL WORKER

## 2023-11-21 PROCEDURE — 90785 PSYTX COMPLEX INTERACTIVE: CPT | Mod: 95,,, | Performed by: SOCIAL WORKER

## 2023-11-21 PROCEDURE — 90834 PR PSYCHOTHERAPY W/PATIENT, 45 MIN: ICD-10-PCS | Mod: 95,,, | Performed by: SOCIAL WORKER

## 2023-11-21 PROCEDURE — 90834 PSYTX W PT 45 MINUTES: CPT | Mod: 95,,, | Performed by: SOCIAL WORKER

## 2023-11-21 PROCEDURE — 3044F PR MOST RECENT HEMOGLOBIN A1C LEVEL <7.0%: ICD-10-PCS | Mod: CPTII,95,, | Performed by: SOCIAL WORKER

## 2023-11-21 NOTE — TELEPHONE ENCOUNTER
Spoke to patient to reschedule procedure(s) Colonoscopy       Physician to perform procedure(s) Dr. RIN Mcclelland  Date of Procedure (s) 3/26/24  Arrival Time 12:15 PM  Time of Procedure(s) 1:15 PM   Location of Procedure(s) [facility] [address] Floor  Type of Rx Prep sent to patient: Suprep  Instructions provided to patient via [facility]    Patient was informed on the following information and verbalized understanding. Screening questionnaire reviewed with patient and complete. If procedure requires anesthesia, a responsible adult needs to be present to accompany the patient home, patient cannot drive after receiving anesthesia. Appointment details are tentative, especially check-in time. Patient will receive a prep-op call 7 days prior to confirm check-in time for procedure. If applicable the patient should contact their pharmacy to verify Rx for procedure prep is ready for pick-up. Patient was advised to call the scheduling department at [phone] if pharmacy states no Rx is available. Patient was advised to call the endoscopy scheduling department if any questions or concerns arise.      [facility] Scheduling Department

## 2023-11-21 NOTE — PROGRESS NOTES
"The patient location is: home  The chief complaint leading to consultation is: depression, anxiety    Visit type: audiovisual    Face to Face time with patient: 45 minutes  60 minutes of total time spent on the encounter, which includes face to face time and non-face to face time preparing to see the patient (eg, review of tests), Obtaining and/or reviewing separately obtained history, Documenting clinical information in the electronic or other health record, Independently interpreting results (not separately reported) and communicating results to the patient/family/caregiver, or Care coordination (not separately reported).     Each patient to whom he or she provides medical services by telemedicine is:  (1) informed of the relationship between the physician and patient and the respective role of any other health care provider with respect to management of the patient; and (2) notified that he or she may decline to receive medical services by telemedicine and may withdraw from such care at any time.    Notes:    Individual Psychotherapy (PhD/LCSW)    11/21/2023    Site:  Telemed         Therapeutic Intervention: Met with patient.  Outpatient - Insight oriented psychotherapy 45 min - CPT code 79724 and Outpatient - Supportive psychotherapy 45 min - CPT Code 81743    Chief complaint/reason for encounter: depression and anxiety     Interval history and content of current session:  Patient returns for psychotherapy. Patient reports that she is "okay". States that she has been sick for the last couple days. Had to push back several appointments due to being sick. Daughter is still coming for holiday. Feels like she is managing mood better the last couple of weeks. States that things have still been tight with the finances. Got accepted for financial assistance at [facility] and that has been extremely helpful. Discussed that she has been spending more money. Told  about other credit cards and he got very upset. Told " her to get a job but that stresses her out because of the kids and their schooling. Discussed talking with PA about medication and possibly needing a little change due to increase in impulsivity.     Scheduled follow up for 1/10 at 2pm    Treatment plan:  Target symptoms: depression, anxiety   Why chosen therapy is appropriate versus another modality: relevant to diagnosis, evidence based practice  Outcome monitoring methods: self-report, observation  Therapeutic intervention type: insight oriented psychotherapy, supportive psychotherapy    Risk parameters:  Patient reports no suicidal ideation  Patient reports no homicidal ideation  Patient reports no self-injurious behavior  Patient reports no violent behavior    Verbal deficits: None    Patient's response to intervention:  The patient's response to intervention is accepting.    Progress toward goals and other mental status changes:  The patient's progress toward goals is good.    Diagnosis:     ICD-10-CM ICD-9-CM   1. Bipolar I disorder, most recent episode mixed, in partial remission  F31.77 296.65         Plan:  individual psychotherapy and medication management by physician    Return to clinic: 1 month    Length of Service (minutes): 60      Cassandra Rockweiler, LCSW-LEONAS

## 2023-11-30 ENCOUNTER — HOSPITAL ENCOUNTER (OUTPATIENT)
Dept: RADIOLOGY | Facility: HOSPITAL | Age: 48
Discharge: HOME | End: 2023-11-30
Attending: FAMILY MEDICINE
Payer: COMMERCIAL

## 2023-11-30 DIAGNOSIS — K43.9 VENTRAL HERNIA WITHOUT OBSTRUCTION OR GANGRENE: ICD-10-CM

## 2023-11-30 DIAGNOSIS — R10.10 PAIN OF UPPER ABDOMEN: ICD-10-CM

## 2023-11-30 PROCEDURE — 74177 CT ABDOMEN PELVIS WITH IV CONTRAST: ICD-10-PCS | Mod: 26,,, | Performed by: RADIOLOGY

## 2023-11-30 PROCEDURE — 25500020 PHARM REV CODE 255: Performed by: FAMILY MEDICINE

## 2023-11-30 PROCEDURE — 74177 CT ABD & PELVIS W/CONTRAST: CPT | Mod: 26,,, | Performed by: RADIOLOGY

## 2023-11-30 PROCEDURE — 74177 CT ABD & PELVIS W/CONTRAST: CPT | Mod: TC

## 2023-11-30 RX ADMIN — IOHEXOL 100 ML: 350 INJECTION, SOLUTION INTRAVENOUS at 08:11

## 2023-11-30 RX ADMIN — IOHEXOL 15 ML: 300 INJECTION, SOLUTION INTRAVENOUS at 08:11

## 2023-12-07 ENCOUNTER — TELEPHONE (OUTPATIENT)
Dept: GASTROENTEROLOGY | Facility: CLINIC | Age: 48
End: 2023-12-07
Payer: COMMERCIAL

## 2023-12-07 NOTE — TELEPHONE ENCOUNTER
----- Message from Eamon Resee sent at 12/7/2023  4:05 PM CST -----  Regarding: [facility]  Type: Patient Call Back    Who called:[facility]     What is the request in detail: called in regards to checking on a status of a prescriptio    Can the clinic reply by [facility]? No     Would the patient rather a call back or a response via [facility]? Call back     Best call back number: [phone] ref # [id number]    Additional Information:    Thank you.

## 2024-01-10 ENCOUNTER — PATIENT MESSAGE (OUTPATIENT)
Dept: PSYCHIATRY | Facility: CLINIC | Age: 49
End: 2024-01-10

## 2024-01-12 ENCOUNTER — PATIENT MESSAGE (OUTPATIENT)
Dept: ADMINISTRATIVE | Facility: OTHER | Age: 49
End: 2024-01-12
Payer: COMMERCIAL

## 2024-01-19 ENCOUNTER — TELEPHONE (OUTPATIENT)
Dept: PULMONOLOGY | Facility: CLINIC | Age: 49
End: 2024-01-19
Payer: COMMERCIAL

## 2024-01-19 NOTE — TELEPHONE ENCOUNTER
----- Message from [place] LIN Route sent at 1/19/2024  2:10 PM CST -----  Regarding: C-Pap Supplies  Contact: Aron   Raffi with [facility] is calling in ref to pt's C-Pap Supplies. Raffi says a prescription was faxed over on 1/18 checking status. Raffi is also asking rather or not pt needs a follow up appt before receiving the C-Pap Supplies. Best Call Back Number: Aron  Fax    Ref# [id number]

## 2024-01-19 NOTE — TELEPHONE ENCOUNTER
I called [facility] and told them I have not recieved any fax about this patient and Dr Dorsey has  never seen her before. She does have a appointment with Dr Dorsey at the [facility] location on 2-8-24. I was told they will send another form today and I will hold on to it for Dr Dorsey. Elma Sage

## 2024-01-30 ENCOUNTER — OFFICE VISIT (OUTPATIENT)
Dept: PSYCHIATRY | Facility: CLINIC | Age: 49
End: 2024-01-30
Payer: COMMERCIAL

## 2024-01-30 DIAGNOSIS — F31.77 BIPOLAR I DISORDER, MOST RECENT EPISODE MIXED, IN PARTIAL REMISSION: Primary | ICD-10-CM

## 2024-01-30 PROCEDURE — 90785 PSYTX COMPLEX INTERACTIVE: CPT | Mod: 95,,, | Performed by: SOCIAL WORKER

## 2024-01-30 PROCEDURE — 90837 PSYTX W PT 60 MINUTES: CPT | Mod: 95,,, | Performed by: SOCIAL WORKER

## 2024-01-30 NOTE — PROGRESS NOTES
"The patient location is: home  The chief complaint leading to consultation is: depression, anxiety    Visit type: audiovisual    Face to Face time with patient: 55 minutes  65 minutes of total time spent on the encounter, which includes face to face time and non-face to face time preparing to see the patient (eg, review of tests), Obtaining and/or reviewing separately obtained history, Documenting clinical information in the electronic or other health record, Independently interpreting results (not separately reported) and communicating results to the patient/family/caregiver, or Care coordination (not separately reported).     Each patient to whom he or she provides medical services by telemedicine is:  (1) informed of the relationship between the physician and patient and the respective role of any other health care provider with respect to management of the patient; and (2) notified that he or she may decline to receive medical services by telemedicine and may withdraw from such care at any time.    Notes:    Individual Psychotherapy (PhD/LCSW)    1/30/2024    Site:  Telemed         Therapeutic Intervention: Met with patient.  Outpatient - Insight oriented psychotherapy 60 min - CPT code 66161 and Outpatient - Supportive psychotherapy 60 min - CPT Code 43062    Chief complaint/reason for encounter: depression and anxiety     Interval history and content of current session:  Patient returns for psychotherapy. Patient reports that she is "a mess". States that she has been napping more than normal. States that she is taking 2-3 naps a day. Will get up and do what she needs to for her children and then take a nap. States that fatigue started when she got COVID recently. Denies feeling bad mentally. Does feel mentally drained with taking on all the things with children. States that she is one month behind on their mortgage and that has taken a toll on her. Discussed proactive communication with  to reduce " tension with finances. Discussed how stress from bills and being overwhelmed with it could be causing her to feel more down.     Scheduled follow up for 3/12 at 2    Treatment plan:  Target symptoms: depression, anxiety   Why chosen therapy is appropriate versus another modality: relevant to diagnosis, evidence based practice  Outcome monitoring methods: self-report, observation  Therapeutic intervention type: insight oriented psychotherapy, supportive psychotherapy    Risk parameters:  Patient reports no suicidal ideation  Patient reports no homicidal ideation  Patient reports no self-injurious behavior  Patient reports no violent behavior    Verbal deficits: None    Patient's response to intervention:  The patient's response to intervention is accepting.    Progress toward goals and other mental status changes:  The patient's progress toward goals is good.    Diagnosis:     ICD-10-CM ICD-9-CM   1. Bipolar I disorder, most recent episode mixed, in partial remission  F31.77 296.65       Plan:  individual psychotherapy and medication management by physician    Return to clinic: 1 month    Length of Service (minutes): 60      Cassandra Rockweiler, LCS-BACS

## 2024-02-08 ENCOUNTER — OFFICE VISIT (OUTPATIENT)
Dept: PULMONOLOGY | Facility: CLINIC | Age: 49
End: 2024-02-08
Payer: COMMERCIAL

## 2024-02-08 VITALS
OXYGEN SATURATION: 97 % | HEART RATE: 97 BPM | DIASTOLIC BLOOD PRESSURE: 76 MMHG | SYSTOLIC BLOOD PRESSURE: 115 MMHG | BODY MASS INDEX: 43.05 KG/M2 | HEIGHT: 66 IN | WEIGHT: 267.88 LBS

## 2024-02-08 DIAGNOSIS — G47.33 OSA (OBSTRUCTIVE SLEEP APNEA): ICD-10-CM

## 2024-02-08 DIAGNOSIS — E66.01 CLASS 3 OBESITY: Primary | ICD-10-CM

## 2024-02-08 PROBLEM — E66.813 CLASS 3 OBESITY: Status: ACTIVE | Noted: 2024-02-08

## 2024-02-08 PROCEDURE — 99214 OFFICE O/P EST MOD 30 MIN: CPT | Mod: S$GLB,,, | Performed by: INTERNAL MEDICINE

## 2024-02-08 PROCEDURE — 1159F MED LIST DOCD IN RCRD: CPT | Mod: CPTII,S$GLB,, | Performed by: INTERNAL MEDICINE

## 2024-02-08 PROCEDURE — 1160F RVW MEDS BY RX/DR IN RCRD: CPT | Mod: CPTII,S$GLB,, | Performed by: INTERNAL MEDICINE

## 2024-02-08 PROCEDURE — 3074F SYST BP LT 130 MM HG: CPT | Mod: CPTII,S$GLB,, | Performed by: INTERNAL MEDICINE

## 2024-02-08 PROCEDURE — 99999 PR PBB SHADOW E&M-EST. PATIENT-LVL III: CPT | Mod: PBBFAC,,, | Performed by: INTERNAL MEDICINE

## 2024-02-08 PROCEDURE — 3078F DIAST BP <80 MM HG: CPT | Mod: CPTII,S$GLB,, | Performed by: INTERNAL MEDICINE

## 2024-02-08 PROCEDURE — 3008F BODY MASS INDEX DOCD: CPT | Mod: CPTII,S$GLB,, | Performed by: INTERNAL MEDICINE

## 2024-02-08 RX ORDER — AZELASTINE 1 MG/ML
1 SPRAY, METERED NASAL 2 TIMES DAILY
Qty: 30 ML | Refills: 3 | Status: SHIPPED | OUTPATIENT
Start: 2024-02-08 | End: 2025-02-07

## 2024-02-08 NOTE — PROGRESS NOTES
Patient presents today for follow up visit. Previously seen by Lelia Powell NP.    CHIEF COMPLAINT:    Chief Complaint   Patient presents with    Apnea       HISTORY OF PRESENT ILLNESS: Zonia Skaggs is a 48 y.o. female with  has a past medical history of Bipolar 1 disorder, GERD (gastroesophageal reflux disease), History of psychiatric hospitalization, psychiatric care, Psychiatric problem, Seasonal allergies, Therapy, and Vaginal delivery.     Zonia Skaggs underwent a home sleep study on 9/7/22 where the AHI was 31, the RDI was 47. The patient weight 261 lbs and had BMI of 47.      PAP history   DME [facility] phone number- [phone]     Mask Dream wisp   Pressure 4-20   Problems Too much humidity   Machine age    Download   Residual AHI of 0.4. patient using most nights.       Reports sleep quality is improving. Does still have sleepiness during the daytime, but not as bad as before. She has several medications for bi-polar disorder.    Recently had Covid with residual sinus congestion and nasal drip. Frequent need to clear throat. Also has a new dog, and has more allergic symptoms since then. Using flonase and Cetrizine    SLEEP ROUTINE:  Bedtime 10:00 p.m., 30 minutes sleep onset, out of bed 5:30 a.m..  Wakes up during the night about 3 times without problems falling back to sleep.  Naps about once for about 60 minutes around 3:00 p.m.  Patient has 5 children between the ages of 6 and 23.  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  She is working on weight loss and working out to [employer] program.  She also does yoga and meditation.      PAST MEDICAL HISTORY:    Active Ambulatory Problems     Diagnosis Date Noted    Boxer's fracture, closed, initial encounter 06/22/2018    Bipolar disorder, current episode manic severe with psychotic features 05/21/2019    Iron deficiency anemia 05/21/2019    Lower abdominal pain 05/21/2019    Laceration of right eye 11/20/2019     Borderline diabetes 11/20/2019    Seasonal allergic rhinitis 11/20/2019    Gastroesophageal reflux disease without esophagitis 11/20/2019    Abnormal urinalysis 11/20/2019    Sleep-related breathing disorder 08/23/2022    Class 3 obesity 02/08/2024     Resolved Ambulatory Problems     Diagnosis Date Noted    Ventral hernia without obstruction or gangrene 07/17/2019    Homicidal ideations 11/20/2019     Past Medical History:   Diagnosis Date    Bipolar 1 disorder     GERD (gastroesophageal reflux disease)     History of psychiatric hospitalization     Hx of psychiatric care     Psychiatric problem     Seasonal allergies     Therapy     Vaginal delivery                 PAST SURGICAL HISTORY:    Past Surgical History:   Procedure Laterality Date    COLONOSCOPY N/A 4/20/2023    Procedure: COLONOSCOPY;  Surgeon: Juan Antonio Mcclelland MD;  Location: [facility] ([address] [place] [address]);  Service: Endoscopy;  Laterality: N/A;  instr portal-GT  pre call no answer-as  confirmed earlier time 4/19 EB    ESOPHAGOGASTRODUODENOSCOPY N/A 1/6/2023    Procedure: ESOPHAGOGASTRODUODENOSCOPY (EGD);  Surgeon: Juan Antonio Mcclelland MD;  Location: [address]);  Service: Endoscopy;  Laterality: N/A;  instr portal-GT    FINGER FRACTURE SURGERY      REPAIR OF INCARCERATED VENTRAL HERNIA WITHOUT HISTORY OF PRIOR REPAIR N/A 7/23/2019    Procedure: REPAIR, HERNIA, VENTRAL, INCARCERATED, WITHOUT HISTORY OF PRIOR REPAIR;  Surgeon: Bandar Madden MD;  Location: [facility];  Service: General;  Laterality: N/A;         FAMILY HISTORY:                Family History   Problem Relation Age of Onset    Bipolar disorder Maternal Aunt     Bipolar disorder Paternal Aunt     Diabetes type II Mother     Diabetes Mother     Hyperlipidemia Mother     Breast cancer Other     Diabetes Other     Colon cancer Neg Hx     Ovarian cancer Neg Hx     Anxiety disorder Neg Hx     Depression Neg Hx     Suicide Neg Hx     Stroke Neg Hx     Hypertension Neg Hx        SOCIAL HISTORY:         "  Tobacco:   Social History     Tobacco Use   Smoking Status Never   Smokeless Tobacco Never       alcohol use:    Social History     Substance and Sexual Activity   Alcohol Use Yes    Comment: rarely                   ALLERGIES:    Review of patient's allergies indicates:   Allergen Reactions    Penicillins Hives and Shortness Of Breath    Morphine        CURRENT MEDICATIONS:    Current Outpatient Medications   Medication Sig Dispense Refill    buPROPion (WELLBUTRIN XL) 150 MG TB24 tablet Take 1 tablet (150 mg total) by mouth once daily. 90 tablet 2    cariprazine (VRAYLAR) 1.5 mg Cap Take 1 capsule (1.5 mg total) by mouth once daily. 90 capsule 2    citalopram (CELEXA) 20 MG tablet Take 1 tablet (20 mg total) by mouth every evening. 90 tablet 2    ferrous sulfate (IRON ORAL) Take by mouth.      fluticasone propionate (FLONASE) 50 mcg/actuation nasal spray 1 spray (50 mcg total) by Each Nostril route once daily. 16 g 5    multivitamin with minerals tablet Take 1 tablet by mouth once daily.      omeprazole (PRILOSEC) 20 MG capsule Take 20 mg by mouth once daily.      cetirizine (ZYRTEC) 5 MG tablet Take 1 tablet (5 mg total) by mouth once daily. 30 tablet 0     No current facility-administered medications for this visit.                     PHYSICAL EXAM:  Vitals:    02/08/24 1502   BP: 115/76   Pulse: 97   SpO2: 97%   Weight: 121.5 kg (267 lb 13.7 oz)   Height: 5' 6" (1.676 m)   PainSc: 0-No pain     Body mass index is 43.23 kg/m².   Physical Exam   Constitutional: She appears well-developed. She is obese.   Pulmonary/Chest: Effort normal.   Psychiatric: She has a normal mood and affect. Her behavior is normal.                                    DATA:  TSH:  Lab Results   Component Value Date    TSH 1.194 11/18/2023     CBC:  Lab Results   Component Value Date    WBC 6.90 11/18/2023    HGB 11.0 (L) 11/18/2023    HCT 38.1 11/18/2023    MCV 82 11/18/2023     11/18/2023     BMP:  Lab Results   Component Value " Date     11/18/2023    K 4.4 11/18/2023     11/18/2023    CO2 25 11/18/2023    BUN 12 11/18/2023    CREATININE 0.8 11/18/2023    CALCIUM 9.4 11/18/2023    ANIONGAP 10 11/18/2023    ESTGFRAFRICA >60.0 09/08/2021    EGFRNONAA >60.0 09/08/2021     HgbA1C:  Lab Results   Component Value Date    HGBA1C 5.9 (H) 11/18/2023              ASSESSMENT    ICD-10-CM ICD-9-CM    1. Class 3 obesity  E66.01 278.01       2. DEVAUGHN (obstructive sleep apnea)  G47.33 327.23 CPAP/BIPAP SUPPLIES          The results of the  Home Sleep Study was reviewed with the patient.   Patient was educated on the pathophysiology and the prognosis associated with obstructive sleep apnea.     Patient is on PAP therapy at this time  Excellent Compliance  good Efficacy  Residual sleepiness unlikely explained by sleep apnea as it is being treated  - Teaching in regards to PAP use and mask adjustment was given to the patient during the visit today.  - Use PAP >4hours per night for 7 nights per week  - Use PAP for any daytime sleeping  - Interface patient chose that was prescribed today: nasal mask  - Engage in a weight loss program through diet and exercise plan  - Prescription for PAP device supplies will be send to the [facility] today. Filter, mask, tube with climate line and humidification system.      Patient suffers from a complex medical condition and if sleep disordered breathing is not properly treated it will increase her morbidity and mortality and patient is aware that has to be optimally compliant with therapy. Sleep disordered breathing has been associated with uncontrolled hypertension, insulin resistance, pulmonary hypertension, dementia, glaucoma, cardiac arrhythmias, cerebro vascular accident and multiple other conditions when not treated appropriately. Patient benefits from PAP therapy.      Post nasal drip:  - start Azelastine 1 senbid after Flonase 1 senbid    RTC annually

## 2024-02-14 RX ORDER — BUPROPION HYDROCHLORIDE 150 MG/1
150 TABLET ORAL
Qty: 90 TABLET | Refills: 0 | Status: SHIPPED | OUTPATIENT
Start: 2024-02-14 | End: 2024-05-17

## 2024-03-06 ENCOUNTER — TELEPHONE (OUTPATIENT)
Dept: ENDOSCOPY | Facility: HOSPITAL | Age: 49
End: 2024-03-06
Payer: COMMERCIAL

## 2024-03-06 VITALS — WEIGHT: 267 LBS | BODY MASS INDEX: 42.91 KG/M2 | HEIGHT: 66 IN

## 2024-03-06 NOTE — TELEPHONE ENCOUNTER
Incoming call  Spoke to patient  Reason for the call: patient is asking to reschedule  Information confirmed:   Date of procedure:  03/26/24  Arrival time: 1:15 pm  Procedure (s): colonoscopy  Prep: patient picked up prep

## 2024-03-06 NOTE — TELEPHONE ENCOUNTER
Spoke to patient to schedule procedure(s) Colonoscopy       Physician to perform procedure(s) Dr. RIN Mcclelland  Date of Procedure (s) 5/14/24  Arrival Time 1:15 PM  Time of Procedure(s) 2:15 PM   Location of Procedure(s) [place] 4th Floor  Type of Rx Prep sent to patient: Suprep  Instructions provided to patient via [facility]    Patient was informed on the following information and verbalized understanding. Screening questionnaire reviewed with patient and complete. If procedure requires anesthesia, a responsible adult needs to be present to accompany the patient home, patient cannot drive after receiving anesthesia. Appointment details are tentative, especially check-in time. Patient will receive a prep-op call 7 days prior to confirm check-in time for procedure. If applicable the patient should contact their pharmacy to verify Rx for procedure prep is ready for pick-up. Patient was advised to call the scheduling department at [phone] if pharmacy states no Rx is available. Patient was advised to call the endoscopy scheduling department if any questions or concerns arise.      [facility] Scheduling Department

## 2024-03-06 NOTE — TELEPHONE ENCOUNTER
"----- Message from Avril Gale sent at 9/6/2023  8:28 AM CDT -----     ----- Message -----  From: Juan Antonio Mcclelland MD  Sent: 7/17/2023  12:00 AM CDT  To: [facility]     Procedure: Colonoscopy     Diagnosis: Surveillance colonoscopy - Hx of colon polyps and advanced Sessile serrated lesion.      Procedure Timing:  October 2023     #If within 4 weeks selected, please nithin as high priority#     #If greater than 12 weeks, please select "5-12 weeks" and delay sending until 2 months prior to requested date#      Provider: Myself     Location: [address]     Additional Scheduling Information:  Please schedule patient for colonoscopy with me surveillance in October 2023     Prep Specifications:Standard prep     Have you attached a patient to this message: Yes and No        "

## 2024-03-12 ENCOUNTER — OFFICE VISIT (OUTPATIENT)
Dept: PSYCHIATRY | Facility: CLINIC | Age: 49
End: 2024-03-12
Payer: COMMERCIAL

## 2024-03-12 DIAGNOSIS — F31.77 BIPOLAR I DISORDER, MOST RECENT EPISODE MIXED, IN PARTIAL REMISSION: Primary | ICD-10-CM

## 2024-03-12 PROCEDURE — 90837 PSYTX W PT 60 MINUTES: CPT | Mod: 95,,, | Performed by: SOCIAL WORKER

## 2024-03-12 PROCEDURE — 90785 PSYTX COMPLEX INTERACTIVE: CPT | Mod: 95,,, | Performed by: SOCIAL WORKER

## 2024-03-12 NOTE — PROGRESS NOTES
"The patient location is: home  The chief complaint leading to consultation is: depression, anxiety    Visit type: audiovisual    Face to Face time with patient: 55 minutes  65 minutes of total time spent on the encounter, which includes face to face time and non-face to face time preparing to see the patient (eg, review of tests), Obtaining and/or reviewing separately obtained history, Documenting clinical information in the electronic or other health record, Independently interpreting results (not separately reported) and communicating results to the patient/family/caregiver, or Care coordination (not separately reported).     Each patient to whom he or she provides medical services by telemedicine is:  (1) informed of the relationship between the physician and patient and the respective role of any other health care provider with respect to management of the patient; and (2) notified that he or she may decline to receive medical services by telemedicine and may withdraw from such care at any time.    Notes:    Individual Psychotherapy (PhD/LCSW)    3/12/2024    Site:  Telemed         Therapeutic Intervention: Met with patient.  Outpatient - Insight oriented psychotherapy 60 min - CPT code 65096 and Outpatient - Supportive psychotherapy 60 min - CPT Code 67653    Chief complaint/reason for encounter: depression and anxiety     Interval history and content of current session:  Patient returns for psychotherapy. Patient reports that she is "tired". States that the time change has been hard on family. Has been trying to do something for her but then  will make comments that she is selfish. Is part of a book club but is behind and has to try to get caught up by the end of the month. Things with children are doing well. States that finances have been better. Got more than expected with tax refund. Discussed that she would like to be more focused with getting things situated for the children and their home " schooling. States that she wants to find ways for children to socialize with other children that they don't get at home. Discussed upcoming changes to clinic and what that means for appointments going forward.     Scheduled follow up for 4/17 at 2pm    Treatment plan:  Target symptoms: depression, anxiety   Why chosen therapy is appropriate versus another modality: relevant to diagnosis, evidence based practice  Outcome monitoring methods: self-report, observation  Therapeutic intervention type: insight oriented psychotherapy, supportive psychotherapy    Risk parameters:  Patient reports no suicidal ideation  Patient reports no homicidal ideation  Patient reports no self-injurious behavior  Patient reports no violent behavior    Verbal deficits: None    Patient's response to intervention:  The patient's response to intervention is accepting.    Progress toward goals and other mental status changes:  The patient's progress toward goals is good.    Diagnosis:     ICD-10-CM ICD-9-CM   1. Bipolar I disorder, most recent episode mixed, in partial remission  F31.77 296.65         Plan:  individual psychotherapy and medication management by physician    Return to clinic: 1 month    Length of Service (minutes): 60      Cassandra Rockweiler, LCSW-NEDA

## 2024-04-17 ENCOUNTER — OFFICE VISIT (OUTPATIENT)
Dept: PSYCHIATRY | Facility: CLINIC | Age: 49
End: 2024-04-17
Payer: COMMERCIAL

## 2024-04-17 DIAGNOSIS — F31.77 BIPOLAR I DISORDER, MOST RECENT EPISODE MIXED, IN PARTIAL REMISSION: Primary | ICD-10-CM

## 2024-04-17 PROCEDURE — 90837 PSYTX W PT 60 MINUTES: CPT | Mod: 95,,, | Performed by: SOCIAL WORKER

## 2024-04-17 PROCEDURE — 90785 PSYTX COMPLEX INTERACTIVE: CPT | Mod: 95,,, | Performed by: SOCIAL WORKER

## 2024-04-17 NOTE — PROGRESS NOTES
"The patient location is: home  The chief complaint leading to consultation is: depression, anxiety    Visit type: audiovisual    Face to Face time with patient: 55 minutes  65 minutes of total time spent on the encounter, which includes face to face time and non-face to face time preparing to see the patient (eg, review of tests), Obtaining and/or reviewing separately obtained history, Documenting clinical information in the electronic or other health record, Independently interpreting results (not separately reported) and communicating results to the patient/family/caregiver, or Care coordination (not separately reported).     Each patient to whom he or she provides medical services by telemedicine is:  (1) informed of the relationship between the physician and patient and the respective role of any other health care provider with respect to management of the patient; and (2) notified that he or she may decline to receive medical services by telemedicine and may withdraw from such care at any time.    Notes:    Individual Psychotherapy (PhD/LCSW)    4/17/2024    Site:  Telemed         Therapeutic Intervention: Met with patient.  Outpatient - Insight oriented psychotherapy 60 min - CPT code 69345 and Outpatient - Supportive psychotherapy 60 min - CPT Code 85252    Chief complaint/reason for encounter: depression and anxiety     Interval history and content of current session:  Patient returns for psychotherapy. Patient reports that she is "good". Going to [place] next week to bring her daughter her car. States that her mood has been good. States that she is having a hard time getting up and going in the day. Going to bed between 9-11. Worries about going there and doing to much. Daughter and  are very active people and patient is not. Also worries about going on a plane and not being able to fit in the seat. Reports that she is still meditating to help with her mood. Is still doing her book club for " socialization. Would like to start bullet journaling but gets overwhelmed with all the projects she wants to start.     Scheduled follow up for 5/15 at 2pm    Treatment plan:  Target symptoms: depression, anxiety   Why chosen therapy is appropriate versus another modality: relevant to diagnosis, evidence based practice  Outcome monitoring methods: self-report, observation  Therapeutic intervention type: insight oriented psychotherapy, supportive psychotherapy    Risk parameters:  Patient reports no suicidal ideation  Patient reports no homicidal ideation  Patient reports no self-injurious behavior  Patient reports no violent behavior    Verbal deficits: None    Patient's response to intervention:  The patient's response to intervention is accepting.    Progress toward goals and other mental status changes:  The patient's progress toward goals is good.    Diagnosis:     ICD-10-CM ICD-9-CM   1. Bipolar I disorder, most recent episode mixed, in partial remission  F31.77 296.65       Plan:  individual psychotherapy and medication management by physician    Return to clinic: 1 month    Length of Service (minutes): 60    Cassandra Rockweiler, LCS-BACS

## 2024-05-13 ENCOUNTER — TELEPHONE (OUTPATIENT)
Dept: ENDOSCOPY | Facility: HOSPITAL | Age: 49
End: 2024-05-13
Payer: COMMERCIAL

## 2024-05-13 NOTE — TELEPHONE ENCOUNTER
Contacted Pt for endoscopy pre-call for upcoming procedure.  Pre-call complete, Pt does not have any further questions. Arrival time:06:30 am

## 2024-05-14 ENCOUNTER — HOSPITAL ENCOUNTER (OUTPATIENT)
Facility: HOSPITAL | Age: 49
Discharge: HOME | End: 2024-05-14
Attending: INTERNAL MEDICINE | Admitting: INTERNAL MEDICINE
Payer: COMMERCIAL

## 2024-05-14 ENCOUNTER — ANESTHESIA (OUTPATIENT)
Dept: ENDOSCOPY | Facility: HOSPITAL | Age: 49
End: 2024-05-14
Payer: COMMERCIAL

## 2024-05-14 ENCOUNTER — ANESTHESIA EVENT (OUTPATIENT)
Dept: ENDOSCOPY | Facility: HOSPITAL | Age: 49
End: 2024-05-14
Payer: COMMERCIAL

## 2024-05-14 VITALS
SYSTOLIC BLOOD PRESSURE: 154 MMHG | WEIGHT: 270 LBS | RESPIRATION RATE: 16 BRPM | OXYGEN SATURATION: 97 % | HEIGHT: 65 IN | HEART RATE: 84 BPM | BODY MASS INDEX: 44.98 KG/M2 | DIASTOLIC BLOOD PRESSURE: 85 MMHG | TEMPERATURE: 98 F

## 2024-05-14 DIAGNOSIS — K63.5 COLON POLYP: ICD-10-CM

## 2024-05-14 LAB
B-HCG UR QL: NEGATIVE
CTP QC/QA: YES

## 2024-05-14 PROCEDURE — 81025 URINE PREGNANCY TEST: CPT | Performed by: INTERNAL MEDICINE

## 2024-05-14 PROCEDURE — 27201089 HC SNARE, DISP (ANY): Performed by: INTERNAL MEDICINE

## 2024-05-14 PROCEDURE — 88305 TISSUE EXAM BY PATHOLOGIST: CPT | Mod: 26,,, | Performed by: PATHOLOGY

## 2024-05-14 PROCEDURE — 45385 COLONOSCOPY W/LESION REMOVAL: CPT | Mod: 33,,, | Performed by: INTERNAL MEDICINE

## 2024-05-14 PROCEDURE — E9220 PRA ENDO ANESTHESIA: HCPCS | Mod: 33,,, | Performed by: NURSE ANESTHETIST, CERTIFIED REGISTERED

## 2024-05-14 PROCEDURE — 25000003 PHARM REV CODE 250: Performed by: INTERNAL MEDICINE

## 2024-05-14 PROCEDURE — 27200997: Performed by: INTERNAL MEDICINE

## 2024-05-14 PROCEDURE — 37000008 HC ANESTHESIA 1ST 15 MINUTES: Performed by: INTERNAL MEDICINE

## 2024-05-14 PROCEDURE — 45385 COLONOSCOPY W/LESION REMOVAL: CPT | Mod: 33 | Performed by: INTERNAL MEDICINE

## 2024-05-14 PROCEDURE — 63600175 PHARM REV CODE 636 W HCPCS: Performed by: NURSE ANESTHETIST, CERTIFIED REGISTERED

## 2024-05-14 PROCEDURE — 37000009 HC ANESTHESIA EA ADD 15 MINS: Performed by: INTERNAL MEDICINE

## 2024-05-14 PROCEDURE — 88305 TISSUE EXAM BY PATHOLOGIST: CPT | Mod: 59 | Performed by: PATHOLOGY

## 2024-05-14 PROCEDURE — 25000003 PHARM REV CODE 250: Performed by: NURSE ANESTHETIST, CERTIFIED REGISTERED

## 2024-05-14 RX ORDER — SODIUM CHLORIDE 9 MG/ML
INJECTION, SOLUTION INTRAVENOUS CONTINUOUS
Status: DISCONTINUED | OUTPATIENT
Start: 2024-05-14 | End: 2024-05-14 | Stop reason: HOSPADM

## 2024-05-14 RX ORDER — PROPOFOL 10 MG/ML
VIAL (ML) INTRAVENOUS CONTINUOUS PRN
Status: DISCONTINUED | OUTPATIENT
Start: 2024-05-14 | End: 2024-05-14

## 2024-05-14 RX ORDER — PROPOFOL 10 MG/ML
VIAL (ML) INTRAVENOUS
Status: DISCONTINUED | OUTPATIENT
Start: 2024-05-14 | End: 2024-05-14

## 2024-05-14 RX ORDER — LIDOCAINE HYDROCHLORIDE 20 MG/ML
INJECTION INTRAVENOUS
Status: DISCONTINUED | OUTPATIENT
Start: 2024-05-14 | End: 2024-05-14

## 2024-05-14 RX ADMIN — Medication 40 MG: at 07:05

## 2024-05-14 RX ADMIN — SODIUM CHLORIDE: 0.9 INJECTION, SOLUTION INTRAVENOUS at 06:05

## 2024-05-14 RX ADMIN — LIDOCAINE HYDROCHLORIDE 50 MG: 20 INJECTION INTRAVENOUS at 07:05

## 2024-05-14 RX ADMIN — Medication 80 MG: at 07:05

## 2024-05-14 RX ADMIN — PROPOFOL 150 MCG/KG/MIN: 10 INJECTION, EMULSION INTRAVENOUS at 07:05

## 2024-05-14 NOTE — H&P
[facility] 4th Floor  History & Physical    Subjective:      Chief Complaint/Reason for Admission:     Surveillance colonoscopy for advanced sessile serrated colon polyp    Zonia Skaggs is a 48 y.o. female.    Past Medical History:   Diagnosis Date    Bipolar 1 disorder     GERD (gastroesophageal reflux disease)     History of psychiatric hospitalization     Hx of psychiatric care     Psychiatric problem     Seasonal allergies     Therapy     used to follow with Dr. Gerber Rogers    Vaginal delivery     x5     Past Surgical History:   Procedure Laterality Date    COLONOSCOPY N/A 4/20/2023    Procedure: COLONOSCOPY;  Surgeon: Juan Antonio Mcclelland MD;  Location: [address]);  Service: Endoscopy;  Laterality: N/A;  instr portal-GT  pre call no answer-as  confirmed earlier time 4/19 EB    ESOPHAGOGASTRODUODENOSCOPY N/A 1/6/2023    Procedure: ESOPHAGOGASTRODUODENOSCOPY (EGD);  Surgeon: Juan Antonio Mcclelland MD;  Location: [address]);  Service: Endoscopy;  Laterality: N/A;  instr portal-GT    FINGER FRACTURE SURGERY      REPAIR OF INCARCERATED VENTRAL HERNIA WITHOUT HISTORY OF PRIOR REPAIR N/A 7/23/2019    Procedure: REPAIR, HERNIA, VENTRAL, INCARCERATED, WITHOUT HISTORY OF PRIOR REPAIR;  Surgeon: Bandar Madden MD;  Location: [facility];  Service: General;  Laterality: N/A;     Social History     Tobacco Use    Smoking status: Never    Smokeless tobacco: Never   Substance Use Topics    Alcohol use: Yes     Comment: rarely    Drug use: Never       PTA Medications   Medication Sig    azelastine (ASTELIN) 137 mcg (0.1 %) nasal spray 1 spray (137 mcg total) by Nasal route 2 (two) times daily.    buPROPion (WELLBUTRIN XL) 150 MG TB24 tablet TAKE 1 TABLET BY MOUTH EVERY DAY    cetirizine (ZYRTEC) 5 MG tablet Take 1 tablet (5 mg total) by mouth once daily.    citalopram (CELEXA) 20 MG tablet Take 1 tablet (20 mg total) by mouth every evening.    ferrous sulfate (IRON ORAL) Take by mouth.    fluticasone  propionate (FLONASE) 50 mcg/actuation nasal spray 1 spray (50 mcg total) by Each Nostril route once daily.    multivitamin with minerals tablet Take 1 tablet by mouth once daily.    omeprazole (PRILOSEC) 20 MG capsule Take 20 mg by mouth once daily.     Review of patient's allergies indicates:   Allergen Reactions    Penicillins Hives and Shortness Of Breath    Morphine         Review of Systems   Constitutional:  Negative for fever.       Objective:      Vital Signs (Most Recent)       Vital Signs Range (Last 24H):       Physical Exam  Neurological:      Mental Status: She is alert and oriented to person, place, and time.             Assessment:      Surveillance colonoscopy for advanced sessile serrated colon polyp      Plan:      Surveillance colonoscopy for advanced sessile serrated colon polyp

## 2024-05-14 NOTE — TRANSFER OF CARE
"Anesthesia Transfer of Care Note    Patient: Zonia Skaggs    Procedure(s) Performed: Procedure(s) (LRB):  COLONOSCOPY (N/A)    Patient location: GI    Anesthesia Type: general    Transport from OR: Transported from OR on room air with adequate spontaneous ventilation    Post pain: adequate analgesia    Post assessment: no apparent anesthetic complications and tolerated procedure well    Post vital signs: stable    Level of consciousness: awake, alert and oriented    Nausea/Vomiting: no nausea/vomiting    Complications: none    Transfer of care protocol was followed    Last vitals: Visit Vitals  /81   Pulse 95   Temp 36.4 °C (97.6 °F) (Oral)   Resp 16   Ht 5' 5" (1.651 m)   Wt 122.5 kg (270 lb)   SpO2 98%   BMI 44.93 kg/m²     "

## 2024-05-14 NOTE — ANESTHESIA POSTPROCEDURE EVALUATION
Anesthesia Post Evaluation    Patient: Zonia Skaggs    Procedure(s) Performed: Procedure(s) (LRB):  COLONOSCOPY (N/A)    Final Anesthesia Type: general      Patient location during evaluation: PACU  Patient participation: Yes- Able to Participate  Level of consciousness: awake and alert and oriented  Post-procedure vital signs: reviewed and stable  Pain management: adequate  Airway patency: patent    PONV status at discharge: No PONV  Anesthetic complications: no      Cardiovascular status: blood pressure returned to baseline and hemodynamically stable  Respiratory status: unassisted  Hydration status: euvolemic  Follow-up not needed.              Vitals Value Taken Time   /88 05/14/24 0804   Temp 36.6 °C (97.9 °F) 05/14/24 0749   Pulse 89 05/14/24 0804   Resp 16 05/14/24 0804   SpO2 97 % 05/14/24 0804         No case tracking events are documented in the log.      Pain/Jovita Score: Jovita Score: 10 (5/14/2024  7:50 AM)

## 2024-05-14 NOTE — PROVATION PATIENT INSTRUCTIONS
Discharge Summary/Instructions after an Endoscopic Procedure  Patient Name: Zonia Skaggs  Patient MRN: [id number]  Patient YOB: 1975  Tuesday, May 14, 2024  Juan Antonio Mcclelland MD  Dear patient,  As a result of recent federal legislation (The Federal Cures Act), you may   receive lab or pathology results from your procedure in your [facility]   account before your physician is able to contact you. Your physician or   their representative will relay the results to you with their   recommendations at their soonest availability.  Thank you,  RESTRICTIONS:  During your procedure today, you received medications for sedation.  These   medications may affect your judgment, balance and coordination.  Therefore,   for 24 hours, you have the following restrictions:   - DO NOT drive a car, operate machinery, make legal/financial decisions,   sign important papers or drink alcohol.    ACTIVITY:  Today: no heavy lifting, straining or running due to procedural   sedation/anesthesia.  The following day: return to full activity including work.  DIET:  Eat and drink normally unless instructed otherwise.     TREATMENT FOR COMMON SIDE EFFECTS:  - Mild abdominal pain, nausea, belching, bloating or excessive gas:  rest,   eat lightly and use a heating pad.  - Sore Throat: treat with throat lozenges and/or gargle with warm salt   water.  - Because air was used during the procedure, expelling large amounts of air   from your rectum or belching is normal.  - If a bowel prep was taken, you may not have a bowel movement for 1-3 days.    This is normal.  SYMPTOMS TO WATCH FOR AND REPORT TO YOUR PHYSICIAN:  1. Abdominal pain or bloating, other than gas cramps.  2. Chest pain.  3. Back pain.  4. Signs of infection such as: chills or fever occurring within 24 hours   after the procedure.  5. Rectal bleeding, which would show as bright red, maroon, or black stools.   (A tablespoon of blood from the rectum is not serious, especially if    hemorrhoids are present.)  6. Vomiting.  7. Weakness or dizziness.  GO DIRECTLY TO THE NEAREST EMERGENCY ROOM IF YOU HAVE ANY OF THE FOLLOWING:      Difficulty breathing              Chills and/or fever over 101 F   Persistent vomiting and/or vomiting blood   Severe abdominal pain   Severe chest pain   Black, tarry stools   Bleeding- more than one tablespoon   Any other symptom or condition that you feel may need urgent attention  Your doctor recommends these additional instructions:  If any biopsies were taken, your doctors clinic will contact you in 1 to 2   weeks with any results.  - Discharge patient to home.   - Await pathology results.   - Telephone endoscopist for pathology results in 3 weeks.   - Repeat colonoscopy in 3 years for surveillance.   - For the next 2 weeks only - Consider avoiding all non-steroidal   anti-inflammatory drugs (Mobic, ibuprofen, naproxen, etc.), unless needed   for cardiovascular protection.  Recommend you discuss with your prescribing   doctor (of your aspirin) to see if cardiovascular benefits of your aspirin   outweigh the risks of GI bleeding. O.K. to take aspirin if needed for   cardiovascular protection.  - Return to GI clinic at the next available appointment.   - Return to primary care physician.   - The findings and recommendations were discussed with the patient.  For questions, problems or results please call your physician - Juan Antonio Mcclelland MD at Work:  [phone].  [facility], EMERGENCY ROOM PHONE NUMBER: [phone]  IF A COMPLICATION OR EMERGENCY SITUATION ARISES AND YOU ARE UNABLE TO REACH   YOUR PHYSICIAN - GO DIRECTLY TO THE EMERGENCY ROOM.  Juan Antonio Mcclelland MD  5/14/2024 7:47:14 AM  This report has been verified and signed electronically.  Dear patient,  As a result of recent federal legislation (The Federal Cures Act), you may   receive lab or pathology results from your procedure in your [facility]   account before your physician is able to  contact you. Your physician or   their representative will relay the results to you with their   recommendations at their soonest availability.  Thank you,  [employer]

## 2024-05-14 NOTE — ANESTHESIA PREPROCEDURE EVALUATION
05/14/2024  Zonia Skaggs is a 48 y.o., female.    Active Problem List with Overview Notes    Diagnosis Date Noted    Class 3 obesity 02/08/2024    Sleep-related breathing disorder 08/23/2022    Laceration of right eye 11/20/2019    Borderline diabetes 11/20/2019    Seasonal allergic rhinitis 11/20/2019    Gastroesophageal reflux disease without esophagitis 11/20/2019    Abnormal urinalysis 11/20/2019    Bipolar disorder, current episode manic severe with psychotic features 05/21/2019    Iron deficiency anemia 05/21/2019    Lower abdominal pain 05/21/2019    Boxer's fracture, closed, initial encounter 06/22/2018     Past Surgical History:   Procedure Laterality Date    COLONOSCOPY N/A 4/20/2023    Procedure: COLONOSCOPY;  Surgeon: Juan Antonio Mcclelland MD;  Location: [facility] ([address] [place] [address]);  Service: Endoscopy;  Laterality: N/A;  instr portal-GT  pre call no answer-as  confirmed earlier time 4/19 EB    ESOPHAGOGASTRODUODENOSCOPY N/A 1/6/2023    Procedure: ESOPHAGOGASTRODUODENOSCOPY (EGD);  Surgeon: Juan Antonio Mcclelland MD;  Location: [address]);  Service: Endoscopy;  Laterality: N/A;  instr portal-GT    FINGER FRACTURE SURGERY      REPAIR OF INCARCERATED VENTRAL HERNIA WITHOUT HISTORY OF PRIOR REPAIR N/A 7/23/2019    Procedure: REPAIR, HERNIA, VENTRAL, INCARCERATED, WITHOUT HISTORY OF PRIOR REPAIR;  Surgeon: Bandar Madden MD;  Location: [facility];  Service: General;  Laterality: N/A;     Results for orders placed or performed in visit on 09/08/21   IN OFFICE EKG 12-LEAD (to [place])    Collection Time: 09/08/21  3:03 PM    Narrative    Test Reason : R07.89,    Vent. Rate : 092 BPM     Atrial Rate : 092 BPM     P-R Int : 132 ms          QRS Dur : 078 ms      QT Int : 374 ms       P-R-T Axes : 065 037 048 degrees     QTc Int : 462 ms    Normal sinus rhythm  Normal ECG  When compared with ECG of  21-MAY-2019 10:29,  No significant change was found  Confirmed by Stuart Ortega MD ([id number]) on 9/9/2021 3:02:42 PM    Referred By:  KANDY           Confirmed By:Stuart Ortega MD       Pre-op Assessment    I have reviewed the Patient Summary Reports.    I have reviewed the NPO Status.   I have reviewed the Medications.     Review of Systems  Anesthesia Hx:  No problems with previous Anesthesia                Social:  Non-Smoker       Hematology/Oncology:  Hematology Normal   Oncology Normal                                   EENT/Dental:  EENT/Dental Normal           Cardiovascular:  Cardiovascular Normal                  ECG has been reviewed.                          Pulmonary:  Pulmonary Normal                       Renal/:  Renal/ Normal                 Hepatic/GI:     GERD             Musculoskeletal:  Musculoskeletal Normal                Neurological:  Neurology Normal                                      Endocrine:  Endocrine Normal          Morbid Obesity / BMI > 40  Dermatological:  Skin Normal    Psych:  Psychiatric History   Bipolar             Physical Exam  General: Well nourished, Cooperative, Alert and Oriented    Airway:  Mallampati: II   Mouth Opening: Normal  TM Distance: Normal  Tongue: Normal  Neck ROM: Normal ROM    Dental:  Intact    Chest/Lungs:  Normal Respiratory Rate    Anesthesia Plan  Type of Anesthesia, risks & benefits discussed:    Anesthesia Type: Gen Natural Airway  Intra-op Monitoring Plan: Standard ASA Monitors  Post Op Pain Control Plan: multimodal analgesia  Induction:  IV  Informed Consent: Informed consent signed with the Patient and all parties understand the risks and agree with anesthesia plan.  All questions answered.   ASA Score: 3  Day of Surgery Review of History & Physical: H&P Update referred to the surgeon/provider.    Ready For Surgery From Anesthesia Perspective.   .

## 2024-05-15 ENCOUNTER — OFFICE VISIT (OUTPATIENT)
Dept: PSYCHIATRY | Facility: CLINIC | Age: 49
End: 2024-05-15
Payer: COMMERCIAL

## 2024-05-15 DIAGNOSIS — F31.77 BIPOLAR I DISORDER, MOST RECENT EPISODE MIXED, IN PARTIAL REMISSION: Primary | ICD-10-CM

## 2024-05-15 PROCEDURE — 90837 PSYTX W PT 60 MINUTES: CPT | Mod: 95,,, | Performed by: SOCIAL WORKER

## 2024-05-15 NOTE — PROGRESS NOTES
"The patient location is: home  The chief complaint leading to consultation is: depression, anxiety    Visit type: audiovisual    Face to Face time with patient: 55 minutes  65 minutes of total time spent on the encounter, which includes face to face time and non-face to face time preparing to see the patient (eg, review of tests), Obtaining and/or reviewing separately obtained history, Documenting clinical information in the electronic or other health record, Independently interpreting results (not separately reported) and communicating results to the patient/family/caregiver, or Care coordination (not separately reported).     Each patient to whom he or she provides medical services by telemedicine is:  (1) informed of the relationship between the physician and patient and the respective role of any other health care provider with respect to management of the patient; and (2) notified that he or she may decline to receive medical services by telemedicine and may withdraw from such care at any time.    Notes:    Individual Psychotherapy (PhD/LCSW)    5/15/2024    Site:  Telemed         Therapeutic Intervention: Met with patient.  Outpatient - Insight oriented psychotherapy 60 min - CPT code 24475 and Outpatient - Supportive psychotherapy 60 min - CPT Code 21877    Chief complaint/reason for encounter: depression and anxiety     Interval history and content of current session:  Patient returns for psychotherapy. Patient reports that she is "well". States that she went for appointment yesterday and they didn't think polyps looked cancerous. States that things have been okay at the house. Feeling more motivated since she came back from [place]. Reports that mental health has been better. Daughter made a comment about wanting friends and they have been trying to get them out more often. Discussed different things she could look into to help with socializing children more.     Scheduled follow up for 6/19    Treatment " plan:  Target symptoms: depression, anxiety   Why chosen therapy is appropriate versus another modality: relevant to diagnosis, evidence based practice  Outcome monitoring methods: self-report, observation  Therapeutic intervention type: insight oriented psychotherapy, supportive psychotherapy    Risk parameters:  Patient reports no suicidal ideation  Patient reports no homicidal ideation  Patient reports no self-injurious behavior  Patient reports no violent behavior    Verbal deficits: None    Patient's response to intervention:  The patient's response to intervention is accepting.    Progress toward goals and other mental status changes:  The patient's progress toward goals is good.    Diagnosis:     ICD-10-CM ICD-9-CM   1. Bipolar I disorder, most recent episode mixed, in partial remission  F31.77 296.65       Plan:  individual psychotherapy and medication management by physician    Return to clinic: 1 month    Length of Service (minutes): 60    Cassandra Rockweiler, LCSW-LEONAS

## 2024-05-16 ENCOUNTER — PATIENT MESSAGE (OUTPATIENT)
Dept: ENDOSCOPY | Facility: HOSPITAL | Age: 49
End: 2024-05-16
Payer: COMMERCIAL

## 2024-05-16 LAB
FINAL PATHOLOGIC DIAGNOSIS: NORMAL
GROSS: NORMAL
Lab: NORMAL

## 2024-05-16 NOTE — PROGRESS NOTES
Zonia your colonoscopy pathology was benign and recommend your next surveillance colonoscopy in 3-years.      1. Descending colon, 4 mm polyp, biopsy:  Hyperplastic polyp.    2. Sigmoid colon, 5 mm polyp, biopsy:  Hyperplastic polyp.   Comment: Interp By Meg Lawrence M.D., Signed on 05/16/2024

## 2024-05-17 RX ORDER — BUPROPION HYDROCHLORIDE 150 MG/1
150 TABLET ORAL
Qty: 90 TABLET | Refills: 0 | Status: SHIPPED | OUTPATIENT
Start: 2024-05-17

## 2024-06-19 ENCOUNTER — OFFICE VISIT (OUTPATIENT)
Dept: PSYCHIATRY | Facility: CLINIC | Age: 49
End: 2024-06-19
Payer: COMMERCIAL

## 2024-06-19 DIAGNOSIS — F31.77 BIPOLAR I DISORDER, MOST RECENT EPISODE MIXED, IN PARTIAL REMISSION: Primary | ICD-10-CM

## 2024-06-19 PROCEDURE — 90785 PSYTX COMPLEX INTERACTIVE: CPT | Mod: 95,,, | Performed by: SOCIAL WORKER

## 2024-06-19 PROCEDURE — 90834 PSYTX W PT 45 MINUTES: CPT | Mod: 95,,, | Performed by: SOCIAL WORKER

## 2024-06-19 NOTE — PROGRESS NOTES
"The patient location is: home  The chief complaint leading to consultation is: depression, anxiety    Visit type: audiovisual    Face to Face time with patient: 55 minutes  65 minutes of total time spent on the encounter, which includes face to face time and non-face to face time preparing to see the patient (eg, review of tests), Obtaining and/or reviewing separately obtained history, Documenting clinical information in the electronic or other health record, Independently interpreting results (not separately reported) and communicating results to the patient/family/caregiver, or Care coordination (not separately reported).     Each patient to whom he or she provides medical services by telemedicine is:  (1) informed of the relationship between the physician and patient and the respective role of any other health care provider with respect to management of the patient; and (2) notified that he or she may decline to receive medical services by telemedicine and may withdraw from such care at any time.    Notes:    Individual Psychotherapy (PhD/LCSW)    6/19/2024    Site:  Telemed         Therapeutic Intervention: Met with patient.  Outpatient - Insight oriented psychotherapy 60 min - CPT code 12838 and Outpatient - Supportive psychotherapy 60 min - CPT Code 69404    Chief complaint/reason for encounter: depression and anxiety     Interval history and content of current session:  Patient returns for psychotherapy. Patient reports that she is "okay". Has been doing better lately. She has been taking medication during the day and has had more motivation during the day. States that she has been feeling better, not as many low days. Since last visit had to put her dog down because she was getting sick. Discussed history with dog and how she misses her at different times. Discussed that she has given the kids a couple extra weeks off of school. Will return to [facility] in July.     Scheduled follow up for " 7/18    Treatment plan:  Target symptoms: depression, anxiety   Why chosen therapy is appropriate versus another modality: relevant to diagnosis, evidence based practice  Outcome monitoring methods: self-report, observation  Therapeutic intervention type: insight oriented psychotherapy, supportive psychotherapy    Risk parameters:  Patient reports no suicidal ideation  Patient reports no homicidal ideation  Patient reports no self-injurious behavior  Patient reports no violent behavior    Verbal deficits: None    Patient's response to intervention:  The patient's response to intervention is accepting.    Progress toward goals and other mental status changes:  The patient's progress toward goals is good.    Diagnosis:     ICD-10-CM ICD-9-CM   1. Bipolar I disorder, most recent episode mixed, in partial remission  F31.77 296.65       Plan:  individual psychotherapy and medication management by physician    Return to clinic: 1 month    Length of Service (minutes): 60    Cassandra Rockweiler, LCSW-LEONAS

## 2024-06-28 ENCOUNTER — E-VISIT (OUTPATIENT)
Dept: FAMILY MEDICINE | Facility: CLINIC | Age: 49
End: 2024-06-28
Payer: COMMERCIAL

## 2024-06-28 DIAGNOSIS — K13.70 ORAL LESION: Primary | ICD-10-CM

## 2024-06-28 RX ORDER — CHLORHEXIDINE GLUCONATE ORAL RINSE 1.2 MG/ML
15 SOLUTION DENTAL 2 TIMES DAILY
Qty: 118 ML | Refills: 0 | Status: SHIPPED | OUTPATIENT
Start: 2024-06-28 | End: 2024-07-12

## 2024-06-28 NOTE — PROGRESS NOTES
Patient ID: Zonia Skaggs is a 48 y.o. female.    Chief Complaint: No chief complaint on file.    The patient initiated a request through [employer] on 6/28/2024 for evaluation and management with a chief complaint of No chief complaint on file.     I evaluated the questionnaire submission on 06/28/2024.    [facility] General    6/28/2024  9:04 AM CDT - Filed by Patient   Do you agree to participate in an E-Visit? Yes   If you have any of the following symptoms, please present to your local emergency room or call 911:  I acknowledge   Are you pregnant, could you be pregnant, or are you breast feeding? None of the above   What is the main issue you would like addressed today? I have some sort of growth or sore in my inner check of my mouth.   Please describe your symptoms It was a bite in my inner check and now its some type of growth   Where is your problem located? My inner check of my mouth on the left side   How severe are your symptoms? Moderate   Have you had these symptoms before? No   How long have you been having these symptoms? For one to four weeks   Please list any medications or treatments you have used for your condition and indicate if it was effective or not. Nothing   What makes this feel better? Not really sure, its uncomfortable   What makes this feel worse? Biting it by mistake, it bleeds   Are these symptoms related to a condition that you currently have? No   Please describe any probable cause for these symptoms I maybe bit my inner cheek by mistake and I am not sure what to do. Do I go to the dentist or doctor?   Provide any additional information you feel is important. I was able to break off it by accidentally biting my mouth but it grew back   Please attach any relevant images or files    Are you able to take your vital signs? No         No diagnosis found.     No orders of the defined types were placed in this encounter.           No follow-ups on file.      E-Visit Time Tracking:

## 2024-07-17 ENCOUNTER — OFFICE VISIT (OUTPATIENT)
Dept: OTOLARYNGOLOGY | Facility: CLINIC | Age: 49
End: 2024-07-17
Payer: COMMERCIAL

## 2024-07-17 VITALS
SYSTOLIC BLOOD PRESSURE: 154 MMHG | DIASTOLIC BLOOD PRESSURE: 81 MMHG | WEIGHT: 280.19 LBS | BODY MASS INDEX: 46.63 KG/M2 | HEART RATE: 101 BPM

## 2024-07-17 DIAGNOSIS — K13.70 ORAL LESION: ICD-10-CM

## 2024-07-17 DIAGNOSIS — K13.4: Primary | ICD-10-CM

## 2024-07-17 PROCEDURE — 99999 PR PBB SHADOW E&M-EST. PATIENT-LVL III: CPT | Mod: PBBFAC,,, | Performed by: PHYSICIAN ASSISTANT

## 2024-07-17 PROCEDURE — 88305 TISSUE EXAM BY PATHOLOGIST: CPT | Performed by: PATHOLOGY

## 2024-07-17 NOTE — PROGRESS NOTES
Chief Complaint   Patient presents with    Oral lesion inside of cheek         48 y.o. female presents with for lesion to the L inner cheek.  In April, she bit the side of her mouth and then developed a small lesion/growth there.  Since then, she has accidentally bit the area off and it will grow back larger than before. She is some tenderness to the area and bleeding with any trauma.  She is having difficulty eating and chewing due to location. She is not on any blood thinners. Denies sore throat, dysphagia, neck swelling.     No PSHx of the head or neck.   No history of radiation therapy.   Denies tobacco use.       Chart Review  6/28/24 - had an Evisit with her PCP for lesion to the L inner mouth. ENT referral placed.     Review of Systems     Answers submitted by the patient for this visit:  Review of Symptoms Questionnaire  (Submitted on 7/11/2024)  Fatigue (Tiredness)?: Yes  facial swelling: Yes  mouth sores: Yes  None of these : Yes  Sleep Apnea?: Yes  Foot swelling?: Yes  abdominal pain: Yes  heartburn: Yes  Acid Reflux?: Yes  Urinating too frequently?: Yes  None of these : Yes  Seasonal Allergies?: Yes  Hot all of the time? : Yes  Light-headedness: Yes  Bruises or bleeds easily: Yes  decreased concentration: Yes    HENT: +Lesion to the L inner cheek  Respiratory: Negative for shortness of breath  Cardiovascular: Negative for chest pain   Skin: Negative for rash.       Past Medical History:   Diagnosis Date    Bipolar 1 disorder     GERD (gastroesophageal reflux disease)     History of psychiatric hospitalization     Hx of psychiatric care     Psychiatric problem     Seasonal allergies     Therapy     used to follow with Dr. Gerber Rogers    Vaginal delivery     x5       Past Surgical History:   Procedure Laterality Date    COLONOSCOPY N/A 4/20/2023    Procedure: COLONOSCOPY;  Surgeon: Juan Antonio Mcclelland MD;  Location: [facility] ([address] [place] [address]);  Service: Endoscopy;  Laterality: N/A;  instr portal-GT  pre call no  answer-as  confirmed earlier time 4/19 EB    COLONOSCOPY N/A 5/14/2024    Procedure: COLONOSCOPY;  Surgeon: Juan Antonio Mcclelland MD;  Location: [facility] (4TH FLR);  Service: Endoscopy;  Laterality: N/A;  11/21 patient emanuel,new instr sent via portal patient has prep already.AC  9/8- referred by Dr. Mcclelland/ Prep instructions to portal. AS  3/6 r/s, unopened Suprep; updated instructions to portal-  5/13-updated instructions [facility], precall complete, pt notifi    ESOPHAGOGASTRODUODENOSCOPY N/A 1/6/2023    Procedure: ESOPHAGOGASTRODUODENOSCOPY (EGD);  Surgeon: Juan Antonio Mcclelland MD;  Location: [facility] (4TH FLR);  Service: Endoscopy;  Laterality: N/A;  instr portal-GT    FINGER FRACTURE SURGERY      REPAIR OF INCARCERATED VENTRAL HERNIA WITHOUT HISTORY OF PRIOR REPAIR N/A 7/23/2019    Procedure: REPAIR, HERNIA, VENTRAL, INCARCERATED, WITHOUT HISTORY OF PRIOR REPAIR;  Surgeon: Bandar Madden MD;  Location: [facility] OR;  Service: General;  Laterality: N/A;       family history includes Bipolar disorder in her maternal aunt and paternal aunt; Breast cancer in an other family member; Diabetes in her mother and another family member; Diabetes type II in her mother; Hyperlipidemia in her mother.    Pt  reports that she has never smoked. She has never used smokeless tobacco. She reports current alcohol use. She reports that she does not use drugs.    Review of patient's allergies indicates:   Allergen Reactions    Penicillins Hives and Shortness Of Breath    Morphine         Physical Exam    Vitals:    07/17/24 1354   BP: (!) 154/81   Pulse: 101     Body mass index is 46.63 kg/m².    General: AOx3, NAD  Right Ear: External Auditory Canal WNL,TM w/o masses/lesions/perforations  Left Ear:  External Auditory Canal WNL,TM w/o masses/lesions/perforations  Nose: No gross nasal septal deviation.  Inferior Turbinates WNL bilaterally.  No septal perforation.  No masses/lesions.  Oral Cavity: FOM Soft, no masses palpated.   Oral Tongue mobile.  Hard Palate WNL. There is a lesion/mass to the L inner cheek, consistent with pyogenic granuloma.  There is no bleeding, drainage, erythema to the area.   Oropharynx: BOT WNL.  No masses/lesions noted.  Tonsillar fossa without lesions.  Soft palate without masses.  Midline uvula.  Neck: No palpable lymphadenopathy at I - VI.    Face: House Brackmann I bilaterally.  Eyes: Normal extra ocular motion bilaterally.      PROCEDURE:  Performed by Dr Cervantes  3cc lidocaine with epinephrine injected to the L inner cheek. After adequate anesthesia achieved, lesion was removed with 15 blade scalpel. Hemostasis achieved with direct pressure and silver nitrate. Specimen collected and sent to pathology for evaluation. Patient tolerated well.     Assessment     1. Pyogenic granuloma of oral mucosa    2. Oral lesion          Plan    Problem List Items Addressed This Visit    None  Visit Diagnoses       Pyogenic granuloma of oral mucosa    -  Primary    Relevant Orders    Specimen to Pathology ENT    Oral lesion              Likely pyogenic granuloma to the L inner check.  Lesion was excised by Dr Cervantes and sent to pathology for evaluation. Tolerated well.

## 2024-07-23 LAB
FINAL PATHOLOGIC DIAGNOSIS: NORMAL
GROSS: NORMAL
Lab: NORMAL
MICROSCOPIC EXAM: NORMAL

## 2024-07-31 ENCOUNTER — OFFICE VISIT (OUTPATIENT)
Dept: PSYCHIATRY | Facility: CLINIC | Age: 49
End: 2024-07-31
Payer: COMMERCIAL

## 2024-07-31 DIAGNOSIS — F31.77 BIPOLAR I DISORDER, MOST RECENT EPISODE MIXED, IN PARTIAL REMISSION: Primary | ICD-10-CM

## 2024-07-31 PROCEDURE — 90785 PSYTX COMPLEX INTERACTIVE: CPT | Mod: 95,,, | Performed by: SOCIAL WORKER

## 2024-07-31 PROCEDURE — 90837 PSYTX W PT 60 MINUTES: CPT | Mod: 95,,, | Performed by: SOCIAL WORKER

## 2024-07-31 NOTE — PROGRESS NOTES
"The patient location is: home  The chief complaint leading to consultation is: depression, anxiety    Visit type: audiovisual    Face to Face time with patient: 55 minutes  65 minutes of total time spent on the encounter, which includes face to face time and non-face to face time preparing to see the patient (eg, review of tests), Obtaining and/or reviewing separately obtained history, Documenting clinical information in the electronic or other health record, Independently interpreting results (not separately reported) and communicating results to the patient/family/caregiver, or Care coordination (not separately reported).     Each patient to whom he or she provides medical services by telemedicine is:  (1) informed of the relationship between the physician and patient and the respective role of any other health care provider with respect to management of the patient; and (2) notified that he or she may decline to receive medical services by telemedicine and may withdraw from such care at any time.    Notes:    Individual Psychotherapy (PhD/LCSW)    7/31/2024    Site:  Telemed         Therapeutic Intervention: Met with patient.  Outpatient - Insight oriented psychotherapy 60 min - CPT code 61836 and Outpatient - Supportive psychotherapy 60 min - CPT Code 05830    Chief complaint/reason for encounter: depression and anxiety     Interval history and content of current session:  Patient returns for psychotherapy. Patient reports that she is "alright". Had a growth in her mouth removed and that is why she had to cancel last appointment. Feels like she still needs a push. Has been sleeping later and doesn't have a routine. Son was promoted to 9th grade and they didn't think that he would be moving on to the next grade level. They also got good news for 's status for visa. There is a lot more work to be done but it was a good first step.  now has to do the next couple of steps because they are dependent on " the specific person. Discussed that she has brought up routine and habit issues. Discussed different ways to insert habits into her regular routine.     Scheduled follow up for 8/22    Treatment plan:  Target symptoms: depression, anxiety   Why chosen therapy is appropriate versus another modality: relevant to diagnosis, evidence based practice  Outcome monitoring methods: self-report, observation  Therapeutic intervention type: insight oriented psychotherapy, supportive psychotherapy    Risk parameters:  Patient reports no suicidal ideation  Patient reports no homicidal ideation  Patient reports no self-injurious behavior  Patient reports no violent behavior    Verbal deficits: None    Patient's response to intervention:  The patient's response to intervention is accepting.    Progress toward goals and other mental status changes:  The patient's progress toward goals is good.    Diagnosis:     ICD-10-CM ICD-9-CM   1. Bipolar I disorder, most recent episode mixed, in partial remission  F31.77 296.65         Plan:  individual psychotherapy and medication management by physician    Return to clinic: 1 month    Length of Service (minutes): 60    Cassandra Rockweiler, [facility]

## 2024-08-01 ENCOUNTER — PATIENT OUTREACH (OUTPATIENT)
Dept: ADMINISTRATIVE | Facility: HOSPITAL | Age: 49
End: 2024-08-01
Payer: COMMERCIAL

## 2024-08-01 DIAGNOSIS — Z12.31 BREAST CANCER SCREENING BY MAMMOGRAM: Primary | ICD-10-CM

## 2024-08-01 NOTE — PROGRESS NOTES
Annual exam scheduled for 11/01/24. Declined to schedule mammo at this time.Gap report updated. Immunization's updated/triggered.

## 2024-08-19 RX ORDER — BUPROPION HYDROCHLORIDE 150 MG/1
150 TABLET ORAL DAILY
Qty: 90 TABLET | Refills: 0 | Status: SHIPPED | OUTPATIENT
Start: 2024-08-19

## 2024-08-22 ENCOUNTER — OFFICE VISIT (OUTPATIENT)
Dept: PSYCHIATRY | Facility: CLINIC | Age: 49
End: 2024-08-22
Payer: COMMERCIAL

## 2024-08-22 DIAGNOSIS — F31.77 BIPOLAR I DISORDER, MOST RECENT EPISODE MIXED, IN PARTIAL REMISSION: Primary | ICD-10-CM

## 2024-08-22 PROCEDURE — 90837 PSYTX W PT 60 MINUTES: CPT | Mod: 95,,, | Performed by: SOCIAL WORKER

## 2024-08-22 PROCEDURE — 90785 PSYTX COMPLEX INTERACTIVE: CPT | Mod: 95,,, | Performed by: SOCIAL WORKER

## 2024-08-22 NOTE — PROGRESS NOTES
"The patient location is: home  The chief complaint leading to consultation is: depression, anxiety    Visit type: audiovisual    Face to Face time with patient: 55 minutes  65 minutes of total time spent on the encounter, which includes face to face time and non-face to face time preparing to see the patient (eg, review of tests), Obtaining and/or reviewing separately obtained history, Documenting clinical information in the electronic or other health record, Independently interpreting results (not separately reported) and communicating results to the patient/family/caregiver, or Care coordination (not separately reported).     Each patient to whom he or she provides medical services by telemedicine is:  (1) informed of the relationship between the physician and patient and the respective role of any other health care provider with respect to management of the patient; and (2) notified that he or she may decline to receive medical services by telemedicine and may withdraw from such care at any time.    Notes:    Individual Psychotherapy (PhD/LCSW)    8/22/2024    Site:  Telemed         Therapeutic Intervention: Met with patient.  Outpatient - Insight oriented psychotherapy 60 min - CPT code 08288 and Outpatient - Supportive psychotherapy 60 min - CPT Code 48044    Chief complaint/reason for encounter: depression and anxiety     Interval history and content of current session:  Patient returns for psychotherapy. Patient reports that she is "busy". Started program to speed up metabolism. Started shakeology and is currently doing no carbs, no sugar, and a couple of different things to help her lose weight. Has to be on this diet for 7-8 days, is on day 4. Has used the treadmill a couple times since last visit. Discussed the problem with long term diets and how to work on setting up healthy eating behaviors. Discussed getting closer to getting 's visa. Is excited for him to get the paperwork so they can go visit. "     Scheduled follow up for 9/19    Treatment plan:  Target symptoms: depression, anxiety   Why chosen therapy is appropriate versus another modality: relevant to diagnosis, evidence based practice  Outcome monitoring methods: self-report, observation  Therapeutic intervention type: insight oriented psychotherapy, supportive psychotherapy    Risk parameters:  Patient reports no suicidal ideation  Patient reports no homicidal ideation  Patient reports no self-injurious behavior  Patient reports no violent behavior    Verbal deficits: None    Patient's response to intervention:  The patient's response to intervention is accepting.    Progress toward goals and other mental status changes:  The patient's progress toward goals is good.    Diagnosis:     ICD-10-CM ICD-9-CM   1. Bipolar I disorder, most recent episode mixed, in partial remission  F31.77 296.65     Plan:  individual psychotherapy and medication management by physician    Return to clinic: 1 month    Length of Service (minutes): 60    Cassandra Rockweiler, LCSW-NEDA

## 2024-08-28 ENCOUNTER — OFFICE VISIT (OUTPATIENT)
Dept: PSYCHIATRY | Facility: CLINIC | Age: 49
End: 2024-08-28
Payer: COMMERCIAL

## 2024-08-28 VITALS
BODY MASS INDEX: 45.49 KG/M2 | SYSTOLIC BLOOD PRESSURE: 127 MMHG | DIASTOLIC BLOOD PRESSURE: 80 MMHG | OXYGEN SATURATION: 94 % | WEIGHT: 273.38 LBS | HEART RATE: 105 BPM

## 2024-08-28 DIAGNOSIS — F31.77 BIPOLAR I DISORDER, MOST RECENT EPISODE MIXED, IN PARTIAL REMISSION: Primary | ICD-10-CM

## 2024-08-28 DIAGNOSIS — E66.01 CLASS 3 OBESITY: ICD-10-CM

## 2024-08-28 PROCEDURE — 3079F DIAST BP 80-89 MM HG: CPT | Mod: CPTII,S$GLB,,

## 2024-08-28 PROCEDURE — 99999 PR PBB SHADOW E&M-EST. PATIENT-LVL III: CPT | Mod: PBBFAC,,,

## 2024-08-28 PROCEDURE — 99214 OFFICE O/P EST MOD 30 MIN: CPT | Mod: S$GLB,,,

## 2024-08-28 PROCEDURE — 3008F BODY MASS INDEX DOCD: CPT | Mod: CPTII,S$GLB,,

## 2024-08-28 PROCEDURE — 1160F RVW MEDS BY RX/DR IN RCRD: CPT | Mod: CPTII,S$GLB,,

## 2024-08-28 PROCEDURE — 1159F MED LIST DOCD IN RCRD: CPT | Mod: CPTII,S$GLB,,

## 2024-08-28 PROCEDURE — 3074F SYST BP LT 130 MM HG: CPT | Mod: CPTII,S$GLB,,

## 2024-08-28 PROCEDURE — G2211 COMPLEX E/M VISIT ADD ON: HCPCS | Mod: S$GLB,,,

## 2024-08-28 RX ORDER — BUPROPION HYDROCHLORIDE 150 MG/1
150 TABLET ORAL DAILY
Qty: 90 TABLET | Refills: 2 | Status: SHIPPED | OUTPATIENT
Start: 2024-08-28

## 2024-08-28 RX ORDER — CARIPRAZINE 1.5 MG/1
1.5 CAPSULE, GELATIN COATED ORAL DAILY
Qty: 90 CAPSULE | Refills: 2 | Status: SHIPPED | OUTPATIENT
Start: 2024-08-28 | End: 2025-05-25
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
## 2024-09-19 ENCOUNTER — OFFICE VISIT (OUTPATIENT)
Dept: PSYCHIATRY | Facility: CLINIC | Age: 49
End: 2024-09-19
Payer: COMMERCIAL

## 2024-09-19 DIAGNOSIS — F31.77 BIPOLAR I DISORDER, MOST RECENT EPISODE MIXED, IN PARTIAL REMISSION: Primary | ICD-10-CM

## 2024-09-19 NOTE — PROGRESS NOTES
"The patient location is: home  The chief complaint leading to consultation is: depression, anxiety    Visit type: audiovisual    Face to Face time with patient: 55 minutes  65 minutes of total time spent on the encounter, which includes face to face time and non-face to face time preparing to see the patient (eg, review of tests), Obtaining and/or reviewing separately obtained history, Documenting clinical information in the electronic or other health record, Independently interpreting results (not separately reported) and communicating results to the patient/family/caregiver, or Care coordination (not separately reported).     Each patient to whom he or she provides medical services by telemedicine is:  (1) informed of the relationship between the physician and patient and the respective role of any other health care provider with respect to management of the patient; and (2) notified that he or she may decline to receive medical services by telemedicine and may withdraw from such care at any time.    Notes:    Individual Psychotherapy (PhD/LCSW)    9/19/2024    Site:  Telemed         Therapeutic Intervention: Met with patient.  Outpatient - Insight oriented psychotherapy 60 min - CPT code 85098 and Outpatient - Supportive psychotherapy 60 min - CPT Code 05370    Chief complaint/reason for encounter: depression and anxiety     Interval history and content of current session:  Patient returns for psychotherapy. Patient reports that she is "alright". States that things have been up and down. Is getting off medication and will start to get depressed. States that she is in remission and wanted to get off of medication. Is still trying to work on her weight. 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 Discussed setting limits for son and the issues that they have had with him sneaking their devices outside of their room and staying up late. Discussed diet and how to set up a good " healthy nutrition schedule.     Scheduled follow up for 10/24    Treatment plan:  Target symptoms: depression, anxiety   Why chosen therapy is appropriate versus another modality: relevant to diagnosis, evidence based practice  Outcome monitoring methods: self-report, observation  Therapeutic intervention type: insight oriented psychotherapy, supportive psychotherapy    Risk parameters:  Patient reports no suicidal ideation  Patient reports no homicidal ideation  Patient reports no self-injurious behavior  Patient reports no violent behavior    Verbal deficits: None    Patient's response to intervention:  The patient's response to intervention is accepting.    Progress toward goals and other mental status changes:  The patient's progress toward goals is good.    Diagnosis:     ICD-10-CM ICD-9-CM   1. Bipolar I disorder, most recent episode mixed, in partial remission  F31.77 296.65     Plan:  individual psychotherapy and medication management by physician    Return to clinic: 1 month    Length of Service (minutes): 60    Cassandra Rockweiler, LCSW-NEDA

## 2024-09-24 ENCOUNTER — PATIENT OUTREACH (OUTPATIENT)
Dept: ADMINISTRATIVE | Facility: HOSPITAL | Age: 49
End: 2024-09-24
Payer: COMMERCIAL

## 2024-09-24 ENCOUNTER — TELEPHONE (OUTPATIENT)
Dept: FAMILY MEDICINE | Facility: CLINIC | Age: 49
End: 2024-09-24
Payer: COMMERCIAL

## 2024-09-24 DIAGNOSIS — Z00.00 ROUTINE GENERAL MEDICAL EXAMINATION AT A HEALTH CARE FACILITY: Primary | ICD-10-CM

## 2024-09-24 NOTE — TELEPHONE ENCOUNTER
----- Message from Elaine Tiwari MA sent at 9/24/2024 12:29 PM CDT -----  Regarding: Labs  Hi Dr. Reynoso    Pt is coming in for annual exam on 11/01/24. Are there any labs you would like to order before the visit?    [place]

## 2024-10-10 ENCOUNTER — E-VISIT (OUTPATIENT)
Dept: FAMILY MEDICINE | Facility: CLINIC | Age: 49
End: 2024-10-10
Payer: COMMERCIAL

## 2024-10-10 DIAGNOSIS — M25.50 ARTHRALGIA, UNSPECIFIED JOINT: ICD-10-CM

## 2024-10-10 DIAGNOSIS — R52 BODY ACHES: Primary | ICD-10-CM

## 2024-10-10 RX ORDER — CELECOXIB 200 MG/1
200 CAPSULE ORAL 2 TIMES DAILY
Qty: 60 CAPSULE | Refills: 0 | Status: SHIPPED | OUTPATIENT
Start: 2024-10-10 | End: 2024-11-09

## 2024-10-10 NOTE — PROGRESS NOTES
Patient ID: Zonia Skaggs is a 48 y.o. female.    Chief Complaint: General Illness (Entered automatically based on patient selection in [employer].)    The patient initiated a request through [employer] on 10/10/2024 for evaluation and management with a chief complaint of General Illness (Entered automatically based on patient selection in [employer].)     I evaluated the questionnaire submission on 10/10/2024.    [facility] E[facility]-Muscle,Back,Joint    10/10/2024  9:58 AM CDT - Filed by Patient   What do you need help with? Other Concern   Do you agree to participate in an E-Visit? Yes   If you have any of the following symptoms, please present to your local emergency room or call 911:  I acknowledge   Are you pregnant, could you be pregnant, or are you breast feeding? None of the above   What is the main issue you would like addressed today? Severe pain throughput body   Please describe your symptoms Strong pains throughout my body, similar to arthritis   Where is your problem located? Lower back pains, legs, arms & mostly throughout my body when walking   How severe are your symptoms? Severe   Have you had these symptoms before? No   How long have you been having these symptoms? For a few days   Please list any medications or treatments you have used for your condition and indicate if it was effective or not. Pain reliever   What makes this feel better? Lying down on my side   What makes this feel worse? Walking around, standing too long   Are these symptoms related to a condition that you currently have? Yes   What is the condition? Arthritis   When were you last seen for this condition?    Please describe any probable cause for these symptoms Possibly weight loss than gaining again   Provide any additional information you feel is important. The pain is also in both knees   Please attach any relevant images or files    Are you able to take your vital signs? No         Encounter Diagnoses   Name Primary?    Body  aches Yes    Arthralgia, unspecified joint         No orders of the defined types were placed in this encounter.     Medications Ordered This Encounter   Medications    celecoxib (CELEBREX) 200 MG capsule     Sig: Take 1 capsule (200 mg total) by mouth 2 (two) times daily.     Dispense:  60 capsule     Refill:  0        No follow-ups on file.      E-Visit Time Tracking:    Day 1 Time (in minutes): 5    Total Time (in minutes): 5

## 2024-10-16 ENCOUNTER — OFFICE VISIT (OUTPATIENT)
Dept: FAMILY MEDICINE | Facility: CLINIC | Age: 49
End: 2024-10-16
Payer: COMMERCIAL

## 2024-10-16 VITALS
HEIGHT: 65 IN | WEIGHT: 281.75 LBS | HEART RATE: 107 BPM | SYSTOLIC BLOOD PRESSURE: 122 MMHG | TEMPERATURE: 98 F | DIASTOLIC BLOOD PRESSURE: 62 MMHG | BODY MASS INDEX: 46.94 KG/M2 | OXYGEN SATURATION: 96 %

## 2024-10-16 DIAGNOSIS — D64.9 ANEMIA, UNSPECIFIED TYPE: ICD-10-CM

## 2024-10-16 DIAGNOSIS — R73.03 BORDERLINE DIABETES: ICD-10-CM

## 2024-10-16 DIAGNOSIS — M79.10 MUSCLE PAIN: ICD-10-CM

## 2024-10-16 DIAGNOSIS — H93.19 TINNITUS, UNSPECIFIED LATERALITY: ICD-10-CM

## 2024-10-16 DIAGNOSIS — H91.90 HEARING LOSS, UNSPECIFIED HEARING LOSS TYPE, UNSPECIFIED LATERALITY: ICD-10-CM

## 2024-10-16 DIAGNOSIS — D50.9 IRON DEFICIENCY ANEMIA, UNSPECIFIED IRON DEFICIENCY ANEMIA TYPE: ICD-10-CM

## 2024-10-16 DIAGNOSIS — R11.0 NAUSEA: ICD-10-CM

## 2024-10-16 DIAGNOSIS — F31.2 BIPOLAR DISORDER, CURRENT EPISODE MANIC SEVERE WITH PSYCHOTIC FEATURES: ICD-10-CM

## 2024-10-16 DIAGNOSIS — R42 VERTIGO: Primary | ICD-10-CM

## 2024-10-16 PROCEDURE — 99999 PR PBB SHADOW E&M-EST. PATIENT-LVL IV: CPT | Mod: PBBFAC,,, | Performed by: INTERNAL MEDICINE

## 2024-10-16 PROCEDURE — 99214 OFFICE O/P EST MOD 30 MIN: CPT | Mod: S$GLB,,, | Performed by: INTERNAL MEDICINE

## 2024-10-16 PROCEDURE — 3008F BODY MASS INDEX DOCD: CPT | Mod: CPTII,S$GLB,, | Performed by: INTERNAL MEDICINE

## 2024-10-16 PROCEDURE — 3074F SYST BP LT 130 MM HG: CPT | Mod: CPTII,S$GLB,, | Performed by: INTERNAL MEDICINE

## 2024-10-16 PROCEDURE — 3078F DIAST BP <80 MM HG: CPT | Mod: CPTII,S$GLB,, | Performed by: INTERNAL MEDICINE

## 2024-10-16 PROCEDURE — 1159F MED LIST DOCD IN RCRD: CPT | Mod: CPTII,S$GLB,, | Performed by: INTERNAL MEDICINE

## 2024-10-16 RX ORDER — MECLIZINE HYDROCHLORIDE 25 MG/1
25 TABLET ORAL 3 TIMES DAILY PRN
Qty: 60 TABLET | Refills: 12 | Status: SHIPPED | OUTPATIENT
Start: 2024-10-16

## 2024-10-16 NOTE — PROGRESS NOTES
Chief complaint: Dizzy    Patient is a 48-year-old white female new to me who made her appointment in cancellation.  For two weeks she has been aching all over.  She says she has osteoarthritis and she does have a lot of low back pain so she had five kids and some knee pain but she is really achy all over.  Her son apparently had a viral infection and she herself really did not have much upper respiratory symptoms but had some.  Sounds like she was given Celebrex 200 mg twice a day about a week ago so she was attributing the onset of dizziness to the medicine.  She had some ringing in her left ear about two days ago but that was very momentary.  She has had perceived decreased hearing in both ears for about a week.  Whenever she goes to get up or bend over and comes up she gets dizzy off-balance feeling.  She does not get it when she lays down or turns to the side.  No blurry vision.  She has been again aching all over for two weeks.  No previous diagnosis of fibromyalgia and if achiness continues she could consider that but this sounds more of an acute aching all over superimposed on her probable arthritis.    Patient does get motion sickness when she flies and years ago she was getting less drowsy Dramamine and that is seems to help and I advise her that probably is meclizine.    When I laid the patient down on the exam table and sat her up she did get her momentary dizziness descriptive of vertigo and only slight twitching of the eyes not quite sure I would call it nystagmus    Records in prior testing reviewed.    Impression: CT   Postsurgical changes of anterior abdominal wall hernia repair.  No evidence for hernia recurrence.   Mild hepatomegaly with diffusely decreased hepatic density suggestive of fatty changes of the liver.   Left renal cyst.   Electronically signed by:Antonio Correa MD  Date:                                            11/30/2023      Review of systems: No other ENT symptoms, no vision  changes, no focal neurological deficits, no rashes     Past Medical History:   Diagnosis Date    Bipolar 1 disorder     Borderline diabetes 11/20/2019    Boxer's fracture, closed, initial encounter 06/22/2018    Gastroesophageal reflux disease without esophagitis 11/20/2019    GERD (gastroesophageal reflux disease)     History of psychiatric hospitalization     Iron deficiency anemia 05/21/2019    Seasonal allergic rhinitis 11/20/2019    Seasonal allergies     Sleep-related breathing disorder 08/23/2022    Therapy     used to follow with Dr. Gerber Rogers    Vaginal delivery     x5     Gen: no distress  EYES: conjunctiva clear, non-icteric, PERRL, ear canals are clear and tympanic membranes pearly  ENT: nose clear, nasal mucosa normal, oropharynx clear and moist, teeth good  NECK:supple, thyroid non-palpable  RESP: effort is good, lungs clear  CV: heart RRR w/o murmur, gallops or rubs; no carotid bruits, no edema  GI: abdomen soft, non-distended, non-tender, no hepatosplenomegaly  MS: gait normal, no clubbing or cyanosis of the digits, patient does perceive achiness when I would touch her in any part of her body over the muscles especially her lower paraspinals  SKIN: no rashes, warm to touch      Diagnoses and all orders for this visit:    Vertigo, fairly classic for vertigo and she may have had a URI in the last two weeks although mild she did have some slight tinnitus and she has perceived some new hearing loss for about a week.  I do not think it is bad enough to treat with steroids and she is probably far to into it to benefit from steroids regarding mild labyrinthitis and she does have prediabetes and has not had labs in a year so I would hesitate to make her glucose worse.  Will suppress with meclizine at least one at night discussing it may take a few weeks to go away.  Given the above symptoms will also have her see and be evaluated by ENT and have hearing testing and so forth.    Nausea, mild and secondary  and associated with the vertigo symptoms    Bipolar disorder, current episode manic severe with psychotic features, may contribute to perception of issues above, keep follow-up with psychiatry    Anemia, unspecified type, slight hemoglobin reduction a year ago, no symptoms of anemia and she does not appear to be anemic explaining her dizziness    Borderline diabetes, probably needs to follow up with PCP to reassess in the near future    Iron deficiency anemia, unspecified iron deficiency anemia type, needs to be reassess with next blood work    Hearing loss, unspecified hearing loss type, unspecified laterality, ear canals are clear and appears to be associated with the above issues    Tinnitus, unspecified laterality  -     Ambulatory referral/consult to ENT; Future    Muscle pain, aching all over for two weeks appears to be acute and may well be related to a viral in infection.  Would not be surprised if she has some chronic lumbar strain and some knee arthritis although not had any knee imaging.  She does have some joint pains chronically but this appears to be more myalgias and hopefully is something acute.  Otherwise I would think she would have something like fibromyalgia and if they persist she might need rheumatology that formal diagnosis    Other orders  -     meclizine (ANTIVERT) 25 mg tablet; Take 1 tablet (25 mg total) by mouth 3 (three) times daily as needed for Dizziness (or at least one HS for 1 week when vertigo active).

## 2024-10-23 ENCOUNTER — CLINICAL SUPPORT (OUTPATIENT)
Dept: AUDIOLOGY | Facility: CLINIC | Age: 49
End: 2024-10-23
Payer: COMMERCIAL

## 2024-10-23 ENCOUNTER — OFFICE VISIT (OUTPATIENT)
Dept: OTOLARYNGOLOGY | Facility: CLINIC | Age: 49
End: 2024-10-23
Payer: COMMERCIAL

## 2024-10-23 VITALS — HEART RATE: 104 BPM | DIASTOLIC BLOOD PRESSURE: 86 MMHG | SYSTOLIC BLOOD PRESSURE: 131 MMHG

## 2024-10-23 DIAGNOSIS — H92.03 EAR PAIN, BILATERAL: ICD-10-CM

## 2024-10-23 DIAGNOSIS — H81.20 VESTIBULAR NEURONITIS, UNSPECIFIED LATERALITY: Primary | ICD-10-CM

## 2024-10-23 DIAGNOSIS — E66.813 CLASS 3 OBESITY: ICD-10-CM

## 2024-10-23 DIAGNOSIS — H53.2 DIPLOPIA: ICD-10-CM

## 2024-10-23 DIAGNOSIS — H93.13 TINNITUS OF BOTH EARS: ICD-10-CM

## 2024-10-23 DIAGNOSIS — H93.8X3 EAR FULLNESS, BILATERAL: ICD-10-CM

## 2024-10-23 DIAGNOSIS — R42 DIZZINESS: Primary | ICD-10-CM

## 2024-10-23 PROCEDURE — 92567 TYMPANOMETRY: CPT | Mod: S$GLB,,,

## 2024-10-23 PROCEDURE — 99999 PR PBB SHADOW E&M-EST. PATIENT-LVL IV: CPT | Mod: PBBFAC,,, | Performed by: OTOLARYNGOLOGY

## 2024-10-23 PROCEDURE — 92557 COMPREHENSIVE HEARING TEST: CPT | Mod: S$GLB,,,

## 2024-10-23 PROCEDURE — 99999 PR PBB SHADOW E&M-EST. PATIENT-LVL I: CPT | Mod: PBBFAC,,,

## 2024-10-23 NOTE — PROGRESS NOTES
49-year-old female referred by Dr. Naresh Merino for evaluation of tinnitus and vertigo.  Patient reports that about 2 weeks ago she started with trouble with bone and joint pain all over body.  She was having body aches.  She discussed with 1 of her physicians and was given Celebrex.  She soon thereafter felt dizzy and off balance.  She is having sensations of movement when she moves 1 side of the other or gets up.  She also feels like her concentration is not normal.  She just does not feel herself.  She almost has felt like she has double vision at time or has come trouble concentrating.  She has had some ear ringing but it was momentary.  She noticed it more on the left side.  She has also noticed some postnasal drip.  Looking back through the records she did admit to a viral sick contact with 1 of her children.  She did not have a lot of upper respiratory symptoms at the time.  She has not noticed a significant change in her hearing but some ear fullness.  She has had some change in her vision in the last few months in his now needing reading glasses.  She has was near sighted previous [place] since she was young.  She did have a switch in the time of day she is taking 1 of her medications from the evening to the daytime after stopping Topamax over a month ago.  She was not told to decrease the dosage on that that it was okay to stop.  Her psychiatrist who is taking care of her bipolar recently went into a different practice so she does have a new psychiatrist.  She has been fairly well controlled in the past few years however she has gained a significant amount of weight.  A few months ago she was on a low carb diet and had lost about 10 lb.  More recently she has gained that back.  She is anxious to get active again and work on her weight management.  Her mother is with her today and reports that a number of family members have had vertigo which they are not certain of the exact cause of each individual  vertigo patient.      No ear surgery, ear trauma, head trauma, ear drainage, unilateral tinnitus, unilateral ear pressure, acute hearing loss associated with episodes of dizziness.  No known family hx of early hearing loss or ear problems.    PMHx, PSHx, Meds, Allergies, SocHx, FamHx reviewed in Epic and updated appropriate to this visit    Review of Systems     Constitutional: Positive for fatigue.  Negative for fever.      HENT: Positive for ear pain, mouth sores, postnasal drip and sinus pressure.  Negative for ear discharge, hearing loss, runny nose, sinus infection, stuffy nose, trouble swallowing and voice change.      Eyes:  Positive for photophobia. Negative for eye drainage and eye itching.     Respiratory:  Positive for cough, sleep apnea and snoring. Negative for shortness of breath and wheezing.      Cardiovascular:  Positive for foot swelling. Negative for chest pain.     Gastrointestinal:  Positive for acid reflux and heartburn. Negative for abdominal pain.     Genitourinary: Positive for frequent urination. Negative for difficulty urinating.     Musc: Positive for aching muscles and back pain. Negative for aching joints.     Skin: Negative for rash.     Allergy: Positive for seasonal allergies.     Endocrine: Positive for heat intolerance.     Neurological: Positive for dizziness and light-headedness. Negative for headaches.      Hematologic: Negative for bruises/bleeds easily and swollen glands.      Psychiatric: Positive for decreased concentration. Negative for depression and nervous/anxious.            PE: /86 (BP Location: Left arm, Patient Position: Sitting)   Pulse 104   Gen: female, WN, WD, NAD, cooperative and alert, good historian  Eyes: no spontaneous nystagmus, PERRL  Ears: no cerumen with translucent TMs bilaterally, normal bony landmarks  Nose: external wnl, septum near midline, turbinates WNL, clear drainage, no visible polyps  OC/OP: thick tongue midline, teeth in good repair,  MMM, no erythema or exudate  Neck: no LAD or masses, no bruits  Face: no TTP over sinuses  Chest: equal chest excursion, no retrations  Skin: no visible concerning lesions of face, dry and intact  Lymph: no neck LAD  Neuro: CN II-VII, IX - XII intact, finger to nose testing brisk and intact, EOM intact, Romberg negative, no visible spontaneous nystagmus, no ataxia, Head impulse test negative , Fukuda test negative, Joshua negative  Psych: alert & oriented x 3, reasonable, normal affect          Audio  Audiogram with normal hearing thresholds and SRT/ SD & Tymps.    Impression:   1. Vestibular neuronitis, unspecified laterality        2. Tinnitus of both ears  Ambulatory referral/consult to ENT      3. Class 3 obesity        4. Diplopia            Discussion and Plan:    Reviewed history, symptoms, exam findings and audiogram.    Extensively reviewed potential causes of dizziness and vertigo.  Discussed management with careful movement, adequate rest and hydration, avoiding stressful situations physically for a couple weeks. Minimize use of meclizine.    Expectations of gradual resolution discussed. If not improved in the next 3-4 weeks, Recommend scheduling a VNG (video nystagmogram). Call [phone]. Instructions for VNG testing to be provided (see AVS). Pending results, further recommendations may be provided.    Also discussed weight management - with cautious increase in activity and portion control.    Otherwise, follow up with our clinic for any ear, nose or throat problems ([phone]).          Parts or all of this note were created by voice recognition software; typographical errors in translating may be present.

## 2024-10-23 NOTE — PATIENT INSTRUCTIONS
Reviewed history, symptoms, exam findings and audiogram.    Extensively reviewed potential causes of dizziness and vertigo.  Discussed management with careful movement, adequate rest and hydration, avoiding stressful situations physically for a couple weeks. Minimize use of meclizine.    Expectations of gradual resolution discussed. If not improved in the next 3-4 weeks, Recommend scheduling a VNG (video nystagmogram). Call [phone]. Instructions for VNG testing to be provided (see below). Pending results, further recommendations may be provided.    INSTRUCTIONS FOR VNG testing:  Certain things and medications may affect the results of the VNG test.    Do NOT wear makeup (especially eyeliner or mascara)    Do NOT take any of the following medications for 48 hours prior to test: sleeping pills, tranquilizers or anti-anxiety meds (such as valium, librium, xanax), anti-histamines or over the counter cold medications, anti-dizzy medications (antivert (meclizine), dramamine),  or muscle relaxers (such as flexeril).     Seizure medications (Dilantin, phenobarbital, etc) interfere with the accuracy of the test. To obtain a more accurate test they should be stopped BUT ONLY AFTER CLEARANCE FROM THE PHYSICIAN WHO PRESCRIBED THE MEDICATION.    Heart and/or blood pressure medications are allowed.    Do NOT eat or drink anything other than small sips of water for 4 hours prior to the VNG.    Do NOT drink alcoholic beverages for 24 hours prior to the test.    If you are diabetic, please inform the  when booking your appointment. Please schedule a time that would make fasting for 4 hours most convenient to your medication routine. If you have any concerns, check with your primary care physician.

## 2024-10-23 NOTE — PROGRESS NOTES
Zonia Skaggs, a 49 y.o. female, was seen today in the clinic for an audiologic evaluation.  Ms. Skaggs reported a room-spinning dizziness, bilateral aural fullness, and bilateral otalgia that began a couple of weeks ago.  Patient reported she experienced tinnitus in the left ear a couple of days ago which has since resolved.    Tympanometry revealed Type A in the right ear and Type A in the left ear.  Audiogram results revealed normal hearing sensitivity, bilaterally.  Speech reception thresholds were noted at 10 dB in the right ear and 5 dB in the left ear.  Speech discrimination scores were 96% in the right ear and 100% in the left ear.    Recommendations:  Otologic evaluation  Hearing protection when in noise  Repeat audiogram as needed

## 2024-10-24 ENCOUNTER — OFFICE VISIT (OUTPATIENT)
Dept: PSYCHIATRY | Facility: CLINIC | Age: 49
End: 2024-10-24
Payer: COMMERCIAL

## 2024-10-24 DIAGNOSIS — F31.77 BIPOLAR I DISORDER, MOST RECENT EPISODE MIXED, IN PARTIAL REMISSION: Primary | ICD-10-CM

## 2024-10-24 NOTE — PROGRESS NOTES
"The patient location is: home  The chief complaint leading to consultation is: depression, anxiety    Visit type: audiovisual    Face to Face time with patient: 45 minutes  55 minutes of total time spent on the encounter, which includes face to face time and non-face to face time preparing to see the patient (eg, review of tests), Obtaining and/or reviewing separately obtained history, Documenting clinical information in the electronic or other health record, Independently interpreting results (not separately reported) and communicating results to the patient/family/caregiver, or Care coordination (not separately reported).     Each patient to whom he or she provides medical services by telemedicine is:  (1) informed of the relationship between the physician and patient and the respective role of any other health care provider with respect to management of the patient; and (2) notified that he or she may decline to receive medical services by telemedicine and may withdraw from such care at any time.    Notes:    Individual Psychotherapy (PhD/LCSW)    10/24/2024    Site:  Telemed         Therapeutic Intervention: Met with patient.  Outpatient - Insight oriented psychotherapy 60 min - CPT code 45321 and Outpatient - Supportive psychotherapy 60 min - CPT Code 39810    Chief complaint/reason for encounter: depression and anxiety     Interval history and content of current session:  Patient returns for psychotherapy. Patient reports that she is "okay". States that she has been having vertigo problems the last week after having a virus. States that she found a  online and paid three months in advance to start losing weight. States that she has been stable mentally, hasn't had any serious ups or downs. Found out that daughter isn't coming home for ana. States that things have been going well for the most part.     Scheduled follow up for 11/20    Treatment plan:  Target symptoms: depression, anxiety "   Why chosen therapy is appropriate versus another modality: relevant to diagnosis, evidence based practice  Outcome monitoring methods: self-report, observation  Therapeutic intervention type: insight oriented psychotherapy, supportive psychotherapy    Risk parameters:  Patient reports no suicidal ideation  Patient reports no homicidal ideation  Patient reports no self-injurious behavior  Patient reports no violent behavior    Verbal deficits: None    Patient's response to intervention:  The patient's response to intervention is accepting.    Progress toward goals and other mental status changes:  The patient's progress toward goals is good.    Diagnosis:     ICD-10-CM ICD-9-CM   1. Bipolar I disorder, most recent episode mixed, in partial remission  F31.77 296.65     Plan:  individual psychotherapy and medication management by physician    Return to clinic: 1 month    Length of Service (minutes): 60    Cassandra Rockweiler, LCSW-NEDA

## 2024-11-01 ENCOUNTER — OFFICE VISIT (OUTPATIENT)
Dept: FAMILY MEDICINE | Facility: CLINIC | Age: 49
End: 2024-11-01
Payer: COMMERCIAL

## 2024-11-01 ENCOUNTER — LAB VISIT (OUTPATIENT)
Dept: LAB | Facility: HOSPITAL | Age: 49
End: 2024-11-01
Attending: FAMILY MEDICINE
Payer: COMMERCIAL

## 2024-11-01 ENCOUNTER — TELEPHONE (OUTPATIENT)
Dept: BARIATRICS | Facility: CLINIC | Age: 49
End: 2024-11-01
Payer: COMMERCIAL

## 2024-11-01 VITALS
DIASTOLIC BLOOD PRESSURE: 84 MMHG | TEMPERATURE: 99 F | HEART RATE: 89 BPM | WEIGHT: 280.88 LBS | SYSTOLIC BLOOD PRESSURE: 120 MMHG | OXYGEN SATURATION: 97 % | HEIGHT: 65 IN | BODY MASS INDEX: 46.8 KG/M2 | RESPIRATION RATE: 17 BRPM

## 2024-11-01 DIAGNOSIS — H53.2 DIPLOPIA: ICD-10-CM

## 2024-11-01 DIAGNOSIS — Z00.00 ROUTINE GENERAL MEDICAL EXAMINATION AT A HEALTH CARE FACILITY: ICD-10-CM

## 2024-11-01 DIAGNOSIS — R52 BODY ACHES: ICD-10-CM

## 2024-11-01 DIAGNOSIS — M19.90 OSTEOARTHRITIS, UNSPECIFIED OSTEOARTHRITIS TYPE, UNSPECIFIED SITE: ICD-10-CM

## 2024-11-01 DIAGNOSIS — H53.2 DOUBLE VISION: ICD-10-CM

## 2024-11-01 DIAGNOSIS — Z12.31 OTHER SCREENING MAMMOGRAM: Primary | ICD-10-CM

## 2024-11-01 DIAGNOSIS — E66.813 CLASS 3 OBESITY: ICD-10-CM

## 2024-11-01 DIAGNOSIS — F31.70 BIPOLAR DISORDER IN PARTIAL REMISSION, MOST RECENT EPISODE UNSPECIFIED TYPE: ICD-10-CM

## 2024-11-01 DIAGNOSIS — R42 VERTIGO: Primary | ICD-10-CM

## 2024-11-01 PROBLEM — F31.2 BIPOLAR DISORDER, CURRENT EPISODE MANIC SEVERE WITH PSYCHOTIC FEATURES: Status: RESOLVED | Noted: 2019-05-21 | Resolved: 2024-11-01

## 2024-11-01 LAB
ALBUMIN SERPL BCP-MCNC: 3.8 G/DL (ref 3.5–5.2)
ALP SERPL-CCNC: 78 U/L (ref 40–150)
ALT SERPL W/O P-5'-P-CCNC: 41 U/L (ref 10–44)
ANION GAP SERPL CALC-SCNC: 8 MMOL/L (ref 8–16)
AST SERPL-CCNC: 20 U/L (ref 10–40)
BASOPHILS # BLD AUTO: 0.04 K/UL (ref 0–0.2)
BASOPHILS NFR BLD: 0.8 % (ref 0–1.9)
BILIRUB SERPL-MCNC: 0.6 MG/DL (ref 0.1–1)
BUN SERPL-MCNC: 13 MG/DL (ref 6–20)
CALCIUM SERPL-MCNC: 9.4 MG/DL (ref 8.7–10.5)
CHLORIDE SERPL-SCNC: 109 MMOL/L (ref 95–110)
CHOLEST SERPL-MCNC: 231 MG/DL (ref 120–199)
CHOLEST/HDLC SERPL: 4.8 {RATIO} (ref 2–5)
CO2 SERPL-SCNC: 26 MMOL/L (ref 23–29)
CREAT SERPL-MCNC: 0.8 MG/DL (ref 0.5–1.4)
DIFFERENTIAL METHOD BLD: ABNORMAL
EOSINOPHIL # BLD AUTO: 0.1 K/UL (ref 0–0.5)
EOSINOPHIL NFR BLD: 1.8 % (ref 0–8)
ERYTHROCYTE [DISTWIDTH] IN BLOOD BY AUTOMATED COUNT: 14.8 % (ref 11.5–14.5)
EST. GFR  (NO RACE VARIABLE): >60 ML/MIN/1.73 M^2
ESTIMATED AVG GLUCOSE: 137 MG/DL (ref 68–131)
GLUCOSE SERPL-MCNC: 123 MG/DL (ref 70–110)
HBA1C MFR BLD: 6.4 % (ref 4–5.6)
HCT VFR BLD AUTO: 37.4 % (ref 37–48.5)
HDLC SERPL-MCNC: 48 MG/DL (ref 40–75)
HDLC SERPL: 20.8 % (ref 20–50)
HGB BLD-MCNC: 10.6 G/DL (ref 12–16)
IMM GRANULOCYTES # BLD AUTO: 0.03 K/UL (ref 0–0.04)
IMM GRANULOCYTES NFR BLD AUTO: 0.6 % (ref 0–0.5)
LDLC SERPL CALC-MCNC: 160.6 MG/DL (ref 63–159)
LYMPHOCYTES # BLD AUTO: 1.5 K/UL (ref 1–4.8)
LYMPHOCYTES NFR BLD: 29.8 % (ref 18–48)
MCH RBC QN AUTO: 22.8 PG (ref 27–31)
MCHC RBC AUTO-ENTMCNC: 28.3 G/DL (ref 32–36)
MCV RBC AUTO: 81 FL (ref 82–98)
MONOCYTES # BLD AUTO: 0.4 K/UL (ref 0.3–1)
MONOCYTES NFR BLD: 6.9 % (ref 4–15)
NEUTROPHILS # BLD AUTO: 3 K/UL (ref 1.8–7.7)
NEUTROPHILS NFR BLD: 60.1 % (ref 38–73)
NONHDLC SERPL-MCNC: 183 MG/DL
NRBC BLD-RTO: 0 /100 WBC
PLATELET # BLD AUTO: 312 K/UL (ref 150–450)
PMV BLD AUTO: 8.8 FL (ref 9.2–12.9)
POTASSIUM SERPL-SCNC: 4.5 MMOL/L (ref 3.5–5.1)
PROT SERPL-MCNC: 7.2 G/DL (ref 6–8.4)
RBC # BLD AUTO: 4.64 M/UL (ref 4–5.4)
SODIUM SERPL-SCNC: 143 MMOL/L (ref 136–145)
TRIGL SERPL-MCNC: 112 MG/DL (ref 30–150)
TSH SERPL DL<=0.005 MIU/L-ACNC: 1.86 UIU/ML (ref 0.4–4)
WBC # BLD AUTO: 5.04 K/UL (ref 3.9–12.7)

## 2024-11-01 PROCEDURE — 83036 HEMOGLOBIN GLYCOSYLATED A1C: CPT | Performed by: FAMILY MEDICINE

## 2024-11-01 PROCEDURE — 85025 COMPLETE CBC W/AUTO DIFF WBC: CPT | Performed by: FAMILY MEDICINE

## 2024-11-01 PROCEDURE — 99999 PR PBB SHADOW E&M-EST. PATIENT-LVL IV: CPT | Mod: PBBFAC,,, | Performed by: FAMILY MEDICINE

## 2024-11-01 PROCEDURE — 80053 COMPREHEN METABOLIC PANEL: CPT | Performed by: FAMILY MEDICINE

## 2024-11-01 PROCEDURE — 84443 ASSAY THYROID STIM HORMONE: CPT | Performed by: FAMILY MEDICINE

## 2024-11-01 PROCEDURE — 80061 LIPID PANEL: CPT | Performed by: FAMILY MEDICINE

## 2024-11-01 RX ORDER — CELECOXIB 200 MG/1
200 CAPSULE ORAL 2 TIMES DAILY
Qty: 60 CAPSULE | Refills: 5 | Status: SHIPPED | OUTPATIENT
Start: 2024-11-01 | End: 2025-04-30

## 2024-11-04 ENCOUNTER — HOSPITAL ENCOUNTER (OUTPATIENT)
Dept: RADIOLOGY | Facility: HOSPITAL | Age: 49
Discharge: HOME | End: 2024-11-04
Attending: FAMILY MEDICINE
Payer: COMMERCIAL

## 2024-11-04 DIAGNOSIS — H53.2 DIPLOPIA: ICD-10-CM

## 2024-11-04 PROCEDURE — 70480 CT ORBIT/EAR/FOSSA W/O DYE: CPT | Mod: 26,,, | Performed by: RADIOLOGY

## 2024-11-04 PROCEDURE — 70480 CT ORBIT/EAR/FOSSA W/O DYE: CPT | Mod: TC

## 2024-11-05 ENCOUNTER — LAB VISIT (OUTPATIENT)
Dept: LAB | Facility: HOSPITAL | Age: 49
End: 2024-11-05
Attending: FAMILY MEDICINE
Payer: COMMERCIAL

## 2024-11-05 DIAGNOSIS — H53.2 DIPLOPIA: Primary | ICD-10-CM

## 2024-11-05 DIAGNOSIS — H53.2 DIPLOPIA: ICD-10-CM

## 2024-11-05 PROCEDURE — 83520 IMMUNOASSAY QUANT NOS NONAB: CPT | Performed by: FAMILY MEDICINE

## 2024-11-05 PROCEDURE — 36415 COLL VENOUS BLD VENIPUNCTURE: CPT | Mod: PO | Performed by: FAMILY MEDICINE

## 2024-11-05 PROCEDURE — 84445 ASSAY OF TSI GLOBULIN: CPT | Performed by: FAMILY MEDICINE

## 2024-11-07 ENCOUNTER — TELEPHONE (OUTPATIENT)
Dept: BARIATRICS | Facility: CLINIC | Age: 49
End: 2024-11-07
Payer: COMMERCIAL

## 2024-11-07 LAB — TSH RECEP AB SER-ACNC: <1.1 IU/L (ref 0–1.75)

## 2024-11-07 NOTE — TELEPHONE ENCOUNTER
----- Message from Nona sent at 11/1/2024  2:55 PM CDT -----  Regarding: check guarantor's note to see if patient would like to move forward  check guarantor's note to see if patient would like to move forward

## 2024-11-08 LAB — TSI SER-ACNC: <0.1 IU/L

## 2024-11-20 ENCOUNTER — OFFICE VISIT (OUTPATIENT)
Dept: FAMILY MEDICINE | Facility: CLINIC | Age: 49
End: 2024-11-20
Payer: COMMERCIAL

## 2024-11-20 ENCOUNTER — OFFICE VISIT (OUTPATIENT)
Dept: PSYCHIATRY | Facility: CLINIC | Age: 49
End: 2024-11-20
Payer: COMMERCIAL

## 2024-11-20 ENCOUNTER — PATIENT MESSAGE (OUTPATIENT)
Dept: OPTOMETRY | Facility: CLINIC | Age: 49
End: 2024-11-20
Payer: COMMERCIAL

## 2024-11-20 VITALS
WEIGHT: 282.63 LBS | OXYGEN SATURATION: 99 % | SYSTOLIC BLOOD PRESSURE: 130 MMHG | RESPIRATION RATE: 17 BRPM | BODY MASS INDEX: 47.09 KG/M2 | TEMPERATURE: 99 F | DIASTOLIC BLOOD PRESSURE: 80 MMHG | HEIGHT: 65 IN | HEART RATE: 97 BPM

## 2024-11-20 DIAGNOSIS — R73.03 BORDERLINE DIABETES: ICD-10-CM

## 2024-11-20 DIAGNOSIS — Z00.00 ROUTINE GENERAL MEDICAL EXAMINATION AT A HEALTH CARE FACILITY: Primary | ICD-10-CM

## 2024-11-20 DIAGNOSIS — H53.2 DIPLOPIA: Primary | ICD-10-CM

## 2024-11-20 DIAGNOSIS — F31.77 BIPOLAR I DISORDER, MOST RECENT EPISODE MIXED, IN PARTIAL REMISSION: Primary | ICD-10-CM

## 2024-11-20 DIAGNOSIS — D50.8 OTHER IRON DEFICIENCY ANEMIA: ICD-10-CM

## 2024-11-20 DIAGNOSIS — R42 VERTIGO: ICD-10-CM

## 2024-11-20 DIAGNOSIS — H53.2 DIPLOPIA: ICD-10-CM

## 2024-11-20 PROCEDURE — 3044F HG A1C LEVEL LT 7.0%: CPT | Mod: CPTII,S$GLB,, | Performed by: FAMILY MEDICINE

## 2024-11-20 PROCEDURE — 3079F DIAST BP 80-89 MM HG: CPT | Mod: CPTII,S$GLB,, | Performed by: FAMILY MEDICINE

## 2024-11-20 PROCEDURE — 99999 PR PBB SHADOW E&M-EST. PATIENT-LVL V: CPT | Mod: PBBFAC,,, | Performed by: FAMILY MEDICINE

## 2024-11-20 PROCEDURE — 90785 PSYTX COMPLEX INTERACTIVE: CPT | Mod: 95,,, | Performed by: SOCIAL WORKER

## 2024-11-20 PROCEDURE — 3008F BODY MASS INDEX DOCD: CPT | Mod: CPTII,S$GLB,, | Performed by: FAMILY MEDICINE

## 2024-11-20 PROCEDURE — 99396 PREV VISIT EST AGE 40-64: CPT | Mod: S$GLB,,, | Performed by: FAMILY MEDICINE

## 2024-11-20 PROCEDURE — 3075F SYST BP GE 130 - 139MM HG: CPT | Mod: CPTII,S$GLB,, | Performed by: FAMILY MEDICINE

## 2024-11-20 PROCEDURE — 1159F MED LIST DOCD IN RCRD: CPT | Mod: CPTII,S$GLB,, | Performed by: FAMILY MEDICINE

## 2024-11-20 PROCEDURE — 90834 PSYTX W PT 45 MINUTES: CPT | Mod: 95,,, | Performed by: SOCIAL WORKER

## 2024-11-20 PROCEDURE — 3044F HG A1C LEVEL LT 7.0%: CPT | Mod: CPTII,95,, | Performed by: SOCIAL WORKER

## 2024-11-20 NOTE — PROGRESS NOTES
Chief Complaint   Patient presents with    Annual Exam       HPI  Zonia Skaggs is a 49 y.o. female with multiple medical diagnoses as listed in the medical history and problem list that presents for annual exam     Labs  Elevated cholesterol with LDL of 160, family history of heart disease and prediabetes   She is exercising with a    Hx of anemia, attributed to low iron, she is not taking iron supplement, we also discussed that her psychiatry medications may affect her blood count    Double vision and vertigo  She is having dizziness and double vision, CT scan recommend optho evaluation as her thyrodi levels are not consistent with graves disease, she is having trouble getting an appointment  Recommendations in ENT notes for her to schedule VNG test      ALLERGIES AND MEDICATIONS: updated and reviewed.  Review of patient's allergies indicates:   Allergen Reactions    Penicillins Hives and Shortness Of Breath    Morphine      Medication List with Changes/Refills   Current Medications    AZELASTINE (ASTELIN) 137 MCG (0.1 %) NASAL SPRAY    1 spray (137 mcg total) by Nasal route 2 (two) times daily.    BUPROPION (WELLBUTRIN XL) 150 MG TB24 TABLET    Take 1 tablet (150 mg total) by mouth once daily.    CARIPRAZINE (VRAYLAR) 1.5 MG CAP    Take 1 capsule (1.5 mg total) by mouth once daily.    CELECOXIB (CELEBREX) 200 MG CAPSULE    Take 1 capsule (200 mg total) by mouth 2 (two) times daily.    CETIRIZINE (ZYRTEC) 5 MG TABLET    Take 1 tablet (5 mg total) by mouth once daily.    FERROUS SULFATE (IRON ORAL)    Take by mouth.    FLUTICASONE PROPIONATE (FLONASE) 50 MCG/ACTUATION NASAL SPRAY    1 spray (50 mcg total) by Each Nostril route once daily.    MECLIZINE (ANTIVERT) 25 MG TABLET    Take 1 tablet (25 mg total) by mouth 3 (three) times daily as needed for Dizziness (or at least one HS for 1 week when vertigo active).    MULTIVITAMIN WITH MINERALS TABLET    Take 1 tablet by mouth once daily.    OMEPRAZOLE  "(PRILOSEC) 20 MG CAPSULE    Take 20 mg by mouth once daily.       ROS  Review of Systems   Constitutional:  Negative for chills, diaphoresis, fatigue, fever and unexpected weight change.   HENT:  Negative for rhinorrhea, sinus pressure, sore throat and tinnitus.    Eyes:  Negative for photophobia and visual disturbance.   Respiratory:  Negative for cough, shortness of breath and wheezing.    Cardiovascular:  Negative for chest pain and palpitations.   Gastrointestinal:  Negative for abdominal pain, blood in stool, constipation, diarrhea, nausea and vomiting.   Genitourinary:  Negative for dysuria, flank pain, frequency and vaginal discharge.   Musculoskeletal:  Negative for arthralgias and joint swelling.   Skin:  Negative for rash.   Neurological:  Positive for dizziness. Negative for speech difficulty, weakness, light-headedness and headaches.   Psychiatric/Behavioral:  Negative for behavioral problems and dysphoric mood.        Physical Exam  Vitals:    11/20/24 1014   BP: 130/80   BP Location: Left arm   Patient Position: Sitting   Pulse: 97   Resp: 17   Temp: 98.5 °F (36.9 °C)   TempSrc: Oral   SpO2: 99%   Weight: 128.2 kg (282 lb 10.1 oz)   Height: 5' 5" (1.651 m)    Body mass index is 47.03 kg/m².  Weight: 128.2 kg (282 lb 10.1 oz)   Height: 5' 5" (165.1 cm)     Physical Exam  Vitals reviewed.   Constitutional:       General: She is not in acute distress.     Appearance: She is well-developed.   HENT:      Head: Normocephalic and atraumatic.      Right Ear: A middle ear effusion is present.      Left Ear: A middle ear effusion is present.      Mouth/Throat:      Pharynx: No oropharyngeal exudate.   Neck:      Thyroid: No thyromegaly.   Cardiovascular:      Rate and Rhythm: Normal rate and regular rhythm.      Heart sounds: No murmur heard.     No friction rub. No gallop.   Pulmonary:      Effort: Pulmonary effort is normal. No respiratory distress.      Breath sounds: Normal breath sounds. No wheezing or " rales.   Abdominal:      General: Bowel sounds are normal. There is no distension.      Palpations: Abdomen is soft. There is no mass.      Tenderness: There is no abdominal tenderness. There is no guarding or rebound.   Musculoskeletal:      Cervical back: Neck supple.   Lymphadenopathy:      Cervical: No cervical adenopathy.   Skin:     General: Skin is warm and dry.      Findings: No rash.   Neurological:      General: No focal deficit present.      Mental Status: She is alert. Mental status is at baseline.   Psychiatric:         Mood and Affect: Mood normal.         Thought Content: Thought content normal.         Health Maintenance         Date Due Completion Date    Mammogram 06/20/2020 6/20/2019    COVID-19 Vaccine (3 - 2024-25 season) 09/01/2024 5/7/2021    Hemoglobin A1c (Prediabetes) 11/01/2025 11/1/2024    Cervical Cancer Screening 11/10/2026 11/10/2021    Colorectal Cancer Screening 05/14/2027 5/14/2024    TETANUS VACCINE 05/21/2029 5/21/2019    Lipid Panel 11/01/2029 11/1/2024    RSV Vaccine (Age 60+ and Pregnant patients) (1 - 1-dose 75+ series) 10/21/2052 ---            Health maintenance reviewed and addressed as ordered      ASSESSMENT/PLAN       1. Routine general medical examination at a health care facility  discussed healthy lifestyle modification with exercise and healthy diet, reviewed age appropriate screening and healthy maintenance      2. Borderline diabetes  Continue lifestyle changes and exercise    3. Other iron deficiency anemia  Monitor in 6 mos  Recommend she begin iron supplement may start with daily vitamin with iron  - CBC Auto Differential; Future  - Iron and TIBC; Future  - Ferritin; Future    4. Diplopia  Urgent consult to optho placed  Recommend she reach out to insurance to find another provider in network  - Ambulatory referral/consult to Ophthalmology; Future    5. Vertigo    Recommend she follow up with ENT to schedule VNG    Snehal Reynoso MD  11/20/2024 10:42  AM        Follow up in about 6 months (around 5/20/2025) for management of chronic conditions.    Orders Placed This Encounter   Procedures    CBC Auto Differential    Iron and TIBC    Ferritin    Ambulatory referral/consult to Ophthalmology

## 2024-11-20 NOTE — PROGRESS NOTES
"The patient location is: home  The chief complaint leading to consultation is: depression, anxiety    Visit type: audiovisual    Face to Face time with patient: 45 minutes  55 minutes of total time spent on the encounter, which includes face to face time and non-face to face time preparing to see the patient (eg, review of tests), Obtaining and/or reviewing separately obtained history, Documenting clinical information in the electronic or other health record, Independently interpreting results (not separately reported) and communicating results to the patient/family/caregiver, or Care coordination (not separately reported).     Each patient to whom he or she provides medical services by telemedicine is:  (1) informed of the relationship between the physician and patient and the respective role of any other health care provider with respect to management of the patient; and (2) notified that he or she may decline to receive medical services by telemedicine and may withdraw from such care at any time.    Notes:    Individual Psychotherapy (PhD/LCSW)    11/20/2024    Site:  Telemed         Therapeutic Intervention: Met with patient.  Outpatient - Insight oriented psychotherapy 60 min - CPT code 74941 and Outpatient - Supportive psychotherapy 60 min - CPT Code 89170    Chief complaint/reason for encounter: depression and anxiety     Interval history and content of current session:  Patient returns for psychotherapy. Patient reports that she is "okay". States that she has been going to a couple different doctors appointments to figure out her vertigo and other issues. Not as far as she would like to be with training program she signed up for. States that mentally she has been doing okay. Still sad that daughter isn't going to be able to come for holidays.    Scheduled follow up for 12/18    Treatment plan:  Target symptoms: depression, anxiety   Why chosen therapy is appropriate versus another modality: relevant to " diagnosis, evidence based practice  Outcome monitoring methods: self-report, observation  Therapeutic intervention type: insight oriented psychotherapy, supportive psychotherapy    Risk parameters:  Patient reports no suicidal ideation  Patient reports no homicidal ideation  Patient reports no self-injurious behavior  Patient reports no violent behavior    Verbal deficits: None    Patient's response to intervention:  The patient's response to intervention is accepting.    Progress toward goals and other mental status changes:  The patient's progress toward goals is good.    Diagnosis:     ICD-10-CM ICD-9-CM   1. Bipolar I disorder, most recent episode mixed, in partial remission  F31.77 296.65     Plan:  individual psychotherapy and medication management by physician    Return to clinic: 1 month    Length of Service (minutes): 60    Cassandra Rockweiler, LCSW-BACS

## 2024-11-20 NOTE — PATIENT INSTRUCTIONS
For the ENT    Expectations of gradual resolution discussed. If not improved in the next 3-4 weeks, Recommend scheduling a VNG (video nystagmogram). Call [phone]. Instructions for VNG testing to be provided (see AVS). Pending results, further recommendations may be provided.     NSTRUCTIONS FOR VNG testing:  Certain things and medications may affect the results of the VNG test.     Do NOT wear makeup (especially eyeliner or mascara)     Do NOT take any of the following medications for 48 hours prior to test: sleeping pills, tranquilizers or anti-anxiety meds (such as valium, librium, xanax), anti-histamines or over the counter cold medications, anti-dizzy medications (antivert (meclizine), dramamine),  or muscle relaxers (such as flexeril).      Seizure medications (Dilantin, phenobarbital, etc) interfere with the accuracy of the test. To obtain a more accurate test they should be stopped BUT ONLY AFTER CLEARANCE FROM THE PHYSICIAN WHO PRESCRIBED THE MEDICATION.     Heart and/or blood pressure medications are allowed.     Do NOT eat or drink anything other than small sips of water for 4 hours prior to the VNG.     Do NOT drink alcoholic beverages for 24 hours prior to the test.     If you are diabetic, please inform the  when booking your appointment. Please schedule a time that would make fasting for 4 hours most convenient to your medication routine. If you have any concerns, check with your primary care physician.

## 2024-12-18 ENCOUNTER — OFFICE VISIT (OUTPATIENT)
Dept: PSYCHIATRY | Facility: CLINIC | Age: 49
End: 2024-12-18
Payer: COMMERCIAL

## 2024-12-18 DIAGNOSIS — F31.77 BIPOLAR I DISORDER, MOST RECENT EPISODE MIXED, IN PARTIAL REMISSION: Primary | ICD-10-CM

## 2024-12-18 PROCEDURE — 90834 PSYTX W PT 45 MINUTES: CPT | Mod: 95,,, | Performed by: SOCIAL WORKER

## 2024-12-18 PROCEDURE — 3044F HG A1C LEVEL LT 7.0%: CPT | Mod: CPTII,95,, | Performed by: SOCIAL WORKER

## 2024-12-18 NOTE — PROGRESS NOTES
"The patient location is: home  The chief complaint leading to consultation is: depression, anxiety    Visit type: audiovisual    Face to Face time with patient: 45 minutes  55 minutes of total time spent on the encounter, which includes face to face time and non-face to face time preparing to see the patient (eg, review of tests), Obtaining and/or reviewing separately obtained history, Documenting clinical information in the electronic or other health record, Independently interpreting results (not separately reported) and communicating results to the patient/family/caregiver, or Care coordination (not separately reported).     Each patient to whom he or she provides medical services by telemedicine is:  (1) informed of the relationship between the physician and patient and the respective role of any other health care provider with respect to management of the patient; and (2) notified that he or she may decline to receive medical services by telemedicine and may withdraw from such care at any time.    Notes:    Individual Psychotherapy (PhD/LCSW)    12/18/2024    Site:  Telemed         Therapeutic Intervention: Met with patient.  Outpatient - Insight oriented psychotherapy 60 min - CPT code 74923 and Outpatient - Supportive psychotherapy 60 min - CPT Code 99604    Chief complaint/reason for encounter: depression and anxiety     Interval history and content of current session:  Patient returns for psychotherapy. Patient reports that she is "okay". States that stressors are better from the holiday. States that this week she has been working out. Enjoys working with someone to keep her accountable. Discussed that she would like to get into reading. States that she got depressed about three weeks ago and felt that way for two weeks. Didn't feel like doing anything or going anywhere. States that she got in her feelings about relationship with mother. States that she just pushed herself to get up and get out of her funk. " Discussed different tools that she can work on when she is depressed.     Treatment plan:  Target symptoms: depression, anxiety   Why chosen therapy is appropriate versus another modality: relevant to diagnosis, evidence based practice  Outcome monitoring methods: self-report, observation  Therapeutic intervention type: insight oriented psychotherapy, supportive psychotherapy    Risk parameters:  Patient reports no suicidal ideation  Patient reports no homicidal ideation  Patient reports no self-injurious behavior  Patient reports no violent behavior    Verbal deficits: None    Patient's response to intervention:  The patient's response to intervention is accepting.    Progress toward goals and other mental status changes:  The patient's progress toward goals is good.    Diagnosis:     ICD-10-CM ICD-9-CM   1. Bipolar I disorder, most recent episode mixed, in partial remission  F31.77 296.65     Plan:  individual psychotherapy and medication management by physician    Return to clinic: 1 month    Length of Service (minutes): 60    Cassandra Rockweiler, LCSW-BACS

## 2025-01-07 ENCOUNTER — PATIENT OUTREACH (OUTPATIENT)
Dept: ADMINISTRATIVE | Facility: HOSPITAL | Age: 50
End: 2025-01-07
Payer: COMMERCIAL

## 2025-02-24 ENCOUNTER — OFFICE VISIT (OUTPATIENT)
Dept: FAMILY MEDICINE | Facility: CLINIC | Age: 50
End: 2025-02-24
Payer: COMMERCIAL

## 2025-02-24 ENCOUNTER — LAB VISIT (OUTPATIENT)
Dept: LAB | Facility: HOSPITAL | Age: 50
End: 2025-02-24
Payer: COMMERCIAL

## 2025-02-24 ENCOUNTER — E-VISIT (OUTPATIENT)
Dept: FAMILY MEDICINE | Facility: CLINIC | Age: 50
End: 2025-02-24
Payer: COMMERCIAL

## 2025-02-24 VITALS
HEART RATE: 98 BPM | WEIGHT: 281.75 LBS | DIASTOLIC BLOOD PRESSURE: 78 MMHG | OXYGEN SATURATION: 98 % | RESPIRATION RATE: 18 BRPM | TEMPERATURE: 98 F | SYSTOLIC BLOOD PRESSURE: 120 MMHG | BODY MASS INDEX: 46.89 KG/M2

## 2025-02-24 DIAGNOSIS — Z87.19 HISTORY OF VENTRAL HERNIA REPAIR: ICD-10-CM

## 2025-02-24 DIAGNOSIS — R10.33 PERIUMBILICAL ABDOMINAL PAIN: ICD-10-CM

## 2025-02-24 DIAGNOSIS — E66.01 MORBID (SEVERE) OBESITY DUE TO EXCESS CALORIES: ICD-10-CM

## 2025-02-24 DIAGNOSIS — Z98.890 HISTORY OF VENTRAL HERNIA REPAIR: ICD-10-CM

## 2025-02-24 DIAGNOSIS — R09.82 POSTNASAL DRIP: ICD-10-CM

## 2025-02-24 DIAGNOSIS — R10.9 ABDOMINAL PAIN, UNSPECIFIED ABDOMINAL LOCATION: Primary | ICD-10-CM

## 2025-02-24 DIAGNOSIS — R11.2 NAUSEA AND VOMITING, UNSPECIFIED VOMITING TYPE: ICD-10-CM

## 2025-02-24 DIAGNOSIS — R10.33 PERIUMBILICAL ABDOMINAL PAIN: Primary | ICD-10-CM

## 2025-02-24 LAB
ANION GAP SERPL CALC-SCNC: 11 MMOL/L (ref 8–16)
BASOPHILS # BLD AUTO: 0.05 K/UL (ref 0–0.2)
BASOPHILS NFR BLD: 0.7 % (ref 0–1.9)
BUN SERPL-MCNC: 17 MG/DL (ref 6–20)
CALCIUM SERPL-MCNC: 9.6 MG/DL (ref 8.7–10.5)
CHLORIDE SERPL-SCNC: 106 MMOL/L (ref 95–110)
CO2 SERPL-SCNC: 24 MMOL/L (ref 23–29)
CREAT SERPL-MCNC: 0.9 MG/DL (ref 0.5–1.4)
DIFFERENTIAL METHOD BLD: ABNORMAL
EOSINOPHIL # BLD AUTO: 0.1 K/UL (ref 0–0.5)
EOSINOPHIL NFR BLD: 1 % (ref 0–8)
ERYTHROCYTE [DISTWIDTH] IN BLOOD BY AUTOMATED COUNT: 14.9 % (ref 11.5–14.5)
EST. GFR  (NO RACE VARIABLE): >60 ML/MIN/1.73 M^2
GLUCOSE SERPL-MCNC: 109 MG/DL (ref 70–110)
HCT VFR BLD AUTO: 39 % (ref 37–48.5)
HGB BLD-MCNC: 11.4 G/DL (ref 12–16)
IMM GRANULOCYTES # BLD AUTO: 0.04 K/UL (ref 0–0.04)
IMM GRANULOCYTES NFR BLD AUTO: 0.6 % (ref 0–0.5)
LYMPHOCYTES # BLD AUTO: 1.8 K/UL (ref 1–4.8)
LYMPHOCYTES NFR BLD: 26.8 % (ref 18–48)
MCH RBC QN AUTO: 23.5 PG (ref 27–31)
MCHC RBC AUTO-ENTMCNC: 29.2 G/DL (ref 32–36)
MCV RBC AUTO: 80 FL (ref 82–98)
MONOCYTES # BLD AUTO: 0.5 K/UL (ref 0.3–1)
MONOCYTES NFR BLD: 6.7 % (ref 4–15)
NEUTROPHILS # BLD AUTO: 4.3 K/UL (ref 1.8–7.7)
NEUTROPHILS NFR BLD: 64.2 % (ref 38–73)
NRBC BLD-RTO: 0 /100 WBC
PLATELET # BLD AUTO: 340 K/UL (ref 150–450)
PMV BLD AUTO: 9.3 FL (ref 9.2–12.9)
POTASSIUM SERPL-SCNC: 4.3 MMOL/L (ref 3.5–5.1)
RBC # BLD AUTO: 4.86 M/UL (ref 4–5.4)
SODIUM SERPL-SCNC: 141 MMOL/L (ref 136–145)
WBC # BLD AUTO: 6.72 K/UL (ref 3.9–12.7)

## 2025-02-24 PROCEDURE — 85025 COMPLETE CBC W/AUTO DIFF WBC: CPT

## 2025-02-24 PROCEDURE — 99999 PR PBB SHADOW E&M-EST. PATIENT-LVL IV: CPT | Mod: PBBFAC,,,

## 2025-02-24 PROCEDURE — 36415 COLL VENOUS BLD VENIPUNCTURE: CPT | Mod: PN

## 2025-02-24 PROCEDURE — 80048 BASIC METABOLIC PNL TOTAL CA: CPT

## 2025-02-24 RX ORDER — IBUPROFEN 600 MG/1
600 TABLET ORAL EVERY 8 HOURS PRN
Qty: 30 TABLET | Refills: 0 | Status: SHIPPED | OUTPATIENT
Start: 2025-02-24

## 2025-02-24 RX ORDER — AZELASTINE 1 MG/ML
1 SPRAY, METERED NASAL 2 TIMES DAILY
Qty: 30 ML | Refills: 3 | Status: SHIPPED | OUTPATIENT
Start: 2025-02-24 | End: 2026-02-24

## 2025-02-24 RX ORDER — ONDANSETRON 4 MG/1
4 TABLET, ORALLY DISINTEGRATING ORAL EVERY 12 HOURS PRN
Qty: 30 TABLET | Refills: 0 | Status: SHIPPED | OUTPATIENT
Start: 2025-02-24

## 2025-02-24 NOTE — PROGRESS NOTES
Patient ID: Zonia Skaggs is a 49 y.o. female.    Chief Complaint: General Illness (Entered automatically based on patient selection in [employer].)    The patient initiated a request through [employer] on 2/24/2025 for evaluation and management with a chief complaint of General Illness (Entered automatically based on patient selection in [employer].)     I evaluated the questionnaire submission on 02/24/2025.    [facility]-Common Problems    2/24/2025  7:59 AM CST - Filed by Patient   What do you need help with? Other Concern   Do you agree to participate in an E-Visit? Yes   If you have any of the following symptoms, please go to the nearest emergency room or call 911: I acknowledge   Do you have any of the following pregnancy-related conditions? None   What is the main issue you would like addressed today? Possible return of hernia   Please describe your symptoms. Vomitting when working out, tightness where the surgery was repaired before.   Where is your problem located? Above the belly button   On a scale of 1-10, where 10 is the worst you can imagine, how severe are your symptoms? (range: 1 - 10) 7   Have you had these symptoms before? Yes   How long have you had these symptoms? About a week   What helps with your symptoms? Not working out.   What makes your symptoms feel worse? Working out.   Are your symptoms related to a condition you currently have? Not sure   Please describe any probable cause for your symptoms. Maybe to heavy with the cross body workout   Provide any additional information you feel is important. Above the belly button is very hard and hurts when pushed on   Please attach any relevant images or files    Are you able to take your vital signs? No         Encounter Diagnosis   Name Primary?    Abdominal pain, unspecified abdominal location Yes        No orders of the defined types were placed in this encounter.       Will schedule for in person visit with MARIBETH, may need CT scan to r/o  recurrence of hernia    No follow-ups on file.      E-Visit Time Tracking:    Day 1 Time (in minutes): 5    Total Time (in minutes): 5

## 2025-02-24 NOTE — Clinical Note
Please schedule for in person visit with either Stefan or Girish for abdominal pain with possible hernia recurrence

## 2025-02-24 NOTE — PROGRESS NOTES
HPI     Zonia Skaggs is a 49 y.o. female with multiple medical diagnoses as listed in the medical history and problem list that presents for   Chief Complaint   Patient presents with    Emesis    Nausea    Abdominal Pain     S/P Hernia repair approx. 2019 and believe it has returned.       HPI  Pt with history of incarcerated ventral hernia repair on 7/23/2019 presents today for nausea/vomiting, and tightness at surgical site of her hernia repair above the belly button for the past 2 weeks. She notices it exacerbates most when exercising and notes that skin feels hard and tender to palpation right above her umbilicus. She gets nauseous and has episodes of vomiting. She's not able to tolerate cross body exercises. She's noticing that area is hardening more and progressively getting more painful.   Denies fevers, body aches, chast pain, chest tightness, hematemesis, blood in stool.    Assessment & Plan     1. Periumbilical abdominal pain  - Periumbilical area TTP on exam, no mass palpated. Will order basic labs and schedule patient for CT abd tomorrow. Can consider hernia recurrence vs muscle strain  - If hernia recurrence, will need to refer back out with surgery team.  - Advised on Zofran for nausea control and Ibuprofen for pain control  - Advised on strict ER precautions, such as intractable pain, fevers, uncontrollable nausea/vomiting prior to her CT scan.    - CT Abdomen Pelvis With IV Contrast NO Oral Contrast; Future  - CBC Auto Differential; Future  - Basic Metabolic Panel; Future  - ibuprofen (ADVIL,MOTRIN) 600 MG tablet; Take 1 tablet (600 mg total) by mouth every 8 (eight) hours as needed for Pain.  Dispense: 30 tablet; Refill: 0    2. History of ventral hernia repair  - Periumbilical area TTP on exam, no mass palpated. Will order basic labs and schedule patient for CT abd tomorrow  - Advised on Zofran for nausea control and Ibuprofen for pain control  - Advised on strict ER precautions, such as  "intractable pain, fevers, uncontrollable nausea/vomiting prior to her CT scan.    - CT Abdomen Pelvis With IV Contrast NO Oral Contrast; Future  - CBC Auto Differential; Future  - Basic Metabolic Panel; Future    3. Morbid (severe) obesity due to excess calories  Behavioral counseling for obesity.  History:  - Patient's BMI: Estimated body mass index is 46.89 kg/m² as calculated from the following:    Height as of 11/20/24: 5' 5" (1.651 m).    Weight as of this encounter: 127.8 kg (281 lb 12 oz).  - Patient reports challenges related to weight management.  - Patient meets criteria for obesity counseling: BMI >= 30 kg/m² or >= 25 kg/m² with associated comorbidities.  - Discussion focused on the benefits of sustained weight loss and its impact on health outcomes.  Plan:  Dietary Guidance: Recommended caloric intake tailored to patient needs, emphasizing nutrient-dense, low-calorie foods. Discussed portion control and meal planning strategies.  Physical Activity: Encouraged moderate physical activity for at least 150 minutes per week, tailored to patients current physical abilities and goals.  Behavioral Strategies: Addressed behaviors contributing to weight gain and set actionable goals, including [food diary, mindful eating, avoiding late-night snacks, etc.]  Motivational Counseling: Discussed the importance of intrinsic and extrinsic motivators in achieving sustained weight management. Reinforced positive behavior changes.  Follow-Up Plan: Patient instructed to return for follow-up for progress evaluation and continued counseling.  Time Spent: A total of 16 minutes was spent in face-to-face behavioral counseling focused on weight loss and obesity management.  Patient Understanding: Patient actively participated in the session and verbalized understanding of the discussed plan.      4. Nausea and vomiting, unspecified vomiting type  - Trial of Zofran. NO hematemesis reported. Reviewed previous EKG, no signs of QT " prolongation    - ondansetron (ZOFRAN-ODT) 4 MG TbDL; Take 1 tablet (4 mg total) by mouth every 12 (twelve) hours as needed (nausea).  Dispense: 30 tablet; Refill: 0    5. Postnasal drip  - Patient requesting for Astelin nasal spray  - No complaints of postnasal drip today    - azelastine (ASTELIN) 137 mcg (0.1 %) nasal spray; 1 spray (137 mcg total) by Nasal route 2 (two) times daily.  Dispense: 30 mL; Refill: 3    --------------------------------------------    Health Maintenance         Date Due Completion Date    Mammogram 06/20/2020 6/20/2019    COVID-19 Vaccine (3 - 2024-25 season) 09/01/2024 5/7/2021    Hemoglobin A1c (Prediabetes) 11/01/2025 11/1/2024    Cervical Cancer Screening 11/10/2026 11/10/2021    Colorectal Cancer Screening 05/14/2027 5/14/2024    TETANUS VACCINE 05/21/2029 5/21/2019    Lipid Panel 11/01/2029 11/1/2024    RSV Vaccine (Age 60+ and Pregnant patients) (1 - 1-dose 75+ series) 10/21/2052 ---            Health maintenance reviewed    Follow Up:  Follow up if symptoms worsen or fail to improve.    Exam     Review of Systems:  (as noted above)  Review of Systems   Constitutional:  Negative for chills, fatigue and fever.   HENT:  Negative for congestion and trouble swallowing.    Eyes:  Negative for visual disturbance.   Respiratory:  Negative for cough, chest tightness, shortness of breath and wheezing.    Cardiovascular:  Negative for chest pain and palpitations.   Gastrointestinal:  Positive for abdominal pain, nausea and vomiting. Negative for blood in stool and diarrhea.   Musculoskeletal:  Negative for arthralgias and myalgias.   Skin:  Negative for rash.   Neurological:  Negative for dizziness, weakness, light-headedness and headaches.   Psychiatric/Behavioral:  Negative for sleep disturbance. The patient is not nervous/anxious.        Physical Exam  Constitutional:       Appearance: Normal appearance. She is obese. She is not ill-appearing.   Cardiovascular:      Rate and Rhythm:  Normal rate and regular rhythm.      Pulses: Normal pulses.      Heart sounds: Normal heart sounds. No murmur heard.     No friction rub. No gallop.   Pulmonary:      Effort: Pulmonary effort is normal.      Breath sounds: Normal breath sounds.   Abdominal:      General: Bowel sounds are normal.      Palpations: Abdomen is soft. There is no mass.      Tenderness: There is abdominal tenderness in the periumbilical area. There is no guarding or rebound.   Skin:     General: Skin is warm and dry.      Capillary Refill: Capillary refill takes less than 2 seconds.   Neurological:      General: No focal deficit present.      Mental Status: She is alert and oriented to person, place, and time.       Vitals:    02/24/25 1354   BP: 120/78   BP Location: Left arm   Patient Position: Sitting   Pulse: 98   Resp: 18   Temp: 97.8 °F (36.6 °C)   TempSrc: Oral   SpO2: 98%   Weight: 127.8 kg (281 lb 12 oz)      Body mass index is 46.89 kg/m².        History     Past Medical History:   Diagnosis Date    Bipolar 1 disorder     Borderline diabetes 11/20/2019    Boxer's fracture, closed, initial encounter 06/22/2018    Gastroesophageal reflux disease without esophagitis 11/20/2019    GERD (gastroesophageal reflux disease)     History of psychiatric hospitalization     Iron deficiency anemia 05/21/2019    Seasonal allergic rhinitis 11/20/2019    Seasonal allergies     Sleep-related breathing disorder 08/23/2022    Therapy     used to follow with Dr. Gerber Rogers    Vaginal delivery     x5       Family History   Problem Relation Name Age of Onset    Bipolar disorder Maternal Aunt      Bipolar disorder Paternal Aunt      Diabetes type II Mother      Diabetes Mother      Hyperlipidemia Mother      Breast cancer Other Mat GGM     Diabetes Other Mat GGM     Colon cancer Neg Hx      Ovarian cancer Neg Hx      Anxiety disorder Neg Hx      Depression Neg Hx      Suicide Neg Hx      Stroke Neg Hx      Hypertension Neg Hx         Allergies and  Medications: (updated and reviewed)  Review of patient's allergies indicates:   Allergen Reactions    Penicillins Hives and Shortness Of Breath    Morphine      Current Medications[1]    Patient Care Team:  Snehal Reynoso MD as PCP - General (Internal Medicine)  Jaswinder Cuellar MD as Obstetrician (Obstetrics and Gynecology)         - The patient is given an After Visit Summary that lists all medications with directions, allergies, education, orders placed during this encounter and follow-up instructions.      - I have reviewed the patient's medical information including past medical and family history sections including the medications and allergies.      - We discussed the patient's current medications.   This note was generated with the assistance of ambient listening technology. Verbal consent was obtained by the patient and accompanying visitor(s) for the recording of patient appointment to facilitate this note. I attest to having reviewed and edited the generated note for accuracy, though some syntax or spelling errors may persist. Please contact the author of this note for any clarification.          Girish Estrada NP                      [1]   Current Outpatient Medications   Medication Sig Dispense Refill    azelastine (ASTELIN) 137 mcg (0.1 %) nasal spray 1 spray (137 mcg total) by Nasal route 2 (two) times daily. 30 mL 3    buPROPion (WELLBUTRIN XL) 150 MG TB24 tablet Take 1 tablet (150 mg total) by mouth once daily. 90 tablet 2    cariprazine (VRAYLAR) 1.5 mg Cap Take 1 capsule (1.5 mg total) by mouth once daily. 90 capsule 2    celecoxib (CELEBREX) 200 MG capsule Take 1 capsule (200 mg total) by mouth 2 (two) times daily. 60 capsule 5    ferrous sulfate (IRON ORAL) Take by mouth.      fluticasone propionate (FLONASE) 50 mcg/actuation nasal spray 1 spray (50 mcg total) by Each Nostril route once daily. 16 g 5    ibuprofen (ADVIL,MOTRIN) 600 MG tablet Take 1 tablet (600 mg total) by mouth  every 8 (eight) hours as needed for Pain. 30 tablet 0    multivitamin with minerals tablet Take 1 tablet by mouth once daily.      omeprazole (PRILOSEC) 20 MG capsule Take 20 mg by mouth once daily.      ondansetron (ZOFRAN-ODT) 4 MG TbDL Take 1 tablet (4 mg total) by mouth every 12 (twelve) hours as needed (nausea). 30 tablet 0     No current facility-administered medications for this visit.

## 2025-02-25 ENCOUNTER — RESULTS FOLLOW-UP (OUTPATIENT)
Dept: FAMILY MEDICINE | Facility: CLINIC | Age: 50
End: 2025-02-25
Payer: COMMERCIAL

## 2025-02-25 ENCOUNTER — HOSPITAL ENCOUNTER (OUTPATIENT)
Dept: RADIOLOGY | Facility: HOSPITAL | Age: 50
Discharge: HOME | End: 2025-02-25
Payer: COMMERCIAL

## 2025-02-25 DIAGNOSIS — R10.33 PERIUMBILICAL ABDOMINAL PAIN: ICD-10-CM

## 2025-02-25 DIAGNOSIS — Z87.19 HISTORY OF VENTRAL HERNIA REPAIR: ICD-10-CM

## 2025-02-25 DIAGNOSIS — Z98.890 HISTORY OF VENTRAL HERNIA REPAIR: ICD-10-CM

## 2025-02-25 PROCEDURE — 74177 CT ABD & PELVIS W/CONTRAST: CPT | Mod: TC

## 2025-02-25 PROCEDURE — 74177 CT ABD & PELVIS W/CONTRAST: CPT | Mod: 26,,, | Performed by: RADIOLOGY

## 2025-02-25 PROCEDURE — 25500020 PHARM REV CODE 255

## 2025-02-25 RX ADMIN — IOHEXOL 100 ML: 350 INJECTION, SOLUTION INTRAVENOUS at 09:02

## 2025-04-02 ENCOUNTER — OFFICE VISIT (OUTPATIENT)
Dept: PSYCHIATRY | Facility: CLINIC | Age: 50
End: 2025-04-02
Payer: COMMERCIAL

## 2025-04-02 DIAGNOSIS — F31.77 BIPOLAR I DISORDER, MOST RECENT EPISODE MIXED, IN PARTIAL REMISSION: Primary | ICD-10-CM

## 2025-04-02 PROCEDURE — 90832 PSYTX W PT 30 MINUTES: CPT | Mod: 95,,, | Performed by: SOCIAL WORKER

## 2025-04-02 PROCEDURE — 90785 PSYTX COMPLEX INTERACTIVE: CPT | Mod: 95,,, | Performed by: SOCIAL WORKER

## 2025-04-02 NOTE — PROGRESS NOTES
"The patient location is: home  The chief complaint leading to consultation is: depression, anxiety    Visit type: audiovisual    Face to Face time with patient: 25 minutes  35 minutes of total time spent on the encounter, which includes face to face time and non-face to face time preparing to see the patient (eg, review of tests), Obtaining and/or reviewing separately obtained history, Documenting clinical information in the electronic or other health record, Independently interpreting results (not separately reported) and communicating results to the patient/family/caregiver, or Care coordination (not separately reported).     Each patient to whom he or she provides medical services by telemedicine is:  (1) informed of the relationship between the physician and patient and the respective role of any other health care provider with respect to management of the patient; and (2) notified that he or she may decline to receive medical services by telemedicine and may withdraw from such care at any time.    Notes:    Individual Psychotherapy (PhD/LCSW)    4/2/2025    Site:  Telemed         Therapeutic Intervention: Met with patient.  Outpatient - Insight oriented psychotherapy 30 min - CPT code 70104 and Outpatient - Supportive psychotherapy 30 min - CPT Code 11283    Chief complaint/reason for encounter: depression and anxiety     Interval history and content of current session:  Patient returns for psychotherapy. Patient reports that she is "okay". States that she continues to work out but not dropping weight. Still using the  and enjoys having the support from the work out group. Trainer is very supportive and trying to keep patient motivated. Discussed how her mood has been up and down. States that she doesn't like going out but when she does she feels better. Discussed how staying at home starts to shrink the areas that you feel comfortable and so we don't go out. Discussed getting appointment set up with " [facility] to discuss medication and increased depression.     Scheduled follow up for 5/9    Treatment plan:  Target symptoms: depression, anxiety   Why chosen therapy is appropriate versus another modality: relevant to diagnosis, evidence based practice  Outcome monitoring methods: self-report, observation  Therapeutic intervention type: insight oriented psychotherapy, supportive psychotherapy    Risk parameters:  Patient reports no suicidal ideation  Patient reports no homicidal ideation  Patient reports no self-injurious behavior  Patient reports no violent behavior    Verbal deficits: None    Patient's response to intervention:  The patient's response to intervention is accepting.    Progress toward goals and other mental status changes:  The patient's progress toward goals is good.    Diagnosis:     ICD-10-CM ICD-9-CM   1. Bipolar I disorder, most recent episode mixed, in partial remission  F31.77 296.65       Plan:  individual psychotherapy and medication management by physician    Return to clinic: 1 month    Length of Service (minutes): 60    Cassandra Rockweiler, LCSW-NEDA

## 2025-04-22 NOTE — PROGRESS NOTES
"OBSTETRICS AND GYNECOLOGY    Chief Complaint:  Well Woman Exam, Establish Care     HPI:      Zonia Skaggs is a 49 y.o.  who presents today for well woman exam.    LMP: Patient's last menstrual period was 2025. Specifically, patient denies abnormal vaginal bleeding, abnormal discharge/odor, pelvic pain, or dysuria/hematuria. Ms. Skaggs is currently sexually active with a single male partner. She is currently using partner's vasectomy for contraception. She declines STD screening today. Menses starting to space out. Endorsing hot flashes and night sweats as well. She denies additional issues, problems, or complaints.     OB History          5    Para   5    Term   5            AB        Living   5         SAB        IAB        Ectopic        Multiple        Live Births   5               GYNHx:  Menarche 14  Irregular menses, menses occurring about every 6 months, pattern for about 2 years.   Light flow. Lasting 2 days.   Dysmenorrhea: no  History of STDs:  no  History of abnormal pap smears: no  Sexually active: yes  # of lifetime partners:  4   Breast/GYN/colon malignancy family history:  Maternal great grandmother breast cancer    ROS:     GENERAL: Feeling well overall.   BREASTS: Denies breast skin changes, lumps or nipple discharge.    URINARY: Denies dysuria, hematuria.    Physical Exam:      PHYSICAL EXAM:  /80 (Patient Position: Sitting)   Ht 5' 5" (1.651 m)   Wt 127.2 kg (280 lb 6.8 oz)   LMP 2025   BMI 46.67 kg/m²   Body mass index is 46.67 kg/m².     APPEARANCE:  Well nourished, well developed, in no acute distress.  Able to smile appropriately during our encounter. Makes eye contact. Pleasant.  PSYCH: Appropriate mood and affect.  SKIN:   No acne or hirsutism.  CARDIOVASCULAR:  No edema of peripheral extremities. Well perfused throughout.  RESP:  No accessory muscle use to breathe. Speaking comfortably in complete sentences.   BREASTS:  Symmetrical, with no " visible skin lesions or scars, no palpable masses. No nipple discharge or peau d'orange bilaterally. No palpable axillary LAD.  ABDOMEN:  Soft. Nonacute.    PELVIC:  Normal external genitalia without lesions. Normal hair distribution. Adequate perineal body, normal urethral meatus. Vagina moist and well rugated. Without lesions, without discharge. Cervix 3x4cm, parous. Cervix pink, without ectocervical lesions, discharge or tenderness.  No ectropion. No significant cystocele or rectocele.  Bimanual exam shows uterus to be mobile and nontender.  Adnexa without masses or tenderness.  Exam limited.    Female chaperone present.    Assessment/Plan:     Well Woman Exam  -- Counseled patient regarding healthy diet and regular exercise.   -- BP normotensive  -- She denies abuse and feels safe at home.   -- Pap smear     -- STD screening:  declines    -- Discussed options for vasomotor symptoms, to let me know if interested  -- Colonoscopy:  5/2024, repeat 3 years  -- Labs, reviewed today:  TSH WNL 2024  -- MMG:  schedule     Counseling:     Patient was counseled today on current ASCCP pap guidelines, the recommendation for yearly physical exams, and breast self awareness. She is to see her PCP for other health maintenance.     Use of the [facility] Patient Portal discussed and encouraged during today's visit.           As of April 1, 2021, the Cures Act has been passed nationally. This new law requires that all doctors progress notes, lab results, pathology reports and radiology reports be released IMMEDIATELY to the patient in the patient portal. That means that the results are released to you at the EXACT same time they are released to me. Therefore, with all of the patients that I have I am not able to reply to each patient exactly when the results come in. So there will be a delay from when you see the results to when I see them and have time to come up with a response to send you. Also I only see these results when I am  on the computer at work. So if the results come in over the weekend or after 5 pm of a work day, I will not see them until the next business day. As you can tell, this is a challenge as a physician to give every patient the quick response they hope for and deserve. So please be patient!   Thanks for your understanding and patience.      Answers submitted by the patient for this visit:  Gynecologic Exam Questionnaire  (Submitted on 2025)  Chief Complaint: Gynecologic exam  genital itching: No  genital lesions: No  genital odor: No  genital rash: No  missed menses: Yes  pelvic pain: No  vaginal bleeding: No  vaginal discharge: No  Chronicity: recurrent  Onset: more than 1 year ago  Frequency: intermittently  Progression since onset: unchanged  Pain severity: no pain  Affected side: both  Pregnant now?: No  abdominal pain: No  anorexia: No  back pain: Yes  chills: No  constipation: No  diarrhea: No  discolored urine: No  dysuria: No  fever: No  flank pain: No  frequency: Yes  headaches: No  hematuria: No  nausea: No  painful intercourse: Yes  rash: No  urgency: Yes  vomiting: No  Please select the characteristics of your discharge: : normal  Vaginal bleeding: spotting  Passing clots?: No  Passing tissue?: No  Aggravated by: heavy lifting, intercourse  treatments tried: acetaminophen  Improvement on treatment: moderate  Sexual activity: sexually active  Partner with STD symptoms: no  Birth control: vasectomy  Menstrual history: postmenopausal  STD: No  abdominal surgery: Yes   section: No  Ectopic pregnancy: No  Endometriosis: No  herpes simplex: No  gynecological surgery: No  menorrhagia: Yes  metrorrhagia: No  miscarriage: No  ovarian cysts: No  perineal abscess: No  PID: Yes  terminated pregnancy: No  vaginosis: No

## 2025-04-24 ENCOUNTER — OFFICE VISIT (OUTPATIENT)
Dept: OBSTETRICS AND GYNECOLOGY | Facility: CLINIC | Age: 50
End: 2025-04-24
Payer: COMMERCIAL

## 2025-04-24 VITALS
SYSTOLIC BLOOD PRESSURE: 116 MMHG | WEIGHT: 280.44 LBS | BODY MASS INDEX: 46.73 KG/M2 | HEIGHT: 65 IN | DIASTOLIC BLOOD PRESSURE: 80 MMHG

## 2025-04-24 DIAGNOSIS — Z11.51 SCREENING FOR HPV (HUMAN PAPILLOMAVIRUS): ICD-10-CM

## 2025-04-24 DIAGNOSIS — Z01.419 ENCOUNTER FOR WELL WOMAN EXAM: Primary | ICD-10-CM

## 2025-04-24 DIAGNOSIS — Z12.4 SCREENING FOR CERVICAL CANCER: ICD-10-CM

## 2025-04-24 PROCEDURE — 87624 HPV HI-RISK TYP POOLED RSLT: CPT | Performed by: STUDENT IN AN ORGANIZED HEALTH CARE EDUCATION/TRAINING PROGRAM

## 2025-04-24 PROCEDURE — 99999 PR PBB SHADOW E&M-EST. PATIENT-LVL III: CPT | Mod: PBBFAC,,, | Performed by: STUDENT IN AN ORGANIZED HEALTH CARE EDUCATION/TRAINING PROGRAM

## 2025-04-24 PROCEDURE — 88175 CYTOPATH C/V AUTO FLUID REDO: CPT | Performed by: STUDENT IN AN ORGANIZED HEALTH CARE EDUCATION/TRAINING PROGRAM

## 2025-05-02 ENCOUNTER — PATIENT OUTREACH (OUTPATIENT)
Dept: ADMINISTRATIVE | Facility: HOSPITAL | Age: 50
End: 2025-05-02
Payer: COMMERCIAL

## 2025-05-02 ENCOUNTER — RESULTS FOLLOW-UP (OUTPATIENT)
Dept: OBSTETRICS AND GYNECOLOGY | Facility: CLINIC | Age: 50
End: 2025-05-02

## 2025-05-09 ENCOUNTER — OFFICE VISIT (OUTPATIENT)
Dept: PSYCHIATRY | Facility: CLINIC | Age: 50
End: 2025-05-09
Payer: COMMERCIAL

## 2025-05-09 DIAGNOSIS — F31.77 BIPOLAR I DISORDER, MOST RECENT EPISODE MIXED, IN PARTIAL REMISSION: Primary | ICD-10-CM

## 2025-05-09 NOTE — PROGRESS NOTES
"The patient location is: home  The chief complaint leading to consultation is: depression, anxiety    Visit type: audiovisual    Face to Face time with patient: 45 minutes  55 minutes of total time spent on the encounter, which includes face to face time and non-face to face time preparing to see the patient (eg, review of tests), Obtaining and/or reviewing separately obtained history, Documenting clinical information in the electronic or other health record, Independently interpreting results (not separately reported) and communicating results to the patient/family/caregiver, or Care coordination (not separately reported).     Each patient to whom he or she provides medical services by telemedicine is:  (1) informed of the relationship between the physician and patient and the respective role of any other health care provider with respect to management of the patient; and (2) notified that he or she may decline to receive medical services by telemedicine and may withdraw from such care at any time.    Notes:    Individual Psychotherapy (PhD/LCSW)    5/9/2025    Site:  Telemed         Therapeutic Intervention: Met with patient.  Outpatient - Insight oriented psychotherapy 60 min - CPT code 10670 and Outpatient - Supportive psychotherapy 60 min - CPT Code 93771    Chief complaint/reason for encounter: depression and anxiety     Interval history and content of current session:  Patient returns for psychotherapy. Patient reports that she is "alright". States that blood sugar was off yesterday and felt off most of the day. States that she is borderline diabetic but continues to eat things she knows she shouldn't. States that she has been dealing with  going through his own mental health issues. States that he was frustrated that she took first deal on fixing the AC. 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 States that groceries are getting very expensive and needs to bring in some extra " income. States that oldest son is hopefully going to take HISET test. Next son is going to have to repeat 9th grade again and continues to struggle with his learning disability. Plans to get tutors next year.  States that she has been writing in the journal. Feels like it is helping to get her head out of the way. States that she hasn't been working out the last two weeks. States that she is still part of the workout group and going to her book group. States that she has been thinking about applying for disability if she isn't able to get a job. States that  is still working on getting his VISA.     Schedule follow up 6/13    Treatment plan:  Target symptoms: depression, anxiety   Why chosen therapy is appropriate versus another modality: relevant to diagnosis, evidence based practice  Outcome monitoring methods: self-report, observation  Therapeutic intervention type: insight oriented psychotherapy, supportive psychotherapy    Risk parameters:  Patient reports no suicidal ideation  Patient reports no homicidal ideation  Patient reports no self-injurious behavior  Patient reports no violent behavior    Verbal deficits: None    Patient's response to intervention:  The patient's response to intervention is accepting.    Progress toward goals and other mental status changes:  The patient's progress toward goals is good.    Diagnosis:     ICD-10-CM ICD-9-CM   1. Bipolar I disorder, most recent episode mixed, in partial remission  F31.77 296.65     Plan:  individual psychotherapy and medication management by physician    Return to clinic: 1 month    Length of Service (minutes): 60    Cassandra Rockweiler, LCSW-BACS

## 2025-05-12 ENCOUNTER — LAB VISIT (OUTPATIENT)
Dept: LAB | Facility: HOSPITAL | Age: 50
End: 2025-05-12
Attending: FAMILY MEDICINE
Payer: COMMERCIAL

## 2025-05-12 ENCOUNTER — PATIENT MESSAGE (OUTPATIENT)
Dept: FAMILY MEDICINE | Facility: CLINIC | Age: 50
End: 2025-05-12
Payer: COMMERCIAL

## 2025-05-12 ENCOUNTER — RESULTS FOLLOW-UP (OUTPATIENT)
Dept: FAMILY MEDICINE | Facility: CLINIC | Age: 50
End: 2025-05-12

## 2025-05-12 DIAGNOSIS — D50.8 OTHER IRON DEFICIENCY ANEMIA: ICD-10-CM

## 2025-05-12 LAB
ABSOLUTE EOSINOPHIL (OHS): 0.09 K/UL
ABSOLUTE MONOCYTE (OHS): 0.31 K/UL (ref 0.3–1)
ABSOLUTE NEUTROPHIL COUNT (OHS): 3.36 K/UL (ref 1.8–7.7)
BASOPHILS # BLD AUTO: 0.03 K/UL
BASOPHILS NFR BLD AUTO: 0.6 %
ERYTHROCYTE [DISTWIDTH] IN BLOOD BY AUTOMATED COUNT: 15.4 % (ref 11.5–14.5)
FERRITIN SERPL-MCNC: 40.2 NG/ML (ref 20–300)
HCT VFR BLD AUTO: 36.2 % (ref 37–48.5)
HGB BLD-MCNC: 10.9 GM/DL (ref 12–16)
IMM GRANULOCYTES # BLD AUTO: 0.03 K/UL (ref 0–0.04)
IMM GRANULOCYTES NFR BLD AUTO: 0.6 % (ref 0–0.5)
IRON SATN MFR SERPL: 10 % (ref 20–50)
IRON SERPL-MCNC: 36 UG/DL (ref 30–160)
LYMPHOCYTES # BLD AUTO: 1.61 K/UL (ref 1–4.8)
MCH RBC QN AUTO: 24.3 PG (ref 27–31)
MCHC RBC AUTO-ENTMCNC: 30.1 G/DL (ref 32–36)
MCV RBC AUTO: 81 FL (ref 82–98)
NUCLEATED RBC (/100WBC) (OHS): 0 /100 WBC
PLATELET # BLD AUTO: 316 K/UL (ref 150–450)
PMV BLD AUTO: 9.4 FL (ref 9.2–12.9)
RBC # BLD AUTO: 4.48 M/UL (ref 4–5.4)
RELATIVE EOSINOPHIL (OHS): 1.7 %
RELATIVE LYMPHOCYTE (OHS): 29.7 % (ref 18–48)
RELATIVE MONOCYTE (OHS): 5.7 % (ref 4–15)
RELATIVE NEUTROPHIL (OHS): 61.7 % (ref 38–73)
TIBC SERPL-MCNC: 373 UG/DL (ref 250–450)
TRANSFERRIN SERPL-MCNC: 252 MG/DL (ref 200–375)
WBC # BLD AUTO: 5.43 K/UL (ref 3.9–12.7)

## 2025-05-12 PROCEDURE — 85025 COMPLETE CBC W/AUTO DIFF WBC: CPT

## 2025-05-12 PROCEDURE — 83540 ASSAY OF IRON: CPT

## 2025-05-12 PROCEDURE — 82728 ASSAY OF FERRITIN: CPT

## 2025-05-12 PROCEDURE — 36415 COLL VENOUS BLD VENIPUNCTURE: CPT | Mod: PN

## 2025-05-13 ENCOUNTER — OFFICE VISIT (OUTPATIENT)
Dept: FAMILY MEDICINE | Facility: CLINIC | Age: 50
End: 2025-05-13
Payer: COMMERCIAL

## 2025-05-13 VITALS
TEMPERATURE: 98 F | SYSTOLIC BLOOD PRESSURE: 124 MMHG | RESPIRATION RATE: 18 BRPM | BODY MASS INDEX: 47.23 KG/M2 | HEART RATE: 109 BPM | OXYGEN SATURATION: 97 % | WEIGHT: 283.5 LBS | HEIGHT: 65 IN | DIASTOLIC BLOOD PRESSURE: 64 MMHG

## 2025-05-13 DIAGNOSIS — R42 DIZZINESS: ICD-10-CM

## 2025-05-13 DIAGNOSIS — D50.9 IRON DEFICIENCY ANEMIA, UNSPECIFIED IRON DEFICIENCY ANEMIA TYPE: ICD-10-CM

## 2025-05-13 DIAGNOSIS — F31.9 BIPOLAR AFFECTIVE DISORDER, REMISSION STATUS UNSPECIFIED: ICD-10-CM

## 2025-05-13 DIAGNOSIS — R73.03 PREDIABETES: ICD-10-CM

## 2025-05-13 DIAGNOSIS — J32.9 OTHER SINUSITIS, UNSPECIFIED CHRONICITY: Primary | ICD-10-CM

## 2025-05-13 LAB
CTP QC/QA: YES
POC MOLECULAR INFLUENZA A AGN: NEGATIVE
POC MOLECULAR INFLUENZA B AGN: NEGATIVE
SARS-COV-2 RNA RESP QL NAA+PROBE: NEGATIVE

## 2025-05-13 PROCEDURE — 99999 PR PBB SHADOW E&M-EST. PATIENT-LVL IV: CPT | Mod: PBBFAC,,, | Performed by: NURSE PRACTITIONER

## 2025-05-13 PROCEDURE — 87635 SARS-COV-2 COVID-19 AMP PRB: CPT | Performed by: NURSE PRACTITIONER

## 2025-05-13 NOTE — PROGRESS NOTES
Routine Office Visit    Patient Name: Zonia Skaggs    : 1975  MRN: [id number]    Chief Complaint:  Sinus problem, dizziness, headache, blurry vision    Subjective:  Zonia is a 49 y.o. female who presents today for:    Sinus problem, dizziness, headache, blurry vision - patient who is new to me with bipolar disorder which has been stable on Vraylar and Wellbutrin reports today for evaluation.  For the last 6 days she has not been feeling well.  Six days ago she started to feel intermittent dizziness like a lightheadedness like her equilibrium was off.  She has been feeling intermittent upper scalp head pains with associated blurred vision as well.  Within the last couple days she has started to develop some sinus issues which feel like a head cold without significant congestion although her sinuses do feel dry.  She does deal with chronic postnasal drip.  She dealt with vertigo last year for which she saw ENT but this dizziness feels different.  She denies any chest pain, palpitations, or coughing.  She does use an antihistamine as well as Flonase and Astelin sprays daily.  She is prediabetic (last A1c last year 6.4) and would like to get her A1c done.  She takes iron every other day.  Most recent H&H 10 and 36 with normal ferritin at 40.  She did feel like her right ear hurt at 1 point as well.  This is the extent of her concerns at this time.  Of note, she did have a CT of her orbits done last year which showed bilateral proptosis.  She states she saw an outside ophthalmologist (Ludin Javed) and was told she did not have any issues but she plans on following up with him.    Past Medical History  Past Medical History:   Diagnosis Date    Bipolar 1 disorder     Borderline diabetes 2019    Boxer's fracture, closed, initial encounter 2018    Gastroesophageal reflux disease without esophagitis 2019    GERD (gastroesophageal reflux disease)     History of psychiatric hospitalization     Iron  "deficiency anemia 05/21/2019    Seasonal allergic rhinitis 11/20/2019    Seasonal allergies     Sleep-related breathing disorder 08/23/2022    Therapy     used to follow with Dr. Gerber Rogers    Vaginal delivery     x5       Family History  Family History   Problem Relation Name Age of Onset    Bipolar disorder Maternal Aunt      Bipolar disorder Paternal Aunt      Diabetes type II Mother      Diabetes Mother      Hyperlipidemia Mother      Breast cancer Other Mat GGM     Diabetes Other Mat GGM     Colon cancer Neg Hx      Ovarian cancer Neg Hx      Anxiety disorder Neg Hx      Depression Neg Hx      Suicide Neg Hx      Stroke Neg Hx      Hypertension Neg Hx         Current Medications  Medications Ordered Prior to Encounter[1]    Allergies   Review of patient's allergies indicates:   Allergen Reactions    Penicillins Hives and Shortness Of Breath    Morphine        Review of Systems (Pertinent positives)  Review of Systems   Constitutional:  Positive for malaise/fatigue. Negative for chills and fever.   HENT:  Positive for ear pain. Negative for ear discharge, hearing loss, nosebleeds and tinnitus.    Eyes:  Positive for blurred vision.   Respiratory: Negative.     Cardiovascular: Negative.    Gastrointestinal: Negative.    Genitourinary: Negative.    Musculoskeletal: Negative.    Skin:  Negative for itching and rash.   Neurological:  Positive for dizziness and headaches. Negative for tingling, tremors and sensory change.   Psychiatric/Behavioral: Negative.         /64 (BP Location: Right arm, Patient Position: Sitting)   Pulse 109   Temp 98.2 °F (36.8 °C) (Oral)   Resp 18   Ht 5' 5" (1.651 m)   Wt 128.6 kg (283 lb 8.2 oz)   LMP 04/21/2025   SpO2 97%   BMI 47.18 kg/m²     Physical Exam  Vitals reviewed.   Constitutional:       General: She is not in acute distress.     Appearance: Normal appearance. She is not ill-appearing, toxic-appearing or diaphoretic.   HENT:      Head: Normocephalic and " atraumatic.   Cardiovascular:      Rate and Rhythm: Normal rate and regular rhythm.      Pulses: Normal pulses.      Heart sounds: Normal heart sounds.   Pulmonary:      Effort: Pulmonary effort is normal. No respiratory distress.      Breath sounds: Normal breath sounds. No wheezing.   Abdominal:      General: Bowel sounds are normal. There is no distension.      Palpations: Abdomen is soft.      Tenderness: There is no abdominal tenderness.   Musculoskeletal:         General: No swelling, tenderness or deformity. Normal range of motion.   Skin:     General: Skin is warm and dry.      Capillary Refill: Capillary refill takes less than 2 seconds.   Neurological:      General: No focal deficit present.      Mental Status: She is alert and oriented to person, place, and time.      Cranial Nerves: No cranial nerve deficit.      Motor: No weakness.   Psychiatric:         Mood and Affect: Mood normal.         Behavior: Behavior normal.            Assessment/Plan:  Zonia Skaggs is a 49 y.o. female who presents today for :    Zonia was seen today for fatigue, blurred vision and urinary frequency.    Diagnoses and all orders for this visit:    Other sinusitis, unspecified chronicity  -     POCT Influenza A/B Molecular  -     COVID-19 Routine Screening    Point of care influenza test negative.  Could be contributing to dizziness.  Will check COVID swab.  Recommended patient to continue to use nasal sprays.  No need for antibiotics today.    Iron deficiency anemia, unspecified iron deficiency anemia type    H&H mildly decreased from prior.  Monitor.  Potentially could be contributing to fatigue.  Continue iron supplements.    Prediabetes  -     Hemoglobin A1C; Future  -     Comprehensive Metabolic Panel; Future    Check labs to evaluate A1c and CMP.    Dizziness  -     CT Head Without Contrast; Future    Will get CT head to evaluate for any cause of dizziness.  May need a referral to neuro if symptoms do not improve.  Normal  chest and cardiac exam today.  Adequate hydration recommended.    Bipolar affective disorder, remission status unspecified    Doing well on current medications.  Continue.        This office note has been dictated.  This dictation has been generated using M-Modal Fluency Direct dictation; some phonetic errors may occur.          [1]   Current Outpatient Medications on File Prior to Visit   Medication Sig Dispense Refill    azelastine (ASTELIN) 137 mcg (0.1 %) nasal spray 1 spray (137 mcg total) by Nasal route 2 (two) times daily. 30 mL 3    buPROPion (WELLBUTRIN XL) 150 MG TB24 tablet Take 1 tablet (150 mg total) by mouth once daily. 90 tablet 2    cariprazine (VRAYLAR) 1.5 mg Cap Take 1 capsule (1.5 mg total) by mouth once daily. 90 capsule 2    ferrous sulfate (IRON ORAL) Take by mouth.      fluticasone propionate (FLONASE) 50 mcg/actuation nasal spray 1 spray (50 mcg total) by Each Nostril route once daily. 16 g 5    multivitamin with minerals tablet Take 1 tablet by mouth once daily.      omeprazole (PRILOSEC) 20 MG capsule Take 20 mg by mouth once daily.      ondansetron (ZOFRAN-ODT) 4 MG TbDL Take 1 tablet (4 mg total) by mouth every 12 (twelve) hours as needed (nausea). 30 tablet 0    ibuprofen (ADVIL,MOTRIN) 600 MG tablet Take 1 tablet (600 mg total) by mouth every 8 (eight) hours as needed for Pain. (Patient not taking: Reported on 5/13/2025) 30 tablet 0     No current facility-administered medications on file prior to visit.

## 2025-05-14 ENCOUNTER — RESULTS FOLLOW-UP (OUTPATIENT)
Dept: FAMILY MEDICINE | Facility: CLINIC | Age: 50
End: 2025-05-14

## 2025-05-19 ENCOUNTER — HOSPITAL ENCOUNTER (OUTPATIENT)
Dept: RADIOLOGY | Facility: HOSPITAL | Age: 50
Discharge: HOME | End: 2025-05-19
Attending: NURSE PRACTITIONER
Payer: COMMERCIAL

## 2025-05-19 DIAGNOSIS — R42 DIZZINESS: ICD-10-CM

## 2025-05-19 PROCEDURE — 70450 CT HEAD/BRAIN W/O DYE: CPT | Mod: 26,,, | Performed by: RADIOLOGY

## 2025-05-19 PROCEDURE — 70450 CT HEAD/BRAIN W/O DYE: CPT | Mod: TC

## 2025-06-13 ENCOUNTER — OFFICE VISIT (OUTPATIENT)
Dept: PSYCHIATRY | Facility: CLINIC | Age: 50
End: 2025-06-13
Payer: COMMERCIAL

## 2025-06-13 DIAGNOSIS — F31.77 BIPOLAR I DISORDER, MOST RECENT EPISODE MIXED, IN PARTIAL REMISSION: Primary | ICD-10-CM

## 2025-06-13 PROCEDURE — 90834 PSYTX W PT 45 MINUTES: CPT | Mod: 95,,, | Performed by: SOCIAL WORKER

## 2025-06-13 PROCEDURE — 3044F HG A1C LEVEL LT 7.0%: CPT | Mod: CPTII,95,, | Performed by: SOCIAL WORKER

## 2025-06-13 NOTE — PROGRESS NOTES
"The patient location is: home  The chief complaint leading to consultation is: depression, anxiety    Visit type: audiovisual    Face to Face time with patient: 45 minutes  55 minutes of total time spent on the encounter, which includes face to face time and non-face to face time preparing to see the patient (eg, review of tests), Obtaining and/or reviewing separately obtained history, Documenting clinical information in the electronic or other health record, Independently interpreting results (not separately reported) and communicating results to the patient/family/caregiver, or Care coordination (not separately reported).     Each patient to whom he or she provides medical services by telemedicine is:  (1) informed of the relationship between the physician and patient and the respective role of any other health care provider with respect to management of the patient; and (2) notified that he or she may decline to receive medical services by telemedicine and may withdraw from such care at any time.    Notes:    Individual Psychotherapy (PhD/LCSW)    6/13/2025    Site:  Telemed         Therapeutic Intervention: Met with patient.  Outpatient - Insight oriented psychotherapy 60 min - CPT code 67334 and Outpatient - Supportive psychotherapy 60 min - CPT Code 91661    Chief complaint/reason for encounter: depression and anxiety     Interval history and content of current session:  Patient returns for psychotherapy. Patient reports that she is "okay". Daughter and JUANCARLOS are coming in tomorrow night. States that she is supposed to be cleaning but has gotten into her book. States that she has been trying to help mother manage her stress. Doesn't take her medication so she gets stress easily. Feels like she has been having more depression/anxiety symptoms. Hasn't been wanting to go to the store because she is anxious. States that when daughter comes into town they are going to go to [employer] to look at houses with " daughter. Discussed different ways to manage mood when it pops up.     Schedule follow up 7/25    Treatment plan:  Target symptoms: depression, anxiety   Why chosen therapy is appropriate versus another modality: relevant to diagnosis, evidence based practice  Outcome monitoring methods: self-report, observation  Therapeutic intervention type: insight oriented psychotherapy, supportive psychotherapy    Risk parameters:  Patient reports no suicidal ideation  Patient reports no homicidal ideation  Patient reports no self-injurious behavior  Patient reports no violent behavior    Verbal deficits: None    Patient's response to intervention:  The patient's response to intervention is accepting.    Progress toward goals and other mental status changes:  The patient's progress toward goals is good.    Diagnosis:     ICD-10-CM ICD-9-CM   1. Bipolar I disorder, most recent episode mixed, in partial remission  F31.77 296.65       Plan:  individual psychotherapy and medication management by physician    Return to clinic: 1 month    Length of Service (minutes): 60    Cassandra Rockweiler, LCSW-BACS

## 2025-07-25 ENCOUNTER — OFFICE VISIT (OUTPATIENT)
Dept: PSYCHIATRY | Facility: CLINIC | Age: 50
End: 2025-07-25
Payer: COMMERCIAL

## 2025-07-25 DIAGNOSIS — F31.77 BIPOLAR I DISORDER, MOST RECENT EPISODE MIXED, IN PARTIAL REMISSION: Primary | ICD-10-CM

## 2025-07-25 PROCEDURE — 90785 PSYTX COMPLEX INTERACTIVE: CPT | Mod: 95,,, | Performed by: SOCIAL WORKER

## 2025-07-25 PROCEDURE — 3044F HG A1C LEVEL LT 7.0%: CPT | Mod: CPTII,95,, | Performed by: SOCIAL WORKER

## 2025-07-25 PROCEDURE — 90837 PSYTX W PT 60 MINUTES: CPT | Mod: 95,,, | Performed by: SOCIAL WORKER

## 2025-07-25 NOTE — PROGRESS NOTES
"The patient location is: home  The chief complaint leading to consultation is: depression, anxiety    Visit type: audiovisual    Face to Face time with patient: 55 minutes  65 minutes of total time spent on the encounter, which includes face to face time and non-face to face time preparing to see the patient (eg, review of tests), Obtaining and/or reviewing separately obtained history, Documenting clinical information in the electronic or other health record, Independently interpreting results (not separately reported) and communicating results to the patient/family/caregiver, or Care coordination (not separately reported).     Each patient to whom he or she provides medical services by telemedicine is:  (1) informed of the relationship between the physician and patient and the respective role of any other health care provider with respect to management of the patient; and (2) notified that he or she may decline to receive medical services by telemedicine and may withdraw from such care at any time.    Notes:    Individual Psychotherapy (PhD/LCSW)    7/25/2025    Site:  Telemed         Therapeutic Intervention: Met with patient.  Outpatient - Insight oriented psychotherapy 60 min - CPT code 43305 and Outpatient - Supportive psychotherapy 60 min - CPT Code 27406    Chief complaint/reason for encounter: depression and anxiety     Interval history and content of current session:  Patient returns for psychotherapy. Patient reports that she is "okay". Had to go to appointment for CPAP because it was saying that she wasn't in compliance. Reports that daughter isn't moving home as planned. Is going to stay in [place]. Reports that she has stopped working out but is still paying the . Trainer does send her messages but she doesn't respond. Reports that they got the call about interview for 's green card. Discussed that she has also worried about retiring. Since she hasn't worked in a long time she doesn't " have the credits to qualify for social security.     Schedule follow up 8/29    Treatment plan:  Target symptoms: depression, anxiety   Why chosen therapy is appropriate versus another modality: relevant to diagnosis, evidence based practice  Outcome monitoring methods: self-report, observation  Therapeutic intervention type: insight oriented psychotherapy, supportive psychotherapy    Risk parameters:  Patient reports no suicidal ideation  Patient reports no homicidal ideation  Patient reports no self-injurious behavior  Patient reports no violent behavior    Verbal deficits: None    Patient's response to intervention:  The patient's response to intervention is accepting.    Progress toward goals and other mental status changes:  The patient's progress toward goals is good.    Diagnosis:     ICD-10-CM ICD-9-CM   1. Bipolar I disorder, most recent episode mixed, in partial remission  F31.77 296.65     Plan:  individual psychotherapy and medication management by physician    Return to clinic: 1 month    Length of Service (minutes): 60    Cassandra Rockweiler, LCSW-BACS

## 2025-07-28 ENCOUNTER — TELEPHONE (OUTPATIENT)
Dept: PSYCHIATRY | Facility: CLINIC | Age: 50
End: 2025-07-28
Payer: COMMERCIAL

## 2025-07-30 ENCOUNTER — PATIENT MESSAGE (OUTPATIENT)
Dept: PSYCHIATRY | Facility: CLINIC | Age: 50
End: 2025-07-30
Payer: COMMERCIAL

## 2025-08-14 ENCOUNTER — TELEPHONE (OUTPATIENT)
Dept: PSYCHIATRY | Facility: CLINIC | Age: 50
End: 2025-08-14
Payer: COMMERCIAL

## 2025-08-27 ENCOUNTER — OFFICE VISIT (OUTPATIENT)
Dept: PSYCHIATRY | Facility: CLINIC | Age: 50
End: 2025-08-27
Payer: COMMERCIAL

## 2025-08-27 VITALS
HEART RATE: 109 BPM | BODY MASS INDEX: 47.35 KG/M2 | WEIGHT: 284.19 LBS | DIASTOLIC BLOOD PRESSURE: 79 MMHG | TEMPERATURE: 98 F | SYSTOLIC BLOOD PRESSURE: 153 MMHG | HEIGHT: 65 IN

## 2025-08-27 DIAGNOSIS — F43.23 ADJUSTMENT DISORDER WITH MIXED ANXIETY AND DEPRESSED MOOD: ICD-10-CM

## 2025-08-27 DIAGNOSIS — F31.77 BIPOLAR I DISORDER, MOST RECENT EPISODE MIXED, IN PARTIAL REMISSION: Primary | ICD-10-CM

## 2025-08-27 PROCEDURE — 1159F MED LIST DOCD IN RCRD: CPT | Mod: CPTII,S$GLB,,

## 2025-08-27 PROCEDURE — 99214 OFFICE O/P EST MOD 30 MIN: CPT | Mod: S$GLB,,,

## 2025-08-27 PROCEDURE — 99999 PR PBB SHADOW E&M-EST. PATIENT-LVL III: CPT | Mod: PBBFAC,,,

## 2025-08-27 PROCEDURE — 3078F DIAST BP <80 MM HG: CPT | Mod: CPTII,S$GLB,,

## 2025-08-27 PROCEDURE — 3044F HG A1C LEVEL LT 7.0%: CPT | Mod: CPTII,S$GLB,,

## 2025-08-27 PROCEDURE — 3008F BODY MASS INDEX DOCD: CPT | Mod: CPTII,S$GLB,,

## 2025-08-27 PROCEDURE — 1160F RVW MEDS BY RX/DR IN RCRD: CPT | Mod: CPTII,S$GLB,,

## 2025-08-27 PROCEDURE — G2211 COMPLEX E/M VISIT ADD ON: HCPCS | Mod: S$GLB,,,

## 2025-08-27 PROCEDURE — 3077F SYST BP >= 140 MM HG: CPT | Mod: CPTII,S$GLB,,

## 2025-08-27 RX ORDER — CELECOXIB 200 MG/1
200 CAPSULE ORAL 2 TIMES DAILY
COMMUNITY
Start: 2025-05-01

## 2025-08-27 RX ORDER — BUPROPION HYDROCHLORIDE 150 MG/1
150 TABLET ORAL DAILY
Qty: 90 TABLET | Refills: 2 | Status: SHIPPED | OUTPATIENT
Start: 2025-08-27

## 2025-08-28 ENCOUNTER — TELEPHONE (OUTPATIENT)
Dept: PSYCHIATRY | Facility: CLINIC | Age: 50
End: 2025-08-28
Payer: COMMERCIAL

## 2025-08-29 ENCOUNTER — OFFICE VISIT (OUTPATIENT)
Dept: PSYCHIATRY | Facility: CLINIC | Age: 50
End: 2025-08-29
Payer: COMMERCIAL

## 2025-08-29 DIAGNOSIS — F31.77 BIPOLAR I DISORDER, MOST RECENT EPISODE MIXED, IN PARTIAL REMISSION: Primary | ICD-10-CM

## 2025-09-05 ENCOUNTER — OFFICE VISIT (OUTPATIENT)
Dept: PULMONOLOGY | Facility: CLINIC | Age: 50
End: 2025-09-05
Payer: COMMERCIAL

## 2025-09-05 ENCOUNTER — TELEPHONE (OUTPATIENT)
Dept: BARIATRICS | Facility: CLINIC | Age: 50
End: 2025-09-05
Payer: COMMERCIAL

## 2025-09-05 VITALS
OXYGEN SATURATION: 96 % | HEIGHT: 65 IN | SYSTOLIC BLOOD PRESSURE: 129 MMHG | DIASTOLIC BLOOD PRESSURE: 84 MMHG | HEART RATE: 114 BPM | BODY MASS INDEX: 47.23 KG/M2 | WEIGHT: 283.5 LBS

## 2025-09-05 DIAGNOSIS — E66.01 MORBID (SEVERE) OBESITY DUE TO EXCESS CALORIES: ICD-10-CM

## 2025-09-05 DIAGNOSIS — R09.81 NASAL CONGESTION: ICD-10-CM

## 2025-09-05 DIAGNOSIS — G47.33 OSA (OBSTRUCTIVE SLEEP APNEA): Primary | ICD-10-CM

## 2025-09-05 PROCEDURE — 99999 PR PBB SHADOW E&M-EST. PATIENT-LVL IV: CPT | Mod: PBBFAC,,, | Performed by: INTERNAL MEDICINE

## (undated) DEVICE — BLANKET UPPER BODY 78.7X29.9IN

## (undated) DEVICE — ADHESIVE MASTISOL VIAL 48/BX

## (undated) DEVICE — SUT VICRYL PLUS ANTIBACT

## (undated) DEVICE — PACK ENDOSCOPY GENERAL

## (undated) DEVICE — CANISTER SUCTION 2 LTR

## (undated) DEVICE — DRESSING ABSRBNT ISLAND 3.6X8

## (undated) DEVICE — Device

## (undated) DEVICE — SEE MEDLINE ITEM 146417

## (undated) DEVICE — SUT VICRYL PLUS 3-0 SH 18IN

## (undated) DEVICE — SUT PDS II 0 CR/CT-1 8-18 V

## (undated) DEVICE — BINDER ABDOMINAL 9 46-62

## (undated) DEVICE — DRESSING TRANS 4X4 TEGADERM

## (undated) DEVICE — APPLICATOR CHLORAPREP ORN 26ML

## (undated) DEVICE — SUT VICRYL 4-0 ANTIBACT

## (undated) DEVICE — SEE MEDLINE ITEM 152740

## (undated) DEVICE — SOL 9P NACL IRR PIC IL

## (undated) DEVICE — ELECTRODE REM PLYHSV RETURN 9

## (undated) DEVICE — CLOSURE SKIN STERI STRIP 1/2X4

## (undated) DEVICE — GAUZE SPONGE 4X4 12PLY